# Patient Record
Sex: MALE | Race: WHITE | Employment: OTHER | URBAN - METROPOLITAN AREA
[De-identification: names, ages, dates, MRNs, and addresses within clinical notes are randomized per-mention and may not be internally consistent; named-entity substitution may affect disease eponyms.]

---

## 2017-01-18 ENCOUNTER — HOSPITAL ENCOUNTER (OUTPATIENT)
Dept: MRI IMAGING | Age: 67
Discharge: HOME | End: 2017-01-18
Attending: ORTHOPAEDIC SURGERY
Payer: COMMERCIAL

## 2017-01-18 DIAGNOSIS — M87.051 AVASCULAR NECROSIS OF BONE OF RIGHT HIP (HCC): ICD-10-CM

## 2017-01-18 PROCEDURE — 73721 MRI JNT OF LWR EXTRE W/O DYE: CPT

## 2017-01-24 ENCOUNTER — HOSPITAL ENCOUNTER (OUTPATIENT)
Dept: CT IMAGING | Age: 67
Discharge: HOME | End: 2017-01-24
Attending: ORTHOPAEDIC SURGERY
Payer: COMMERCIAL

## 2017-01-24 ENCOUNTER — HOSPITAL ENCOUNTER (OUTPATIENT)
Dept: INTERVENTIONAL RADIOLOGY/VASCULAR | Age: 67
Discharge: HOME | End: 2017-01-24
Attending: ORTHOPAEDIC SURGERY
Payer: COMMERCIAL

## 2017-01-24 VITALS
RESPIRATION RATE: 20 BRPM | TEMPERATURE: 98.1 F | WEIGHT: 180 LBS | DIASTOLIC BLOOD PRESSURE: 83 MMHG | BODY MASS INDEX: 28.93 KG/M2 | HEART RATE: 89 BPM | HEIGHT: 66 IN | OXYGEN SATURATION: 98 % | SYSTOLIC BLOOD PRESSURE: 148 MMHG

## 2017-01-24 DIAGNOSIS — M54.31 RIGHT SCIATIC NERVE PAIN: ICD-10-CM

## 2017-01-24 PROCEDURE — 74011636320 HC RX REV CODE- 636/320: Performed by: RADIOLOGY

## 2017-01-24 PROCEDURE — 72132 CT LUMBAR SPINE W/DYE: CPT

## 2017-01-24 PROCEDURE — 77030003666 HC NDL SPINAL BD -A

## 2017-01-24 PROCEDURE — 62304 MYELOGRAPHY LUMBAR INJECTION: CPT

## 2017-01-24 PROCEDURE — 77030014143 HC TY PUNC LUMBR BD -A

## 2017-01-24 PROCEDURE — 74011000250 HC RX REV CODE- 250: Performed by: RADIOLOGY

## 2017-01-24 RX ORDER — LIDOCAINE HYDROCHLORIDE 20 MG/ML
20-200 INJECTION, SOLUTION INFILTRATION; PERINEURAL ONCE
Status: COMPLETED | OUTPATIENT
Start: 2017-01-24 | End: 2017-01-24

## 2017-01-24 RX ADMIN — SODIUM BICARBONATE 2 ML: 0.2 INJECTION, SOLUTION INTRAVENOUS at 12:09

## 2017-01-24 RX ADMIN — LIDOCAINE HYDROCHLORIDE 60 MG: 20 INJECTION, SOLUTION INFILTRATION; PERINEURAL at 12:09

## 2017-01-24 RX ADMIN — IOPAMIDOL 20 ML: 408 INJECTION, SOLUTION INTRATHECAL at 12:09

## 2017-01-24 NOTE — IP AVS SNAPSHOT
Current Discharge Medication List  
  
ASK your doctor about these medications Dose & Instructions Dispensing Information Comments Morning Noon Evening Bedtime  
 aspirin delayed-release 81 mg tablet Your next dose is: Today, Tomorrow Other:  ____________ Dose:  81 mg Take 81 mg by mouth every morning. Refills:  0 CLARITIN 10 mg tablet Generic drug:  loratadine Your next dose is: Today, Tomorrow Other:  ____________ Dose:  10 mg Take 10 mg by mouth as needed for Allergies. Refills:  0  
     
   
   
   
  
 ibuprofen 400 mg tablet Commonly known as:  MOTRIN Your next dose is: Today, Tomorrow Other:  ____________ Refills:  0  
     
   
   
   
  
 lisinopril 10 mg tablet Commonly known as:  Ina Needy Your next dose is: Today, Tomorrow Other:  ____________ Dose:  10 mg Take 1 Tab by mouth daily. Quantity:  30 Tab Refills:  11  
     
   
   
   
  
 metoprolol succinate 25 mg XL tablet Commonly known as:  TOPROL-XL Your next dose is: Today, Tomorrow Other:  ____________ Dose:  25 mg Take 1 Tab by mouth daily. Quantity:  30 Tab Refills:  11  
     
   
   
   
  
 omeprazole 40 mg capsule Commonly known as:  PRILOSEC Your next dose is: Today, Tomorrow Other:  ____________ Dose:  40 mg Take 1 Cap by mouth every morning. Indications: GASTROESOPHAGEAL REFLUX Quantity:  30 Cap Refills:  11  
     
   
   
   
  
 rosuvastatin 20 mg tablet Commonly known as:  CRESTOR Your next dose is: Today, Tomorrow Other:  ____________ Dose:  20 mg Take 1 Tab by mouth every morning. Indications: HYPERCHOLESTEROLEMIA Quantity:  30 Tab Refills:  11  
     
   
   
   
  
 tamsulosin 0.4 mg capsule Commonly known as:  FLOMAX Your next dose is: Today, Tomorrow Other:  ____________ Dose:  0.4 mg Take 1 Cap by mouth nightly. Indications: BENIGN PROSTATIC HYPERPLASIA Quantity:  30 Cap Refills:  11  
     
   
   
   
  
 traMADol 50 mg tablet Commonly known as:  ULTRAM  
   
Your next dose is: Today, Tomorrow Other:  ____________ Dose:  50 mg Take 1 Tab by mouth every six (6) hours as needed for Pain. Max Daily Amount: 200 mg. Indications: PAIN Quantity:  45 Tab Refills:  0

## 2017-01-24 NOTE — PROGRESS NOTES
TRANSFER - IN REPORT:    Verbal report received from Gian RN(name) on Lance Harding  being received from IR(unit) for routine post - op      Report consisted of patients Situation, Background, Assessment and   Recommendations(SBAR). Information from the following report(s) Procedure Summary and MAR was reviewed with the receiving nurse. Opportunity for questions and clarification was provided. Assessment completed upon patients arrival to unit and care assumed.

## 2017-01-24 NOTE — ROUTINE PROCESS
TRANSFER - OUT REPORT:    Verbal report given to [facility] [address]  being transferred to IR recovery for routine post - op       Report consisted of patients Situation, Background, Assessment and   Recommendations(SBAR). Information from the following report(s) SBAR, Procedure Summary and MAR was reviewed with the receiving nurse. Opportunity for questions and clarification was provided.       Patient transported with:   Registered Nurse

## 2017-01-24 NOTE — PROGRESS NOTES
Interventional Radiology Prep Area Handoff      Allergies   Allergen Reactions    Codeine Rash     Tolerates Percocet without side- effects    Lipitor [Atorvastatin] Myalgia    Lortab [Hydrocodone-Acetaminophen] Rash and Itching       IRSummary reviewed. Pt identified with two identifiers. Verified procedure with Patient and IR Provider as Myelogram.    Consent obtained: yes H&P complete/updated: yes MD airway complete: n/a    Sedation:no   NPO: n/a   Anticoagulation: no     Pt shaved: yes   Antibiotics: n/a  Anesthesia notified: n/a    Additional Notes: n/a      Lines:  Peripherally Inserted Central Catheter:     Central Venous Catheter:     Venous Access Device:     Peripheral Intravenous Line:     Hemodialysis Catheter:     Drain(s):       Labs verified: yes  No results found for this or any previous visit (from the past 24 hour(s)). No data found.

## 2017-01-24 NOTE — PROCEDURES
Department of Interventional Radiology  [phone]        Interventional Radiology Brief Procedure Note    Patient: Chiara Manrique MRN: [id number]  SSN: [id number]    YOB: 1950  Age: 73 y.o. Sex: male      Date of Procedure: 1/24/2017    Pre-Procedure Diagnosis: back, RLE pain    Post-Procedure Diagnosis: SAME    Procedure(s): Myelogram    Brief Description of Procedure: fluoro guided LP, contrast injected, needle removed    Performed By: Michael Eduardo PA-C     Assistants: None    Anesthesia:None    Estimated Blood Loss: None    Specimens: None    Implants: None    Findings: See dictated report.      Complications: None    Recommendations: bedrest, CT     Follow Up: referring MD    Signed By: Michael Eduardo PA-C     January 24, 2017

## 2017-01-24 NOTE — H&P
Department of Interventional Radiology  [phone]    History and Physical    Patient:  Belen Lei MRN:  [id number]  SSN:  [id number]    YOB: 1950  Age:  73 y.o. Sex:  male      Primary Care Provider:  Bonnie Soto MD  Referring Physician:  Tsering Josue, *    Subjective:     Chief Complaint: back pain    History of the Present Illness: The patient is a 73 y.o. male who presents for lumbar myelogram. Low back pain radiates RLE        Past Medical History   Diagnosis Date    Abnormal cardiovascular function study 1/26/2016    Adenoma of left adrenal gland     BPH (benign prostatic hyperplasia)     Chest discomfort 1/26/2016    Chronic low back pain     COPD [facility]chronic obstructive pulmonary disease) ([facility] [address]     Coronary artery disease     ED (erectile dysfunction)     GERD (gastroesophageal reflux disease)     History of basal cell cancer     Hypertension     Mixed hyperlipidemia     Palpitations     Pleural effusion exudative 9/30/15     t-cent on left    Prostatic hypertrophy, benign 1/26/2016     Past Surgical History   Procedure Laterality Date    Hx colonoscopy  08/2011     tubular adenoma    Hx refractive surgery       bilateral     Hx heent Bilateral 8/2003     Lasik    Hx mohs procedure Left 2008    Pr total hip arthroplasty Left 3/2014    Hx lumbar fusion  2013     Laminectomy & fusion L4/L5    Pr cardiac surg procedure unlist  9/2015     CABG    Hx other surgical       thoracentesis        Review of Systems:    Pertinent items are noted in the History of Present Illness. Current Outpatient Prescriptions   Medication Sig    ibuprofen (MOTRIN) 400 mg tablet     metoprolol succinate (TOPROL-XL) 25 mg XL tablet Take 1 Tab by mouth daily.  omeprazole (PRILOSEC) 40 mg capsule Take 1 Cap by mouth every morning. Indications: GASTROESOPHAGEAL REFLUX    lisinopril (PRINIVIL, ZESTRIL) 10 mg tablet Take 1 Tab by mouth daily.     tamsulosin (FLOMAX) 0.4 mg capsule Take 1 Cap by mouth nightly. Indications: BENIGN PROSTATIC HYPERPLASIA    aspirin delayed-release 81 mg tablet Take 81 mg by mouth every morning.  traMADol (ULTRAM) 50 mg tablet Take 1 Tab by mouth every six (6) hours as needed for Pain. Max Daily Amount: 200 mg. Indications: PAIN    rosuvastatin (CRESTOR) 20 mg tablet Take 1 Tab by mouth every morning. Indications: HYPERCHOLESTEROLEMIA    loratadine (CLARITIN) 10 mg tablet Take 10 mg by mouth as needed for Allergies. No current facility-administered medications for this encounter. Allergies   Allergen Reactions    Codeine Rash     Tolerates Percocet without side- effects    Lipitor [Atorvastatin] Myalgia    Lortab [Hydrocodone-Acetaminophen] Rash and Itching     Patient states that he does not have allergy to this med and is currently taking as needed without issue. Family History   Problem Relation Age of Onset    Stroke Father     Hypertension Father     Heart Disease Father     Heart Disease Brother     Cancer Brother      lung    Heart Disease Mother     Heart Disease Brother     Heart Disease Sister     Lung Disease Brother     Cancer Sister      Pancrease     Social History   Substance Use Topics    Smoking status: Former Smoker     Packs/day: 2.00     Years: 30.00     Types: Cigarettes     Quit date: 2/1/1997    Smokeless tobacco: Never Used    Alcohol use 3.0 oz/week     6 Cans of beer per week        Objective:       Physical Examination:    Vitals:    01/24/17 1139   BP: 148/69   Pulse: 79   Temp: 98.1 °F (36.7 °C)   SpO2: 96%   Weight: 81.6 kg (180 lb)   Height: 5' 6\" (1.676 m)     Blood pressure 148/69, pulse 79, temperature 98.1 °F (36.7 °C), height 5' 6\" (1.676 m), weight 81.6 kg (180 lb), SpO2 96 %.   HEART: regular rate and rhythm  LUNG: clear to auscultation bilaterally  ABDOMEN: normal findings: soft, non-tender  EXTREMITIES: no deformities    Laboratory:     Lab Results   Component Value Date/Time Sodium 141 12/06/2016 07:47 AM    Sodium 140 06/28/2016 07:50 AM    Potassium 5.0 12/06/2016 07:47 AM    Potassium 4.6 06/28/2016 07:50 AM    Chloride 100 12/06/2016 07:47 AM    Chloride 103 06/28/2016 07:50 AM    CO2 23 12/06/2016 07:47 AM    CO2 20 06/28/2016 07:50 AM    Anion gap 8 09/18/2015 04:11 AM    Anion gap 8 09/17/2015 02:15 AM    Glucose 92 12/06/2016 07:47 AM    Glucose 101 06/28/2016 07:50 AM    BUN 12 12/06/2016 07:47 AM    BUN 17 06/28/2016 07:50 AM    Creatinine 0.96 12/06/2016 07:47 AM    Creatinine 0.95 06/28/2016 07:50 AM    GFR est AA 95 12/06/2016 07:47 AM    GFR est AA 97 06/28/2016 07:50 AM    GFR est non-AA 83 12/06/2016 07:47 AM    GFR est non-AA 84 06/28/2016 07:50 AM    Calcium 9.9 12/06/2016 07:47 AM    Calcium 9.7 06/28/2016 07:50 AM    Magnesium 2.7 09/18/2015 04:11 AM    Magnesium 2.6 09/17/2015 02:15 AM    Albumin 4.3 12/06/2016 07:47 AM    Albumin 4.3 06/28/2016 07:50 AM    Protein, total 7.2 12/06/2016 07:47 AM    Protein, total 7.0 06/28/2016 07:50 AM    Globulin 4.5 09/30/2015 07:22 AM    A-G Ratio 1.5 12/06/2016 07:47 AM    A-G Ratio 1.6 06/28/2016 07:50 AM    AST 26 12/06/2016 07:47 AM    AST 21 06/28/2016 07:50 AM    ALT 23 12/06/2016 07:47 AM    ALT 18 06/28/2016 07:50 AM     Lab Results   Component Value Date/Time    WBC 9.6 09/21/2015 09:40 AM    WBC 16.9 09/18/2015 04:11 AM    HGB 12.5 09/30/2015 08:40 AM    HGB 9.5 09/21/2015 09:40 AM    HCT 38.2 09/30/2015 08:40 AM    HCT 29.5 09/21/2015 09:40 AM    PLATELET 590 53/63/4655 09:40 AM    PLATELET 628 29/56/3895 04:11 AM     Lab Results   Component Value Date/Time    aPTT 29.6 09/16/2015 12:15 PM    aPTT 22.5 09/14/2015 08:00 AM    Prothrombin time 12.3 09/16/2015 12:15 PM    Prothrombin time 10.6 09/14/2015 08:00 AM    INR 1.1 09/16/2015 12:15 PM    INR 1.0 09/14/2015 08:00 AM       Assessment:     Low back pain    Plan:     Planned Procedure:  Lumbar myelogram    Risks, benefits, and alternatives reviewed with patient and he agrees to proceed with the procedure.       Signed By: Julia Mandujano PA-C     January 24, 2017

## 2017-01-24 NOTE — IP AVS SNAPSHOT
[facility] 
 
 
 [address] 
[phone] Patient: Oscar Castle MRN: [id number] CMV:[birth year] You are allergic to the following Allergen Reactions Codeine Rash Tolerates Percocet without side- effects Lipitor (Atorvastatin) Myalgia Lortab (Hydrocodone-Acetaminophen) Rash Itching Patient states that he does not have allergy to this med and is currently taking as needed without issue. Recent Documentation Height Weight BMI Smoking Status 1.676 m 81.6 kg 29.05 kg/m2 Former Smoker Emergency Contacts Name Discharge Info [facility]ation [facility] [3] Spouse [3] [phone] About your hospitalization You were admitted on:  January 24, 2017 You last received care in the:  [facility] Radiology Specials You were discharged on:  January 24, 2017 Unit phone number:  [phone] Why you were hospitalized Your primary diagnosis was:  Not on File Providers Seen During Your Hospitalizations Provider Role Specialty Primary office phone Castillo Allen MD Attending Provider Orthopedic Surgery [phone] Your Primary Care Physician (PCP) Primary Care Physician Office Phone Office Fax 70 [facility] [phone] Follow-up Information None Your Appointments Tuesday January 24, 2017  2:30 PM EST  
CT POST MYELO with [facility] CT [address] SLICE UNIT 1  
[facility] RADIOLOGY CT SCAN ([address] [place] [address]) [address]  
[phone] OUTSIDE FILMS - Any outside films related to the study being scheduled should be brought with you on the day of the exam if available.   MEDICATIONS - Patient must bring all medications currently taking to include prescriptions, over-the-counter meds, herbals, vitamins, and any dietary supplements. GENERAL INSTRUCTIONS - Must have someone to drive you home after the procedure, unless instructed otherwise by the Angio department. - For patients receiving Sedation: Patient must have nothing to eat or drink (NPO) after midnight. - If Lab work is required:  Encourage patient to have lab work completed PRIOR to arrival day of procedure.  Please report 30 minutes early to check in. Current Discharge Medication List  
  
ASK your doctor about these medications Dose & Instructions Dispensing Information Comments Morning Noon Evening Bedtime  
 aspirin delayed-release 81 mg tablet Your next dose is: Today, Tomorrow Other:  _________ Dose:  81 mg Take 81 mg by mouth every morning. Refills:  0 CLARITIN 10 mg tablet Generic drug:  loratadine Your next dose is: Today, Tomorrow Other:  _________ Dose:  10 mg Take 10 mg by mouth as needed for Allergies. Refills:  0  
     
   
   
   
  
 ibuprofen 400 mg tablet Commonly known as:  MOTRIN Your next dose is: Today, Tomorrow Other:  _________ Refills:  0  
     
   
   
   
  
 lisinopril 10 mg tablet Commonly known as:  Naseem Oscar Your next dose is: Today, Tomorrow Other:  _________ Dose:  10 mg Take 1 Tab by mouth daily. Quantity:  30 Tab Refills:  11  
     
   
   
   
  
 metoprolol succinate 25 mg XL tablet Commonly known as:  TOPROL-XL Your next dose is: Today, Tomorrow Other:  _________ Dose:  25 mg Take 1 Tab by mouth daily. Quantity:  30 Tab Refills:  11  
     
   
   
   
  
 omeprazole 40 mg capsule Commonly known as:  PRILOSEC Your next dose is: Today, Tomorrow Other:  _________ Dose:  40 mg Take 1 Cap by mouth every morning. Indications: GASTROESOPHAGEAL REFLUX Quantity:  30 Cap Refills:  11  
     
   
   
   
  
 rosuvastatin 20 mg tablet Commonly known as:  CRESTOR Your next dose is: Today, Tomorrow Other:  _________ Dose:  20 mg Take 1 Tab by mouth every morning. Indications: HYPERCHOLESTEROLEMIA Quantity:  30 Tab Refills:  11  
     
   
   
   
  
 tamsulosin 0.4 mg capsule Commonly known as:  FLOMAX Your next dose is: Today, Tomorrow Other:  _________ Dose:  0.4 mg Take 1 Cap by mouth nightly. Indications: BENIGN PROSTATIC HYPERPLASIA Quantity:  30 Cap Refills:  11  
     
   
   
   
  
 traMADol 50 mg tablet Commonly known as:  ULTRAM  
   
Your next dose is: Today, Tomorrow Other:  _________ Dose:  50 mg Take 1 Tab by mouth every six (6) hours as needed for Pain. Max Daily Amount: 200 mg. Indications: PAIN Quantity:  45 Tab Refills:  0 Discharge Instructions [address] Department of Interventional Radiology [facility] 
[phone] Office 
[phone] Fax POST LUMBAR PUNCTURE/MYELOGRAM/INTRATHECAL CHEMOTHERAPY DISCHARGE INSTRUCTIONS General Information: 
Lumbar Puncture: A LP is done to help diagnose several disorders, like pseudo tumor, migraines, meningitis, and multiple sclerosis. It involves a puncture (usually in the lower spine) into the sac that protects the spinal column. A sample of the fluid in that space is removed and tested in the lab. Myelogram: A Myelogram involves a lumbar puncture, and instead of removing fluid, contrast will be injected into the sac surrounding the spinal column. It is done to visualize the spinal column, nerve roots, spinal canal, vertebral discs and disc space. It is usually done to diagnose back pain with unknown cause or in preparation for surgery.  After the injection, a CT scan will be done, usually within two hours of the injection. Intrathecal Chemotherapy: 
 Chemotherapy can be given in many forms. Intrathecal chemo involves a lumbar puncture, and instead of removing fluid, the chemo will be injected into the space. After any of these procedures, you will be asked to lie flat on your back for 4-6 hours to prevent complications. You should also rest for 24 hours after you go home, and force fluids. If you have a headache, you should take Tylenol or acetaminophen. Call If: 
 You should call your Physician and/or the Radiology Nurse if you develop a headache that is not relieved by Tylenol, and worsens when you stand and eases when you lie down, you need to call. You may have developed what is referred to as a spinal headache. Our physicians will probably advise you to be on strict bed rest for 24 hours, to drink lots of fluids and caffeine. If this does not help the head pain, call again the next day. You should call if you have bleeding other than a small spot on your bandage. You should call if you have any numbness, tingling, weakness, fever, chills, urinary retention, severe itching, rash, welts, swelling, or confusion. Follow-up Instructions: See the doctor who ordered your procedure as he/she has instructed. If you had a Lumbar Puncture or Myelogram, your results should be available to your ordering doctor in 3-5 business days. You can remove your dressing in 24 hours and shower regularly. Do not bathe or swim for 72 hours. To Reach Us: If you have any questions about your procedure, please call the Interventional Radiology department at [phone]. After business hours (5pm) and weekends, call the answering service at [phone] and ask for the Radiologist on call to be paged. Interventional Radiology General Nurse Discharge After general anesthesia or intravenous sedation, for 24 hours or while taking prescription Narcotics: · Limit your activities · Do not drive and operate hazardous machinery · Do not make important personal or business decisions · Do  not drink alcoholic beverages · If you have not urinated within 8 hours after discharge, please contact your surgeon on call. * Please give a list of your current medications to your Primary Care Provider. * Please update this list whenever your medications are discontinued, doses are 
   changed, or new medications (including over-the-counter products) are added. * Please carry medication information at all times in case of emergency situations. These are general instructions for a healthy lifestyle: No smoking/ No tobacco products/ Avoid exposure to second hand smoke Surgeon General's Warning:  Quitting smoking now greatly reduces serious risk to your health. Obesity, smoking, and sedentary lifestyle greatly increases your risk for illness A healthy diet, regular physical exercise & weight monitoring are important for maintaining a healthy lifestyle You may be retaining fluid if you have a history of heart failure or if you experience any of the following symptoms:  Weight gain of 3 pounds or more overnight or 5 pounds in a week, increased swelling in our hands or feet or shortness of breath while lying flat in bed. Please call your doctor as soon as you notice any of these symptoms; do not wait until your next office visit. Recognize signs and symptoms of STROKE: 
F-face looks uneven A-arms unable to move or move unevenly S-speech slurred or non-existent T-time-call 911 as soon as signs and symptoms begin-DO NOT go Back to bed or wait to see if you get better-TIME IS BRAIN. Patient Signature: 
Date: 1/24/2017 Discharging Nurse: Hien Carvalho RN Discharge Orders None Introducing [facility]! Dear Vania Burch: Thank you for requesting a [employer] account.   Our records indicate that you already have an active [employer] account. You can access your account anytime at [email or web address] Did you know that you can access your hospital and ER discharge instructions at any time in [employer]? You can also review all of your test results from your hospital stay or ER visit. Additional Information If you have questions, please visit the Frequently Asked Questions section of the [employer] website at [email or web address] Remember, [employer] is NOT to be used for urgent needs. For medical emergencies, dial 911. Now available from your iPhone and Android! General Information Please provide this summary of care documentation to your next provider. Patient Signature:  ____________________________________________________________ Date:  ____________________________________________________________  
  
Eliza Boudreaux Provider Signature:  ____________________________________________________________ Date:  ____________________________________________________________

## 2017-01-24 NOTE — DISCHARGE INSTRUCTIONS
Anna Mariei 35 [address]  Department of Interventional Radiology  [facility]  [phone] Office  [phone] Fax  POST LUMBAR PUNCTURE/MYELOGRAM/INTRATHECAL CHEMOTHERAPY DISCHARGE INSTRUCTIONS  General Information:  Lumbar Puncture: A LP is done to help diagnose several disorders, like pseudo tumor, migraines, meningitis, and multiple sclerosis. It involves a puncture (usually in the lower spine) into the sac that protects the spinal column. A sample of the fluid in that space is removed and tested in the lab. Myelogram:   A Myelogram involves a lumbar puncture, and instead of removing fluid, contrast will be injected into the sac surrounding the spinal column. It is done to visualize the spinal column, nerve roots, spinal canal, vertebral discs and disc space. It is usually done to diagnose back pain with unknown cause or in preparation for surgery. After the injection, a CT scan will be done, usually within two hours of the injection. Intrathecal Chemotherapy:   Chemotherapy can be given in many forms. Intrathecal chemo involves a lumbar puncture, and instead of removing fluid, the chemo will be injected into the space. After any of these procedures, you will be asked to lie flat on your back for 4-6 hours to prevent complications. You should also rest for 24 hours after you go home, and force fluids. If you have a headache, you should take Tylenol or acetaminophen. Call If:   You should call your Physician and/or the Radiology Nurse if you develop a headache that is not relieved by Tylenol, and worsens when you stand and eases when you lie down, you need to call. You may have developed what is referred to as a spinal headache. Our physicians will probably advise you to be on strict bed rest for 24 hours, to drink lots of fluids and caffeine. If this does not help the head pain, call again the next day.  You should call if you have bleeding other than a small spot on your bandage. You should call if you have any numbness, tingling, weakness, fever, chills, urinary retention, severe itching, rash, welts, swelling, or confusion. Follow-up Instructions: See the doctor who ordered your procedure as he/she has instructed. If you had a Lumbar Puncture or Myelogram, your results should be available to your ordering doctor in 3-5 business days. You can remove your dressing in 24 hours and shower regularly. Do not bathe or swim for 72 hours. To Reach Us: If you have any questions about your procedure, please call the Interventional Radiology department at [phone]. After business hours (5pm) and weekends, call the answering service at [phone] and ask for the Radiologist on call to be paged. Interventional Radiology General Nurse Discharge    After general anesthesia or intravenous sedation, for 24 hours or while taking prescription Narcotics:  · Limit your activities  · Do not drive and operate hazardous machinery  · Do not make important personal or business decisions  · Do  not drink alcoholic beverages  · If you have not urinated within 8 hours after discharge, please contact your surgeon on call. * Please give a list of your current medications to your Primary Care Provider. * Please update this list whenever your medications are discontinued, doses are     changed, or new medications (including over-the-counter products) are added. * Please carry medication information at all times in case of emergency situations. These are general instructions for a healthy lifestyle:    No smoking/ No tobacco products/ Avoid exposure to second hand smoke  Surgeon General's Warning:  Quitting smoking now greatly reduces serious risk to your health.     Obesity, smoking, and sedentary lifestyle greatly increases your risk for illness  A healthy diet, regular physical exercise & weight monitoring are important for maintaining a healthy lifestyle    You may be retaining fluid if you have a history of heart failure or if you experience any of the following symptoms:  Weight gain of 3 pounds or more overnight or 5 pounds in a week, increased swelling in our hands or feet or shortness of breath while lying flat in bed. Please call your doctor as soon as you notice any of these symptoms; do not wait until your next office visit. Recognize signs and symptoms of STROKE:  F-face looks uneven    A-arms unable to move or move unevenly    S-speech slurred or non-existent    T-time-call 911 as soon as signs and symptoms begin-DO NOT go       Back to bed or wait to see if you get better-TIME IS BRAIN.       Patient Signature:  Date: 1/24/2017  Discharging Nurse: Philippe Crowley RN

## 2017-01-24 NOTE — PROGRESS NOTES
Recovery period without difficulty. Pt alert and oriented and denies pain. Dressing is clean, dry, and intact. Reviewed discharge instructions with patient and wife, both verbalized understanding. Pt escorted to lobby discharge area via wheelchair. Vital signs completed.

## 2017-06-05 ENCOUNTER — HOSPITAL ENCOUNTER (OUTPATIENT)
Dept: SURGERY | Age: 67
Discharge: HOME | End: 2017-06-05
Payer: COMMERCIAL

## 2017-06-05 VITALS
BODY MASS INDEX: 28.93 KG/M2 | RESPIRATION RATE: 18 BRPM | TEMPERATURE: 98.3 F | SYSTOLIC BLOOD PRESSURE: 159 MMHG | HEIGHT: 66 IN | DIASTOLIC BLOOD PRESSURE: 80 MMHG | OXYGEN SATURATION: 97 % | WEIGHT: 180 LBS | HEART RATE: 70 BPM

## 2017-06-05 LAB
ANION GAP BLD CALC-SCNC: 8 MMOL/L (ref 7–16)
APPEARANCE UR: CLEAR
BACTERIA SPEC CULT: NORMAL
BASOPHILS # BLD AUTO: 0 K/UL (ref 0–0.2)
BASOPHILS # BLD: 0 % (ref 0–2)
BILIRUB UR QL: NEGATIVE
BUN SERPL-MCNC: 13 MG/DL (ref 8–23)
CALCIUM SERPL-MCNC: 9.5 MG/DL (ref 8.3–10.4)
CHLORIDE SERPL-SCNC: 108 MMOL/L (ref 98–107)
CO2 SERPL-SCNC: 26 MMOL/L (ref 21–32)
COLOR UR: YELLOW
CREAT SERPL-MCNC: 1.1 MG/DL (ref 0.8–1.5)
DIFFERENTIAL METHOD BLD: ABNORMAL
EOSINOPHIL # BLD: 0.1 K/UL (ref 0–0.8)
EOSINOPHIL NFR BLD: 1 % (ref 0.5–7.8)
ERYTHROCYTE [DISTWIDTH] IN BLOOD BY AUTOMATED COUNT: 13.6 % (ref 11.9–14.6)
GLUCOSE SERPL-MCNC: 97 MG/DL (ref 65–100)
GLUCOSE UR STRIP.AUTO-MCNC: NEGATIVE MG/DL
HCT VFR BLD AUTO: 43.8 % (ref 41.1–50.3)
HGB BLD-MCNC: 14.5 G/DL (ref 13.6–17.2)
HGB UR QL STRIP: NEGATIVE
IMM GRANULOCYTES # BLD: 0 K/UL (ref 0–0.5)
IMM GRANULOCYTES NFR BLD AUTO: 0 % (ref 0–5)
KETONES UR QL STRIP.AUTO: NEGATIVE MG/DL
LEUKOCYTE ESTERASE UR QL STRIP.AUTO: NEGATIVE
LYMPHOCYTES # BLD AUTO: 21 % (ref 13–44)
LYMPHOCYTES # BLD: 1.7 K/UL (ref 0.5–4.6)
MCH RBC QN AUTO: 31.4 PG (ref 26.1–32.9)
MCHC RBC AUTO-ENTMCNC: 33.1 G/DL (ref 31.4–35)
MCV RBC AUTO: 94.8 FL (ref 79.6–97.8)
MONOCYTES # BLD: 0.9 K/UL (ref 0.1–1.3)
MONOCYTES NFR BLD AUTO: 11 % (ref 4–12)
NEUTS SEG # BLD: 5.6 K/UL (ref 1.7–8.2)
NEUTS SEG NFR BLD AUTO: 67 % (ref 43–78)
NITRITE UR QL STRIP.AUTO: NEGATIVE
PH UR STRIP: 5.5 [PH] (ref 5–9)
PLATELET # BLD AUTO: 276 K/UL (ref 150–450)
PLATELET COMMENTS,PCOM: ADEQUATE
PMV BLD AUTO: 10 FL (ref 10.8–14.1)
POTASSIUM SERPL-SCNC: 4.3 MMOL/L (ref 3.5–5.1)
PROT UR STRIP-MCNC: NEGATIVE MG/DL
RBC # BLD AUTO: 4.62 M/UL (ref 4.23–5.67)
RBC MORPH BLD: ABNORMAL
SERVICE CMNT-IMP: NORMAL
SODIUM SERPL-SCNC: 142 MMOL/L (ref 136–145)
SP GR UR REFRACTOMETRY: 1.02 (ref 1–1.02)
UROBILINOGEN UR QL STRIP.AUTO: 0.2 EU/DL (ref 0.2–1)
WBC # BLD AUTO: 8.3 K/UL (ref 4.3–11.1)
WBC MORPH BLD: ABNORMAL

## 2017-06-05 PROCEDURE — 85025 COMPLETE CBC W/AUTO DIFF WBC: CPT | Performed by: ORTHOPAEDIC SURGERY

## 2017-06-05 PROCEDURE — 80048 BASIC METABOLIC PNL TOTAL CA: CPT | Performed by: ORTHOPAEDIC SURGERY

## 2017-06-05 PROCEDURE — 81003 URINALYSIS AUTO W/O SCOPE: CPT | Performed by: ORTHOPAEDIC SURGERY

## 2017-06-05 PROCEDURE — 77030027138 HC INCENT SPIROMETER -A

## 2017-06-05 PROCEDURE — 87641 MR-STAPH DNA AMP PROBE: CPT | Performed by: ORTHOPAEDIC SURGERY

## 2017-06-05 NOTE — PERIOP NOTES
Patient verified name, , and surgery as listed in [facility]. TYPE  CASE:III  Orders per surgeonreceived  Labs per surgeon: T&S, CBC, BMP; UA  Labs per anesthesia protocol:  T&S for day of surgery  EKG  : In chart- NSR- 16  MRSA/MSSA:done  Pt instructed that they will be notified of positive results and will use mupirocin ointment as directed. Patient provided with handouts including guide to surgery , transfusions, pain management and hand hygiene for the family and community. Pt verbalizes understanding of all pre-op instructions . Instructed that family must be present in building at all times. Hibiclens and instructions given per hospital policy. Instructed patient to continue  previous medications as prescribed prior to surgery and  to take the following medications the day of surgery according to anesthesia guidelines : ASA 81mg, prilosec, crestor, metoprolol       Continue all previous medications unless otherwise directed. Original medication prescription bottles not visualized during patient appointment. Instructed patient to hold  the following medications prior to surgery: ibuprofen      Patient verbalized understanding of all instructions and provided all medical/health information to the best of their ability. Road to Recovery Spine surgery patient guide given. Instructed on incentive spirometry with return demonstration. Long handled prehab sponge given with instructions for use. Patient viewed spine prehab video.

## 2017-06-12 ENCOUNTER — ANESTHESIA EVENT (OUTPATIENT)
Dept: SURGERY | Age: 67
DRG: 460 | End: 2017-06-12
Payer: MEDICARE

## 2017-06-13 ENCOUNTER — HOSPITAL ENCOUNTER (INPATIENT)
Age: 67
LOS: 2 days | Discharge: HOME | DRG: 460 | End: 2017-06-15
Attending: ORTHOPAEDIC SURGERY | Admitting: ORTHOPAEDIC SURGERY
Payer: MEDICARE

## 2017-06-13 ENCOUNTER — ANESTHESIA (OUTPATIENT)
Dept: SURGERY | Age: 67
DRG: 460 | End: 2017-06-13
Payer: MEDICARE

## 2017-06-13 ENCOUNTER — APPOINTMENT (OUTPATIENT)
Dept: GENERAL RADIOLOGY | Age: 67
DRG: 460 | End: 2017-06-13
Attending: ORTHOPAEDIC SURGERY
Payer: MEDICARE

## 2017-06-13 LAB
ABO + RH BLD: NORMAL
BLOOD GROUP ANTIBODIES SERPL: NORMAL
SPECIMEN EXP DATE BLD: NORMAL

## 2017-06-13 PROCEDURE — 86900 BLOOD TYPING SEROLOGIC ABO: CPT | Performed by: ANESTHESIOLOGY

## 2017-06-13 PROCEDURE — 0SG30AJ FUSION OF LUMBOSACRAL JOINT WITH INTERBODY FUSION DEVICE, POSTERIOR APPROACH, ANTERIOR COLUMN, OPEN APPROACH: ICD-10-PCS | Performed by: ORTHOPAEDIC SURGERY

## 2017-06-13 PROCEDURE — C1713 ANCHOR/SCREW BN/BN,TIS/BN: HCPCS | Performed by: ORTHOPAEDIC SURGERY

## 2017-06-13 PROCEDURE — 74011250637 HC RX REV CODE- 250/637: Performed by: ORTHOPAEDIC SURGERY

## 2017-06-13 PROCEDURE — 77030034850: Performed by: ORTHOPAEDIC SURGERY

## 2017-06-13 PROCEDURE — 0ST40ZZ RESECTION OF LUMBOSACRAL DISC, OPEN APPROACH: ICD-10-PCS | Performed by: ORTHOPAEDIC SURGERY

## 2017-06-13 PROCEDURE — 77030019940 HC BLNKT HYPOTHRM STRY -B: Performed by: ANESTHESIOLOGY

## 2017-06-13 PROCEDURE — 74011250636 HC RX REV CODE- 250/636: Performed by: ORTHOPAEDIC SURGERY

## 2017-06-13 PROCEDURE — 74011250636 HC RX REV CODE- 250/636: Performed by: ANESTHESIOLOGY

## 2017-06-13 PROCEDURE — 72100 X-RAY EXAM L-S SPINE 2/3 VWS: CPT

## 2017-06-13 PROCEDURE — 77030037710: Performed by: ORTHOPAEDIC SURGERY

## 2017-06-13 PROCEDURE — 0SG00AJ FUSION OF LUMBAR VERTEBRAL JOINT WITH INTERBODY FUSION DEVICE, POSTERIOR APPROACH, ANTERIOR COLUMN, OPEN APPROACH: ICD-10-PCS | Performed by: ORTHOPAEDIC SURGERY

## 2017-06-13 PROCEDURE — 74011250636 HC RX REV CODE- 250/636

## 2017-06-13 PROCEDURE — 77030030163 HC BN WAX J&J -A: Performed by: ORTHOPAEDIC SURGERY

## 2017-06-13 PROCEDURE — 77030032490 HC SLV COMPR SCD KNE COVD -B: Performed by: ORTHOPAEDIC SURGERY

## 2017-06-13 PROCEDURE — 76210000017 HC OR PH I REC 1.5 TO 2 HR: Performed by: ORTHOPAEDIC SURGERY

## 2017-06-13 PROCEDURE — 77030008477 HC STYL SATN SLP COVD -A: Performed by: ANESTHESIOLOGY

## 2017-06-13 PROCEDURE — 74000 XR ABD (KUB): CPT

## 2017-06-13 PROCEDURE — 079T3ZZ DRAINAGE OF BONE MARROW, PERCUTANEOUS APPROACH: ICD-10-PCS | Performed by: ORTHOPAEDIC SURGERY

## 2017-06-13 PROCEDURE — 77030027138 HC INCENT SPIROMETER -A

## 2017-06-13 PROCEDURE — 77030000001 HC SPCR SPN PEEK STRY -I2: Performed by: ORTHOPAEDIC SURGERY

## 2017-06-13 PROCEDURE — 77030020782 HC GWN BAIR PAWS FLX 3M -B: Performed by: ANESTHESIOLOGY

## 2017-06-13 PROCEDURE — 77030018836 HC SOL IRR NACL ICUM -A: Performed by: ORTHOPAEDIC SURGERY

## 2017-06-13 PROCEDURE — 77030014007 HC SPNG HEMSTAT J&J -B: Performed by: ORTHOPAEDIC SURGERY

## 2017-06-13 PROCEDURE — 0SG00Z1 FUSION OF LUM JT, POST APPR P COL, OPEN APPROACH: ICD-10-PCS | Performed by: ORTHOPAEDIC SURGERY

## 2017-06-13 PROCEDURE — 01NB0ZZ RELEASE LUMBAR NERVE, OPEN APPROACH: ICD-10-PCS | Performed by: ORTHOPAEDIC SURGERY

## 2017-06-13 PROCEDURE — 77030012894: Performed by: ORTHOPAEDIC SURGERY

## 2017-06-13 PROCEDURE — 74011000250 HC RX REV CODE- 250

## 2017-06-13 PROCEDURE — 77030020407 HC IV BLD WRMR ST 3M -A: Performed by: ANESTHESIOLOGY

## 2017-06-13 PROCEDURE — 0ST20ZZ RESECTION OF LUMBAR VERTEBRAL DISC, OPEN APPROACH: ICD-10-PCS | Performed by: ORTHOPAEDIC SURGERY

## 2017-06-13 PROCEDURE — 76010000173 HC OR TIME 3 TO 3.5 HR INTENSV-TIER 1: Performed by: ORTHOPAEDIC SURGERY

## 2017-06-13 PROCEDURE — 00NY0ZZ RELEASE LUMBAR SPINAL CORD, OPEN APPROACH: ICD-10-PCS | Performed by: ORTHOPAEDIC SURGERY

## 2017-06-13 PROCEDURE — 0SG30Z1 FUSION OF LUMBOSACRAL JOINT, POST APPR P COL, OPEN APPROACH: ICD-10-PCS | Performed by: ORTHOPAEDIC SURGERY

## 2017-06-13 PROCEDURE — 74011000250 HC RX REV CODE- 250: Performed by: ANESTHESIOLOGY

## 2017-06-13 PROCEDURE — 77030014647 HC SEAL FBRN TISSL BAXT -D: Performed by: ORTHOPAEDIC SURGERY

## 2017-06-13 PROCEDURE — 77030019908 HC STETH ESOPH SIMS -A: Performed by: ANESTHESIOLOGY

## 2017-06-13 PROCEDURE — 77030031139 HC SUT VCRL2 J&J -A: Performed by: ORTHOPAEDIC SURGERY

## 2017-06-13 PROCEDURE — 77030008703 HC TU ET UNCUF COVD -A: Performed by: ANESTHESIOLOGY

## 2017-06-13 PROCEDURE — 65270000029 HC RM PRIVATE

## 2017-06-13 PROCEDURE — 77030011640 HC PAD GRND REM COVD -A: Performed by: ORTHOPAEDIC SURGERY

## 2017-06-13 PROCEDURE — 77030025623 HC BUR RND PRECIS STRY -D: Performed by: ORTHOPAEDIC SURGERY

## 2017-06-13 PROCEDURE — 76060000037 HC ANESTHESIA 3 TO 3.5 HR: Performed by: ORTHOPAEDIC SURGERY

## 2017-06-13 DEVICE — BLOCKER
Type: IMPLANTABLE DEVICE | Site: SPINE LUMBAR | Status: FUNCTIONAL
Brand: XIA 3

## 2017-06-13 DEVICE — GRAFT BNE SUB 20CC 2 4MM GRAN GROWTH FACT ALLGRFT OSTEOAMP: Type: IMPLANTABLE DEVICE | Site: SPINE LUMBAR | Status: FUNCTIONAL

## 2017-06-13 DEVICE — TI ALLOY RAD ROD
Type: IMPLANTABLE DEVICE | Site: SPINE LUMBAR | Status: FUNCTIONAL
Brand: XIA 3

## 2017-06-13 DEVICE — POLYAXIAL SCREW
Type: IMPLANTABLE DEVICE | Site: SPINE LUMBAR | Status: FUNCTIONAL
Brand: XIA 3

## 2017-06-13 DEVICE — VERTEBRAL SPACER
Type: IMPLANTABLE DEVICE | Site: SPINE LUMBAR | Status: FUNCTIONAL
Brand: AVS TL

## 2017-06-13 DEVICE — BIO DBM PLUS PUTTY (WITH CANCELLOUS)
Type: IMPLANTABLE DEVICE | Site: SPINE LUMBAR | Status: FUNCTIONAL
Brand: BIO DBM

## 2017-06-13 RX ORDER — SODIUM CHLORIDE 0.9 % (FLUSH) 0.9 %
5-10 SYRINGE (ML) INJECTION EVERY 8 HOURS
Status: DISCONTINUED | OUTPATIENT
Start: 2017-06-13 | End: 2017-06-13 | Stop reason: HOSPADM

## 2017-06-13 RX ORDER — ROCURONIUM BROMIDE 10 MG/ML
INJECTION, SOLUTION INTRAVENOUS AS NEEDED
Status: DISCONTINUED | OUTPATIENT
Start: 2017-06-13 | End: 2017-06-13 | Stop reason: HOSPADM

## 2017-06-13 RX ORDER — ALBUTEROL SULFATE 0.83 MG/ML
2.5 SOLUTION RESPIRATORY (INHALATION) AS NEEDED
Status: DISCONTINUED | OUTPATIENT
Start: 2017-06-13 | End: 2017-06-13 | Stop reason: HOSPADM

## 2017-06-13 RX ORDER — OXYCODONE HYDROCHLORIDE 5 MG/1
5 TABLET ORAL
Status: DISCONTINUED | OUTPATIENT
Start: 2017-06-13 | End: 2017-06-13 | Stop reason: HOSPADM

## 2017-06-13 RX ORDER — LIDOCAINE HYDROCHLORIDE 10 MG/ML
0.1 INJECTION INFILTRATION; PERINEURAL AS NEEDED
Status: DISCONTINUED | OUTPATIENT
Start: 2017-06-13 | End: 2017-06-13 | Stop reason: HOSPADM

## 2017-06-13 RX ORDER — ONDANSETRON 2 MG/ML
4 INJECTION INTRAMUSCULAR; INTRAVENOUS
Status: DISCONTINUED | OUTPATIENT
Start: 2017-06-13 | End: 2017-06-15 | Stop reason: HOSPADM

## 2017-06-13 RX ORDER — ZOLPIDEM TARTRATE 5 MG/1
5 TABLET ORAL
Status: DISCONTINUED | OUTPATIENT
Start: 2017-06-13 | End: 2017-06-15 | Stop reason: HOSPADM

## 2017-06-13 RX ORDER — ROSUVASTATIN CALCIUM 20 MG/1
20 TABLET, COATED ORAL
Status: DISCONTINUED | OUTPATIENT
Start: 2017-06-14 | End: 2017-06-15 | Stop reason: HOSPADM

## 2017-06-13 RX ORDER — NEOSTIGMINE METHYLSULFATE 1 MG/ML
INJECTION INTRAVENOUS AS NEEDED
Status: DISCONTINUED | OUTPATIENT
Start: 2017-06-13 | End: 2017-06-13 | Stop reason: HOSPADM

## 2017-06-13 RX ORDER — ACETAMINOPHEN 10 MG/ML
1000 INJECTION, SOLUTION INTRAVENOUS ONCE
Status: COMPLETED | OUTPATIENT
Start: 2017-06-13 | End: 2017-06-13

## 2017-06-13 RX ORDER — VANCOMYCIN HYDROCHLORIDE 1 G/20ML
INJECTION, POWDER, LYOPHILIZED, FOR SOLUTION INTRAVENOUS AS NEEDED
Status: DISCONTINUED | OUTPATIENT
Start: 2017-06-13 | End: 2017-06-13 | Stop reason: HOSPADM

## 2017-06-13 RX ORDER — HYDROMORPHONE HYDROCHLORIDE 2 MG/ML
INJECTION, SOLUTION INTRAMUSCULAR; INTRAVENOUS; SUBCUTANEOUS AS NEEDED
Status: DISCONTINUED | OUTPATIENT
Start: 2017-06-13 | End: 2017-06-13 | Stop reason: HOSPADM

## 2017-06-13 RX ORDER — NALOXONE HYDROCHLORIDE 0.4 MG/ML
0.4 INJECTION, SOLUTION INTRAMUSCULAR; INTRAVENOUS; SUBCUTANEOUS
Status: DISCONTINUED | OUTPATIENT
Start: 2017-06-13 | End: 2017-06-15 | Stop reason: HOSPADM

## 2017-06-13 RX ORDER — TAMSULOSIN HYDROCHLORIDE 0.4 MG/1
0.4 CAPSULE ORAL
Status: DISCONTINUED | OUTPATIENT
Start: 2017-06-13 | End: 2017-06-15 | Stop reason: HOSPADM

## 2017-06-13 RX ORDER — OXYCODONE HYDROCHLORIDE 5 MG/1
5 TABLET ORAL
Status: DISCONTINUED | OUTPATIENT
Start: 2017-06-13 | End: 2017-06-15 | Stop reason: HOSPADM

## 2017-06-13 RX ORDER — FENTANYL CITRATE 50 UG/ML
INJECTION, SOLUTION INTRAMUSCULAR; INTRAVENOUS AS NEEDED
Status: DISCONTINUED | OUTPATIENT
Start: 2017-06-13 | End: 2017-06-13 | Stop reason: HOSPADM

## 2017-06-13 RX ORDER — FAMOTIDINE 20 MG/1
20 TABLET, FILM COATED ORAL EVERY 12 HOURS
Status: DISCONTINUED | OUTPATIENT
Start: 2017-06-13 | End: 2017-06-15 | Stop reason: HOSPADM

## 2017-06-13 RX ORDER — HYDROMORPHONE HYDROCHLORIDE 2 MG/ML
0.5 INJECTION, SOLUTION INTRAMUSCULAR; INTRAVENOUS; SUBCUTANEOUS
Status: DISCONTINUED | OUTPATIENT
Start: 2017-06-13 | End: 2017-06-13 | Stop reason: HOSPADM

## 2017-06-13 RX ORDER — METOPROLOL SUCCINATE 25 MG/1
25 TABLET, EXTENDED RELEASE ORAL
Status: DISCONTINUED | OUTPATIENT
Start: 2017-06-14 | End: 2017-06-15 | Stop reason: HOSPADM

## 2017-06-13 RX ORDER — GLYCOPYRROLATE 0.2 MG/ML
INJECTION INTRAMUSCULAR; INTRAVENOUS AS NEEDED
Status: DISCONTINUED | OUTPATIENT
Start: 2017-06-13 | End: 2017-06-13 | Stop reason: HOSPADM

## 2017-06-13 RX ORDER — LIDOCAINE HYDROCHLORIDE 20 MG/ML
INJECTION, SOLUTION EPIDURAL; INFILTRATION; INTRACAUDAL; PERINEURAL AS NEEDED
Status: DISCONTINUED | OUTPATIENT
Start: 2017-06-13 | End: 2017-06-13 | Stop reason: HOSPADM

## 2017-06-13 RX ORDER — PROPOFOL 10 MG/ML
INJECTION, EMULSION INTRAVENOUS AS NEEDED
Status: DISCONTINUED | OUTPATIENT
Start: 2017-06-13 | End: 2017-06-13 | Stop reason: HOSPADM

## 2017-06-13 RX ORDER — CEFAZOLIN SODIUM IN 0.9 % NACL 2 G/50 ML
2 INTRAVENOUS SOLUTION, PIGGYBACK (ML) INTRAVENOUS EVERY 8 HOURS
Status: COMPLETED | OUTPATIENT
Start: 2017-06-13 | End: 2017-06-14

## 2017-06-13 RX ORDER — SODIUM CHLORIDE 0.9 % (FLUSH) 0.9 %
5-10 SYRINGE (ML) INJECTION AS NEEDED
Status: DISCONTINUED | OUTPATIENT
Start: 2017-06-13 | End: 2017-06-13 | Stop reason: HOSPADM

## 2017-06-13 RX ORDER — SODIUM CHLORIDE, SODIUM LACTATE, POTASSIUM CHLORIDE, CALCIUM CHLORIDE 600; 310; 30; 20 MG/100ML; MG/100ML; MG/100ML; MG/100ML
75 INJECTION, SOLUTION INTRAVENOUS CONTINUOUS
Status: DISCONTINUED | OUTPATIENT
Start: 2017-06-13 | End: 2017-06-13 | Stop reason: HOSPADM

## 2017-06-13 RX ORDER — DIPHENHYDRAMINE HCL 25 MG
25 CAPSULE ORAL
Status: DISCONTINUED | OUTPATIENT
Start: 2017-06-13 | End: 2017-06-15 | Stop reason: HOSPADM

## 2017-06-13 RX ORDER — SODIUM CHLORIDE, SODIUM LACTATE, POTASSIUM CHLORIDE, CALCIUM CHLORIDE 600; 310; 30; 20 MG/100ML; MG/100ML; MG/100ML; MG/100ML
50 INJECTION, SOLUTION INTRAVENOUS CONTINUOUS
Status: DISCONTINUED | OUTPATIENT
Start: 2017-06-13 | End: 2017-06-13 | Stop reason: HOSPADM

## 2017-06-13 RX ORDER — CEFAZOLIN SODIUM IN 0.9 % NACL 2 G/50 ML
2 INTRAVENOUS SOLUTION, PIGGYBACK (ML) INTRAVENOUS ONCE
Status: COMPLETED | OUTPATIENT
Start: 2017-06-13 | End: 2017-06-13

## 2017-06-13 RX ORDER — DOCUSATE SODIUM 100 MG/1
100 CAPSULE, LIQUID FILLED ORAL 2 TIMES DAILY
Status: DISCONTINUED | OUTPATIENT
Start: 2017-06-13 | End: 2017-06-15 | Stop reason: HOSPADM

## 2017-06-13 RX ORDER — FENTANYL CITRATE 50 UG/ML
100 INJECTION, SOLUTION INTRAMUSCULAR; INTRAVENOUS ONCE
Status: DISCONTINUED | OUTPATIENT
Start: 2017-06-13 | End: 2017-06-13 | Stop reason: HOSPADM

## 2017-06-13 RX ORDER — MIDAZOLAM HYDROCHLORIDE 1 MG/ML
2 INJECTION, SOLUTION INTRAMUSCULAR; INTRAVENOUS
Status: DISCONTINUED | OUTPATIENT
Start: 2017-06-13 | End: 2017-06-13 | Stop reason: HOSPADM

## 2017-06-13 RX ORDER — SODIUM CHLORIDE 0.9 % (FLUSH) 0.9 %
5-10 SYRINGE (ML) INJECTION AS NEEDED
Status: DISCONTINUED | OUTPATIENT
Start: 2017-06-13 | End: 2017-06-15 | Stop reason: HOSPADM

## 2017-06-13 RX ORDER — SODIUM CHLORIDE, SODIUM LACTATE, POTASSIUM CHLORIDE, CALCIUM CHLORIDE 600; 310; 30; 20 MG/100ML; MG/100ML; MG/100ML; MG/100ML
75 INJECTION, SOLUTION INTRAVENOUS CONTINUOUS
Status: DISPENSED | OUTPATIENT
Start: 2017-06-13 | End: 2017-06-14

## 2017-06-13 RX ORDER — MORPHINE SULFATE 2 MG/ML
2 INJECTION, SOLUTION INTRAMUSCULAR; INTRAVENOUS
Status: DISCONTINUED | OUTPATIENT
Start: 2017-06-13 | End: 2017-06-15 | Stop reason: HOSPADM

## 2017-06-13 RX ORDER — CYCLOBENZAPRINE HCL 10 MG
10 TABLET ORAL
Status: DISCONTINUED | OUTPATIENT
Start: 2017-06-13 | End: 2017-06-15 | Stop reason: HOSPADM

## 2017-06-13 RX ORDER — SODIUM CHLORIDE 0.9 % (FLUSH) 0.9 %
5-10 SYRINGE (ML) INJECTION EVERY 8 HOURS
Status: DISCONTINUED | OUTPATIENT
Start: 2017-06-13 | End: 2017-06-15 | Stop reason: HOSPADM

## 2017-06-13 RX ORDER — ACETAMINOPHEN 325 MG/1
650 TABLET ORAL EVERY 6 HOURS
Status: DISCONTINUED | OUTPATIENT
Start: 2017-06-13 | End: 2017-06-15 | Stop reason: HOSPADM

## 2017-06-13 RX ORDER — CELECOXIB 200 MG/1
200 CAPSULE ORAL
Status: DISCONTINUED | OUTPATIENT
Start: 2017-06-13 | End: 2017-06-13

## 2017-06-13 RX ORDER — OXYCODONE HYDROCHLORIDE 5 MG/1
10 TABLET ORAL
Status: DISCONTINUED | OUTPATIENT
Start: 2017-06-13 | End: 2017-06-15 | Stop reason: HOSPADM

## 2017-06-13 RX ORDER — ONDANSETRON 2 MG/ML
INJECTION INTRAMUSCULAR; INTRAVENOUS AS NEEDED
Status: DISCONTINUED | OUTPATIENT
Start: 2017-06-13 | End: 2017-06-13 | Stop reason: HOSPADM

## 2017-06-13 RX ORDER — MIDAZOLAM HYDROCHLORIDE 1 MG/ML
2 INJECTION, SOLUTION INTRAMUSCULAR; INTRAVENOUS ONCE
Status: DISCONTINUED | OUTPATIENT
Start: 2017-06-13 | End: 2017-06-13 | Stop reason: HOSPADM

## 2017-06-13 RX ORDER — OXYCODONE HYDROCHLORIDE 5 MG/1
10 TABLET ORAL
Status: DISCONTINUED | OUTPATIENT
Start: 2017-06-13 | End: 2017-06-13 | Stop reason: SDUPTHER

## 2017-06-13 RX ORDER — LISINOPRIL 5 MG/1
5 TABLET ORAL DAILY
Status: DISCONTINUED | OUTPATIENT
Start: 2017-06-14 | End: 2017-06-15 | Stop reason: HOSPADM

## 2017-06-13 RX ADMIN — ACETAMINOPHEN 1000 MG: 10 INJECTION, SOLUTION INTRAVENOUS at 17:45

## 2017-06-13 RX ADMIN — SODIUM CHLORIDE, SODIUM LACTATE, POTASSIUM CHLORIDE, AND CALCIUM CHLORIDE 75 ML/HR: 600; 310; 30; 20 INJECTION, SOLUTION INTRAVENOUS at 12:35

## 2017-06-13 RX ADMIN — ROCURONIUM BROMIDE 10 MG: 10 INJECTION, SOLUTION INTRAVENOUS at 15:30

## 2017-06-13 RX ADMIN — HYDROMORPHONE HYDROCHLORIDE 0.5 MG: 2 INJECTION, SOLUTION INTRAMUSCULAR; INTRAVENOUS; SUBCUTANEOUS at 17:30

## 2017-06-13 RX ADMIN — CEFAZOLIN 2 G: 1 INJECTION, POWDER, FOR SOLUTION INTRAMUSCULAR; INTRAVENOUS; PARENTERAL at 21:19

## 2017-06-13 RX ADMIN — SODIUM CHLORIDE, SODIUM LACTATE, POTASSIUM CHLORIDE, AND CALCIUM CHLORIDE: 600; 310; 30; 20 INJECTION, SOLUTION INTRAVENOUS at 14:39

## 2017-06-13 RX ADMIN — OXYCODONE HYDROCHLORIDE 10 MG: 5 TABLET ORAL at 21:20

## 2017-06-13 RX ADMIN — GLYCOPYRROLATE 0.3 MG: 0.2 INJECTION INTRAMUSCULAR; INTRAVENOUS at 16:26

## 2017-06-13 RX ADMIN — FAMOTIDINE 20 MG: 20 TABLET, FILM COATED ORAL at 21:19

## 2017-06-13 RX ADMIN — Medication 10 ML: at 21:19

## 2017-06-13 RX ADMIN — HYDROMORPHONE HYDROCHLORIDE 0.5 MG: 2 INJECTION, SOLUTION INTRAMUSCULAR; INTRAVENOUS; SUBCUTANEOUS at 17:09

## 2017-06-13 RX ADMIN — FENTANYL CITRATE 50 MCG: 50 INJECTION, SOLUTION INTRAMUSCULAR; INTRAVENOUS at 14:40

## 2017-06-13 RX ADMIN — ROCURONIUM BROMIDE 10 MG: 10 INJECTION, SOLUTION INTRAVENOUS at 14:54

## 2017-06-13 RX ADMIN — HYDROMORPHONE HYDROCHLORIDE 0.5 MG: 2 INJECTION, SOLUTION INTRAMUSCULAR; INTRAVENOUS; SUBCUTANEOUS at 16:57

## 2017-06-13 RX ADMIN — FENTANYL CITRATE 100 MCG: 50 INJECTION, SOLUTION INTRAMUSCULAR; INTRAVENOUS at 14:19

## 2017-06-13 RX ADMIN — FENTANYL CITRATE 50 MCG: 50 INJECTION, SOLUTION INTRAMUSCULAR; INTRAVENOUS at 15:59

## 2017-06-13 RX ADMIN — HYDROMORPHONE HYDROCHLORIDE 0.5 MG: 2 INJECTION, SOLUTION INTRAMUSCULAR; INTRAVENOUS; SUBCUTANEOUS at 17:20

## 2017-06-13 RX ADMIN — SODIUM CHLORIDE, SODIUM LACTATE, POTASSIUM CHLORIDE, AND CALCIUM CHLORIDE 75 ML/HR: 600; 310; 30; 20 INJECTION, SOLUTION INTRAVENOUS at 19:06

## 2017-06-13 RX ADMIN — LIDOCAINE HYDROCHLORIDE 80 MG: 20 INJECTION, SOLUTION EPIDURAL; INFILTRATION; INTRACAUDAL; PERINEURAL at 13:53

## 2017-06-13 RX ADMIN — Medication 2 MG: at 23:31

## 2017-06-13 RX ADMIN — SODIUM CHLORIDE, SODIUM LACTATE, POTASSIUM CHLORIDE, AND CALCIUM CHLORIDE: 600; 310; 30; 20 INJECTION, SOLUTION INTRAVENOUS at 15:30

## 2017-06-13 RX ADMIN — HYDROMORPHONE HYDROCHLORIDE 0.5 MG: 2 INJECTION, SOLUTION INTRAMUSCULAR; INTRAVENOUS; SUBCUTANEOUS at 16:50

## 2017-06-13 RX ADMIN — TAMSULOSIN HYDROCHLORIDE 0.4 MG: 0.4 CAPSULE ORAL at 21:19

## 2017-06-13 RX ADMIN — GLYCOPYRROLATE 0.2 MG: 0.2 INJECTION INTRAMUSCULAR; INTRAVENOUS at 14:00

## 2017-06-13 RX ADMIN — ONDANSETRON 4 MG: 2 INJECTION INTRAMUSCULAR; INTRAVENOUS at 16:22

## 2017-06-13 RX ADMIN — HYDROMORPHONE HYDROCHLORIDE 0.5 MG: 2 INJECTION, SOLUTION INTRAMUSCULAR; INTRAVENOUS; SUBCUTANEOUS at 16:40

## 2017-06-13 RX ADMIN — HYDROMORPHONE HYDROCHLORIDE 0.5 MG: 2 INJECTION, SOLUTION INTRAMUSCULAR; INTRAVENOUS; SUBCUTANEOUS at 16:27

## 2017-06-13 RX ADMIN — Medication 2 MG: at 19:06

## 2017-06-13 RX ADMIN — CEFAZOLIN 2 G: 1 INJECTION, POWDER, FOR SOLUTION INTRAMUSCULAR; INTRAVENOUS; PARENTERAL at 13:50

## 2017-06-13 RX ADMIN — ROCURONIUM BROMIDE 50 MG: 10 INJECTION, SOLUTION INTRAVENOUS at 13:53

## 2017-06-13 RX ADMIN — PROPOFOL 170 MG: 10 INJECTION, EMULSION INTRAVENOUS at 13:53

## 2017-06-13 RX ADMIN — PROMETHAZINE HYDROCHLORIDE 6.25 MG: 25 INJECTION INTRAMUSCULAR; INTRAVENOUS at 17:15

## 2017-06-13 RX ADMIN — ACETAMINOPHEN 650 MG: 325 TABLET ORAL at 23:33

## 2017-06-13 RX ADMIN — NEOSTIGMINE METHYLSULFATE 4 MG: 1 INJECTION INTRAVENOUS at 16:26

## 2017-06-13 RX ADMIN — FENTANYL CITRATE 100 MCG: 50 INJECTION, SOLUTION INTRAMUSCULAR; INTRAVENOUS at 13:53

## 2017-06-13 NOTE — H&P
Chief complaint: Back and leg pain. History of present illness: The patient returns today with persistent symptoms of lumbar claudication affecting the right side with functional deficit as previously described. The symptoms are predominately in the right buttock, lateral and posterior thigh, and lateral and posterior shin. The symptoms have been present for 9 months. The symptoms are described as a deep ache, spasm, and numbness. The symptoms are worse with activities on his feet including walking, standing. Any activities requiring walking or standing are quite limited. There has been no lasting benefit from conservative efforts. The best relief he had was 3 weeks of benefit (70%) after a recent epidural steroid injection. At this point he is ready to proceed with surgical intervention. ?????          PMHx/PSHx/Social History/Medications/Allergies/ROS are documented separately and have been reviewed. ROS: Denies fever, chills, chest pain. ????? Medications: Aspirin;Lisinopril (10 MG);Metoprolol Succinate ER (25 MG); Omeprazole (40 MG); PredniSONE (5 MG (48), Take per package instructions); Rosuvastatin Calcium; Tamsulosin HCl (0.4 MG); Xanax (0.25 MG, Take 1-3 by mouth 45 minutes prior to scan; May repeat x 1)  ? ???? Allergies: Codeine  ?????    Physical Exam: This is a well developed well nourished adult male in no acute distress. Mood and affect are appropriate. Oriented to person, place, and time. Head is normocephalic and atraumatic. Extraocular movements intact. Sclera anicteric. Respirations are unlabored and there is no evidence of cyanosis. Chest is clear to auscultation. Heart is regular rate and rhythm. Abdomen is soft. Straight leg testing is positive on the right. There is subjective light touch sensory loss over the right posterior lateral thigh, shin, and ankle.     Reflexes   Right Left   Quadriceps (L4) 2 2   Achilles (S1) 2 2     Ankle jerk is negative for clonus    Strength testing in the lower extremity reveals the following based on the 5 point grading scale:     HF (L2) H Ab (L5) KE (L3/4) ADF (L4) EHL (L5) A Ev (S1) APF (S1)   Right 5 5 5 5 4 4 5   Left 5 5 5 5 5 5 5     The feet are warm with good capillary refill and palpable pedal pulses. Radiographic Studies:    X-rays and MRI/CT were again independently reviewed and correlate with the patients symptoms. The MRI/CT shows L5-S1 and L3-L4 stenosis adjacent to the previous L4-L5 laminectomy and instrumented fusion. Assessment/Plan: This patients clinical history and physical exam is consistent with right sided lumbar claudication. The imaging studies are concordant with the patients symptoms. Conservative efforts have been reasonably exhausted and the patient feels like he cannot go on with the symptoms as they are. We have previously discussed surgical options and now he would like to proceed with surgical scheduling.  ????? We discussed the details of surgery including a midline incision in over the low back followed by dissection to the area of stenosis. The nerves would be freed up by trimming any impinging structures including ligaments and bone. Then the segments that are deemed to be unstable will be fused together with either bone graft or bone aspirate/synthetic, and typically screws and rods will supplement the fusion. A drain would be inserted and the wound would be closed with suture and covered with sterile dressings. The patient would expect to stay in the hospital 2-4 days or until he can get about safely with minimal assistance. A short stay in a rehabilitation facility could also be considered depending on how quickly he recovers. Follow-up would be scheduled for 2-3 weeks and he would have restrictions including no driving, and no lifting greater than 15 lbs until follow up with me.  He was encouraged to walk as much as possible before and after the operation to facilitate an expeditious recovery. We also discussed the potential risks of the surgery including, but not limited to infection, spinal fluid leak and potential headaches requiring him to remain supine or have a lumbar drain inserted post-operatively; injury to the cauda equina or peripheral nerve root resulting in paralysis, bowel or bladder dysfunction, or loss of use of an extremity; persistent back or leg symptoms or pain at the bone aspirate/graft site; recurrence of stenosis or the development of instability, or failure of the hardware or fusion to heal possibly needing additional surgery;  blood loss requiring transfusion; and the risks of anesthesia including, but not limited heart attack, stroke, and blood clot. The patient voiced an understanding of these issues and would like to discuss them over with her family and will get back with me with his desired treatment course. The procedure that may prove to be beneficial here is a revision L5-S1 laminectomy and fusion with bone marrow aspirate/synthetic and instrumentation,  TLIF, possible inclusion of L3-4.           Electronically Signed By Li Kuhn MD

## 2017-06-13 NOTE — PROGRESS NOTES
TRANSFER - IN REPORT:    Verbal report received from Jimenez RN(name) on Valentina Gomes  being received from PACU (unit) for routine progression of care      Report consisted of patients Situation, Background, Assessment and   Recommendations(SBAR). Information from the following report(s) Kardex was reviewed with the receiving nurse. Opportunity for questions and clarification was provided. Assessment completed upon patients arrival to unit and care assumed.

## 2017-06-13 NOTE — PROGRESS NOTES
Skin assessment with Lucila Castillo. Surgical dressing CDI with Hemovac intact and charged. Hemovac has serosanguineous drainage. TEDs to BLE. Skin otherwise intact.

## 2017-06-13 NOTE — IP AVS SNAPSHOT
Current Discharge Medication List  
  
START taking these medications Dose & Instructions Dispensing Information Comments Morning Noon Evening Bedtime HYDROcodone-acetaminophen 7.5-325 mg per tablet Commonly known as:  Durrell Brittle Your last dose was: Your next dose is:    
   
   
 Dose:  1 Tab Take 1 Tab by mouth every four (4) hours as needed for Pain. Max Daily Amount: 6 Tabs. Quantity:  60 Tab Refills:  0 CONTINUE these medications which have CHANGED Dose & Instructions Dispensing Information Comments Morning Noon Evening Bedtime  
 ibuprofen 800 mg tablet Commonly known as:  MOTRIN What changed:  additional instructions Your last dose was: Your next dose is:    
   
   
 Dose:  800 mg Take 1 Tab by mouth every eight (8) hours as needed for Pain. Indications: OSTEOARTHRITIS Quantity:  90 Tab Refills:  4  
     
   
   
   
  
 lisinopril 10 mg tablet Commonly known as:  Mauricio Valdovinos What changed:   
- how much to take - when to take this Your last dose was: Your next dose is:    
   
   
 Dose:  10 mg Take 1 Tab by mouth daily. Quantity:  30 Tab Refills:  11  
     
   
   
   
  
 metoprolol succinate 25 mg XL tablet Commonly known as:  TOPROL-XL What changed:  when to take this Your last dose was: Your next dose is:    
   
   
 Dose:  25 mg Take 1 Tab by mouth daily. Quantity:  90 Tab Refills:  3 CONTINUE these medications which have NOT CHANGED Dose & Instructions Dispensing Information Comments Morning Noon Evening Bedtime  
 aspirin delayed-release 81 mg tablet Your last dose was: Your next dose is:    
   
   
 Dose:  81 mg Take 81 mg by mouth every morning. Refills:  0  
     
   
   
   
  
 omeprazole 40 mg capsule Commonly known as:  PRILOSEC Your last dose was: Your next dose is:    
   
   
 Dose:  40 mg Take 1 Cap by mouth every morning. Indications: GASTROESOPHAGEAL REFLUX Quantity:  30 Cap Refills:  11  
     
   
   
   
  
 rosuvastatin 20 mg tablet Commonly known as:  CRESTOR Your last dose was: Your next dose is:    
   
   
 Dose:  20 mg Take 1 Tab by mouth every morning. Indications: HYPERCHOLESTEROLEMIA Quantity:  30 Tab Refills:  11  
     
   
   
   
  
 tamsulosin 0.4 mg capsule Commonly known as:  FLOMAX Your last dose was: Your next dose is:    
   
   
 Dose:  0.4 mg Take 1 Cap by mouth nightly. Indications: BENIGN PROSTATIC HYPERPLASIA Quantity:  30 Cap Refills:  11 Where to Get Your Medications Information on where to get these meds will be given to you by the nurse or doctor. ! Ask your nurse or doctor about these medications HYDROcodone-acetaminophen 7.5-325 mg per tablet

## 2017-06-13 NOTE — ROUTINE PROCESS
TRANSFER - OUT REPORT:    Verbal report given to [facility] on Lola Fowler  being transferred to [facility] routine post - op       Report consisted of patients Situation, Background, Assessment and   Recommendations(SBAR). Information from the following report(s) SBAR, Kardex, OR Summary, Procedure Summary, Intake/Output and MAR was reviewed with the receiving nurse. Lines:   Peripheral IV 06/13/17 Left Wrist (Active)   Site Assessment Clean, dry, & intact 6/13/2017  5:00 PM   Phlebitis Assessment 0 6/13/2017  5:00 PM   Infiltration Assessment 0 6/13/2017  5:00 PM   Dressing Status Clean, dry, & intact 6/13/2017  5:00 PM   Dressing Type Transparent;Tape 6/13/2017  5:00 PM   Hub Color/Line Status Infusing 6/13/2017  5:00 PM   Action Taken Blood drawn 6/13/2017 12:31 PM   Alcohol Cap Used No 6/13/2017  5:00 PM        Opportunity for questions and clarification was provided. Patient transported with:   O2 @ 3 liters    VTE prophylaxis orders have been written for Lola Fowler. Patient and family given floor number and nurses name. Family updated re: pt status after security code verified.

## 2017-06-13 NOTE — IP AVS SNAPSHOT
Iza Flow 
 
 
 [address] 
[phone] Patient: Raulito Gage MRN: [id number] JYR:[id number] You are allergic to the following Allergen Reactions Codeine Rash Tolerates Percocet without side- effects Recent Documentation Height Weight BMI Smoking Status 1.676 m 80 kg 28.46 kg/m2 Former Smoker Emergency Contacts Name Discharge Info Relation Home Work [facility] DISCHARGE CAREGIVER [3] Spouse [3] [phone] About your hospitalization You were admitted on:  June 13, 2017 You last received care in the:  [facility] 7 MED SURG You were discharged on:  Reina 15, 2017 Unit phone number:  [phone] Why you were hospitalized Your primary diagnosis was:  Lumbar Stenosis With Neurogenic Claudication Your diagnoses also included:  Gerd (Gastroesophageal Reflux Disease), Copd (Chronic Obstructive Pulmonary Disease) ([facility]), Cad (Coronary Artery Disease), Benign Hypertrophy Of Prostate, Essential Hypertension, S/P Cabg (Coronary Artery Bypass Graft) Providers Seen During Your Hospitalizations Provider Role Specialty Primary office phone Ghanshyam Dickinson MD Attending Provider Orthopedic Surgery [phone] Your Primary Care Physician (PCP) Primary Care Physician Office Phone Office Fax [address] [facility] [phone] Follow-up Information Follow up With Details Comments Contact Info Johanne Patel MD   [facility] [address] [facility] [address] 
[phone] Ghanshyam Dickinson MD On 6/29/2017 0830 am at [facility]. [facility] [employer] [facility] [address] 
[phone] Your Appointments Wednesday June 21, 2017  9:10 AM EDT  
LAB with I[facility] LAB [facility] ([address] [facility]  
[address] [facility] [id number] [phone] Thursday June 29, 2017  9:00 AM EDT Office Visit with Dylan Hagen NP [facility] ([address] [facility]  
[address] [facility] [id number] [phone] Current Discharge Medication List  
  
START taking these medications Dose & Instructions Dispensing Information Comments Morning Noon Evening Bedtime HYDROcodone-acetaminophen 7.5-325 mg per tablet Commonly known as:  Kiara Cruise Your last dose was: Your next dose is:    
   
   
 Dose:  1 Tab Take 1 Tab by mouth every four (4) hours as needed for Pain. Max Daily Amount: 6 Tabs. Quantity:  60 Tab Refills:  0 CONTINUE these medications which have CHANGED Dose & Instructions Dispensing Information Comments Morning Noon Evening Bedtime  
 ibuprofen 800 mg tablet Commonly known as:  MOTRIN What changed:  additional instructions Your last dose was: Your next dose is:    
   
   
 Dose:  800 mg Take 1 Tab by mouth every eight (8) hours as needed for Pain. Indications: OSTEOARTHRITIS Quantity:  90 Tab Refills:  4  
     
   
   
   
  
 lisinopril 10 mg tablet Commonly known as:  Marie Mendes What changed:   
- how much to take - when to take this Your last dose was: Your next dose is:    
   
   
 Dose:  10 mg Take 1 Tab by mouth daily. Quantity:  30 Tab Refills:  11  
     
   
   
   
  
 metoprolol succinate 25 mg XL tablet Commonly known as:  TOPROL-XL What changed:  when to take this Your last dose was: Your next dose is:    
   
   
 Dose:  25 mg Take 1 Tab by mouth daily. Quantity:  90 Tab Refills:  3 CONTINUE these medications which have NOT CHANGED Dose & Instructions Dispensing Information Comments Morning Noon Evening Bedtime  
 aspirin delayed-release 81 mg tablet Your last dose was: Your next dose is:    
   
   
 Dose:  81 mg Take 81 mg by mouth every morning. Refills:  0  
     
   
   
   
  
 omeprazole 40 mg capsule Commonly known as:  PRILOSEC Your last dose was: Your next dose is:    
   
   
 Dose:  40 mg Take 1 Cap by mouth every morning. Indications: GASTROESOPHAGEAL REFLUX Quantity:  30 Cap Refills:  11  
     
   
   
   
  
 rosuvastatin 20 mg tablet Commonly known as:  CRESTOR Your last dose was: Your next dose is:    
   
   
 Dose:  20 mg Take 1 Tab by mouth every morning. Indications: HYPERCHOLESTEROLEMIA Quantity:  30 Tab Refills:  11  
     
   
   
   
  
 tamsulosin 0.4 mg capsule Commonly known as:  FLOMAX Your last dose was: Your next dose is:    
   
   
 Dose:  0.4 mg Take 1 Cap by mouth nightly. Indications: BENIGN PROSTATIC HYPERPLASIA Quantity:  30 Cap Refills:  11 Where to Get Your Medications Information on where to get these meds will be given to you by the nurse or doctor. ! Ask your nurse or doctor about these medications HYDROcodone-acetaminophen 7.5-325 mg per tablet Discharge Instructions MAY shower NO tub bathing LEAVE dressing on incision for 3 DAYS-->then may remove (If dressing starts to fall off may re-secure with tape) NO lifting anything heavier than 5LBS (or heavier than a gallon of milk!) NO Bending, Lifting or Twisting NO driving until directed by your doctor Avoid sitting more than 20 - 30 minutes at a time DO NOT take any NSAIDS (either prescribed or over the counter until directed (Aleve, Ibuprofen, Mobic, etc) as this will interfere with bone healing CALL the doctor if:  Fever >100.5 ([phone])                 Incision becomes red, swollen or  opens up Incision has yellow, thick drainage or an odor Pain is not managed with prescribed medications Excessive nausea and/or vomiting Avoid having pets sleep in bed with you until incision is completely healed Discharge instructions, follow up appt, and prescriptions reviewed with pt. Opportunity for questions provided. Reinforced medications to continue and to stop. Discharge Orders None [facility] Announcement We are excited to announce that we are making your provider's discharge notes available to you in [employer]. You will see these notes when they are completed and signed by the physician that discharged you from your recent hospital stay. If you have any questions or concerns about any information you see in [employer], please call the Health Information Department where you were seen or reach out to your Primary Care Provider for more information about your plan of care. Introducing [facility] SERVICES! Dear Bernardino Gaspar: Thank you for requesting a [employer] account. Our records indicate that you already have an active [employer] account. You can access your account anytime at [email or web address] Did you know that you can access your hospital and ER discharge instructions at any time in [employer]? You can also review all of your test results from your hospital stay or ER visit. Additional Information If you have questions, please visit the Frequently Asked Questions section of the [employer] website at [email or web address] Remember, [employer] is NOT to be used for urgent needs. For medical emergencies, dial 911. Now available from your iPhone and Android! General Information Please provide this summary of care documentation to your next provider.  
  
  
    
    
 Patient Signature: ____________________________________________________________ Date:  ____________________________________________________________  
  
Christia Sicilian Provider Signature:  ____________________________________________________________ Date:  ____________________________________________________________

## 2017-06-13 NOTE — PERIOP NOTES
Radiologist called and asked to speak with Dr. Clarissa Torres.  Provided Dr. Jaya Wheeler phone number for Radiologist

## 2017-06-13 NOTE — OP NOTES
[address] [address]   [phone]    OPERATIVE REPORT    Patient ID:Jasiel Stewart  [id number]  1950  78 y.o. DATE OF SURGERY: 6/13/2017    SURGEON: Madeline Williamson MD    ASSISTANT: Woodrow Tierney MD    A skilled  was deemed necessary for this case due to the intimate proximity of the thecal sac and spinal nerve roots. He was there for the entire duration of the case. PREOP DIAGNOSIS: Lumbar lumbar stenosis adjacent to a prior lumbar laminectomy and fusion. POSTOP DIAGNOSIS: Lumbar lumbar stenosis adjacent to a prior lumbar laminectomy and fusion    PROCEDURE:  1. Lumbar laminectomy L3-L4 and L5-S1with bilateral foraminotomies. 2. Lumbar posterolateral fusion  L3-L4 and L5-S1.  3. Pedicle screw instrumentation  L3 through S1.  4. Bone marrow aspirate for allograft  5. Translumbar interbody fusion  L3-L4 and L5-S1.  6. Insertion biomechanical device  L3-L4 and L5-S1   7. Use of intraoperative microscope for visualization of the neural elements. ANESTHESIA: General    ESTIMATED BLOOD LOSS:  600 ml    INTRAOPERATIVE COMPLICATIONS: None. POSTOP CONDITION: Stable. IMPLANTS:   Implant Name Type Inv.  Item Serial No.  Lot No. LRB No. Used Action   GRAFT BNE GRAN DBM 2-4MM 20ML -- OSTEOAMP - [id number]  GRAFT BNE GRAN DBM 2-4MM 20ML -- OSTEOAMP [id number]--[id number] Aqua Access  N/A 1 Implanted   GRAFT DBM PTTY+ W/CHIP 10ML -- BIO DBM - [id number]  GRAFT DBM PTTY+ W/CHIP 10ML -- BIO DBM  [employer] HOW [id number] N/A 1 Implanted   GRAFT DBM PTTY+ W/CHIP 10ML -- BIO DBM - [id number]  GRAFT DBM PTTY+ W/CHIP 10ML -- BIO DBM  [employer] HOW [id number] N/A 1 Implanted   SPACER SPNE VERT AVS 4D 7X25 --  - [id number]  SPACER SPNE VERT AVS 4D 7X25 --   [employer] HOW [id number] N/A 1 Implanted   SPACER SPNE VERT AVS 4D 9X30 --  - [id number]  SPACER SPNE VERT AVS 4D 9X30 --   [employer]IM[employer] SPINE [facility] [id number] N/A 1 Implanted   BLOCKER SPNE ANDREW 3 TI --  - [id number]  BLOCKER SPNE ANDREW 3 TI --   [employer] HOWM [id number] N/A 8 Implanted   SCR SPNE ANDREW 3 7.5X50MM TI --  - [id number]  SCR SPNE ANDREW 3 7.5X50MM TI --   [employer] HOWM [id number] N/A 8 Implanted   RONNY SPMICHAEL MORALES 3 [id number] TI --  - [id number]   RONNY SPNE ANDREW 3 [id number] TI --    [employer] HOWM [id number] N/A 1 Implanted       INDICATIONS FOR PROCEDURE: Patient has had low back pain with radiation to the buttocks and lower extremities for an extended period of time. The symptoms and exam findings were felt to be consistent with neurogenic claudication. The preoperative radiographs and other imaging confirmed showed spondylolisthesis and stenosis. Conservative measures have been exhausted as outlined. The symptoms progressed to the point where there is difficulty performing any task that requires prolonged standing or walking which interfered with activities of daily living and ability to enjoy life. In the outpatient setting the risks, benefits and potential complications of the above-listed procedure were discussed with her and an informed consent was obtained. DESCRIPTION OF PROCEDURE: After adequate induction of general anesthesia, the patient was positioned prone on the [facility] spinal table. Care was taken to pad all bony prominences. The shoulders and elbows were placed in the 90/90 position. The abdomen was allowed to hang free to decrease intraabdominal and venous pressure. The lumbar area was prepped and draped in the usual sterile fashion. Preoperative antibiotics were administered. A time out was called to confirm appropriate patient, proposed procedure and proposed incision site. With this confirmation, an incision was created in the midline of the back over the lumbar spinous processes. . Dissection was carried down through the skin and subcutaneous tissues to the level of the lumbodorsal fascia.  A Kocher clamp was attached to the a spinous process and a cross-table lateral fluoroscopic image was obtained to confirm spinal level. With this confirmation, the spinous process was marked with a [place] rongeur and the lumbar dorsal fascia was released off of the spinous processes. The paraspinous musculature was elevated in a subperiosteal fashion in a lateral direction off of the lamina and over the the facet joints to be fused. The L3 through L5 transverse processes were exposed to their lateral tips. The sacral ala was exposed as well. All soft tissue was elevated off of the intertransverse membrane laterally in preparation for a fusion bed. The prior instrumentation at L4-5 was removed and the fusion inspected. It was felt to be solid. With the posterior lateral dissection completed, attention was directed centrally where a [place] rongeur was used to resect the spinous processes of L3 and S1. The 4 mm josee was then used to thin the lamina to an egg shell like thickness. The operative microscope was brought to the field and use for visualization of the neural elements during the decompression. A triple-0 angled curet was used to elevate the ligamentum flavum off of its origin on the caudal surface of the L5 lamina as well as off the cephalad surface of the S1 lamina. The ligamentum flavum was elevated from the thecal sac and a plane was created in the epidural space with a [place] elevator. A 4 mm Kerrison was used to perform a central laminectomy of S1 and this was carried cephalad through L5. The central laminectomy was widened to the medial border of the pedicle at each level. With the central laminectomy completed, a 4 mm Kerrison was used to undercut the lateral recess along the entire length of the laminectomy site. Then using the [facility] elevator to retract the thecal sac and identify each of the nerve roots, partial foraminotomies were performed and each nerve was visualized and decompressed bilaterally . A triple-0 angled curet was used to elevate the ligamentum flavum off of its origin on the caudal surface of the L3 lamina as well as off the cephalad surface of the L4 lamina. The ligamentum flavum was elevated from the thecal sac and a plane was created in the epidural space with a [place] elevator. A 4 mm Kerrison was used to perform a central laminectomy of L4 and this was carried cephalad through L3. The central laminectomy was widened to the medial border of the pedicle at each level. With the central laminectomy completed, a 4 mm Kerrison was used to undercut the lateral recess along the entire length of the laminectomy site. Then using the [facility] elevator to retract the thecal sac and identify each of the nerve roots, partial foraminotomies were performed and each nerve was visualized and decompressed bilaterally. With this, attention was directed toward the  iliac crest where a separate fascial incision was created over the posterior-superior iliac spine. The 8 gauge needle was impacted into the posterior superior iliac spine to a depth of about 2 cm. Bone marrow aspirate was obtained and spread over the allograft bone. The needle was removed and pressure applied over the posterior superior iliac spine. Attention was re-directed towards the lumbar wound. The arun nerve root retractor was used to retrace the thecal sac overlying the L3 - L4  disc space. Care was taken to protect the exiting and descending nerve roots at all times. An annulotomy was then performed with a 15-blade. A complete discectomy was performed using a series of curets and rongeurs. The interbody sizing system was brought to the field and the sizing paddles were used sequentially to an appropriate fit. The endplates were prepared for fusion using the rasps. A trial interbody device was sized and inserted. Fluoroscopic images were obtained of the trial in both planes. The final PEEK implant was the opened and packed with local autograft.   Additional local bone graft was placed anteriorly in the interbody space. The PEEK cage was then impacted and rotated appropriately. Again fluoroscopy was ws used to confirm cage positioning. The arun nerve root retractor was used to retrace the thecal sac overlying the L5 - S1  disc space. Care was taken to protect the exiting and descending nerve roots at all times. An annulotomy was then performed with a 15-blade. A complete discectomy was performed using a series of curets and rongeurs. The interbody sizing system was brought to the field and the sizing paddles were used sequentially to an appropriate fit. The endplates were prepared for fusion using the rasps. A trial interbody device was sized and inserted. Fluoroscopic images were obtained of the trial in both planes. The final PEEK implant was the opened and packed with local autograft. Additional local bone graft was placed anteriorly in the interbody space. The PEEK cage was then impacted and rotated appropriately. Again fluoroscopy was ws used to confirm cage positioning. The 4 mm josee was used to decorticate the previously exposed transverse processes and lateral aspect of the facet joints and pars intra-articularis. The sacral ala was decorticated as well. The [place] spinal instrumentation system was brought to the field and using a free hand intersection technique, pedicle screws were placed bilaterally from L3 to S1. The medial border of the pedicle was visualized through the spinal canal to confirm no medial or inferior breech. This was felt to be satisfactory. At this point the bone marrow soaked allograft was then packed into the lateral gutters beneath the screw heads, along the decorticated transverse processes and lateral facet joints for the arthrodesis. Appropriately sized rods were then selected and bent into additional lordosis and laid into the pedicle screw heads.  The set screws were then applied and tightened to the appropriate torque. C-arm fluoroscopy was brought in and used to obtain images to confirm appropriate hardware level and placement. This was felt to be satisfactory. With this, the wound was liberally irrigated and a hemovac drain was inserted through a separate incision in a subfascial plane. The lumbodorsal fascia was approximated with a # 1 Vicryl suture in an interrupted fashion. The subcutaneous tissue and skin were approximated in a layered fashion. Benzoin and Steri-Strips were applied. Sterile dressings were applied. The patient tolerated the procedure well and was returned to the postanesthesia care unit in stable condition. At the end of the case, all sponge, needle, and instrument counts were correct.       Amanda Guillen MD

## 2017-06-13 NOTE — ANESTHESIA PREPROCEDURE EVALUATION
Anesthetic History     PONV          Review of Systems / Medical History  Patient summary reviewed, nursing notes reviewed and pertinent labs reviewed    Pulmonary    COPD: mild            Comments: Alpha-1-antitrypsin deficiency   Neuro/Psych   Within defined limits           Cardiovascular    Hypertension: well controlled          CAD and CABG    Exercise tolerance: >4 METS     GI/Hepatic/Renal     GERD: well controlled           Endo/Other             Other Findings   Comments: Chronic lumbar pain           Physical Exam    Airway  Mallampati: II      Mouth opening: Normal     Cardiovascular  Regular rate and rhythm,  S1 and S2 normal,  no murmur, click, rub, or gallop             Dental    Dentition: Full upper dentures     Pulmonary  Breath sounds clear to auscultation               Abdominal         Other Findings            Anesthetic Plan    ASA: 3  Anesthesia type: general          Induction: Intravenous  Anesthetic plan and risks discussed with: Patient and Spouse

## 2017-06-13 NOTE — ANESTHESIA POSTPROCEDURE EVALUATION
Post-Anesthesia Evaluation and Assessment    Patient: Gato Rockwell MRN: [id number]  SSN: [id number]    YOB: 1950  Age: 78 y.o. Sex: male       Cardiovascular Function/Vital Signs  Visit Vitals    /62    Pulse 69    Temp 36.5 °C (97.7 °F)    Resp 16    Ht 5' 6\" (1.676 m)    Wt 80 kg (176 lb 5 oz)    SpO2 96%    BMI 28.46 kg/m2       Patient is status post general anesthesia for Procedure(s):  REVISION L3 L4 L5 S1 LAMINECTOMY AND FUSION TLIF AND INSTRUMENTATION. Nausea/Vomiting: None    Postoperative hydration reviewed and adequate. Pain:  Pain Scale 1: Adult Nonverbal Pain Scale (06/13/17 1752)  Pain Intensity 1: 0 (06/13/17 1752)   Managed    Neurological Status:   Neuro (WDL): Within Defined Limits (06/13/17 1700)  Neuro  LUE Motor Response: Purposeful (06/13/17 1700)  LLE Motor Response: Purposeful (06/13/17 1700)  RUE Motor Response: Purposeful (06/13/17 1700)  RLE Motor Response: Purposeful (06/13/17 1700)   At baseline    Mental Status and Level of Consciousness: Arousable    Pulmonary Status:   O2 Device: Nasal cannula (06/13/17 1700)   Adequate oxygenation and airway patent    Complications related to anesthesia: None    Post-anesthesia assessment completed.  No concerns    Signed By: Kristian Fountain MD     June 13, 2017

## 2017-06-13 NOTE — PROGRESS NOTES
Pre-op prayer request received. Prayer and support given to patient and spouse. Rev.  [employer]

## 2017-06-14 PROCEDURE — 74011250637 HC RX REV CODE- 250/637: Performed by: ORTHOPAEDIC SURGERY

## 2017-06-14 PROCEDURE — 74011250636 HC RX REV CODE- 250/636: Performed by: ORTHOPAEDIC SURGERY

## 2017-06-14 PROCEDURE — 65270000029 HC RM PRIVATE

## 2017-06-14 PROCEDURE — 97161 PT EVAL LOW COMPLEX 20 MIN: CPT

## 2017-06-14 PROCEDURE — 94760 N-INVAS EAR/PLS OXIMETRY 1: CPT

## 2017-06-14 PROCEDURE — 97530 THERAPEUTIC ACTIVITIES: CPT

## 2017-06-14 PROCEDURE — 97165 OT EVAL LOW COMPLEX 30 MIN: CPT

## 2017-06-14 RX ADMIN — CEFAZOLIN 2 G: 1 INJECTION, POWDER, FOR SOLUTION INTRAMUSCULAR; INTRAVENOUS; PARENTERAL at 13:12

## 2017-06-14 RX ADMIN — DOCUSATE SODIUM 100 MG: 100 CAPSULE, LIQUID FILLED ORAL at 17:14

## 2017-06-14 RX ADMIN — CEFAZOLIN 2 G: 1 INJECTION, POWDER, FOR SOLUTION INTRAMUSCULAR; INTRAVENOUS; PARENTERAL at 05:21

## 2017-06-14 RX ADMIN — ACETAMINOPHEN 650 MG: 325 TABLET ORAL at 10:22

## 2017-06-14 RX ADMIN — ACETAMINOPHEN 650 MG: 325 TABLET ORAL at 05:21

## 2017-06-14 RX ADMIN — OXYCODONE HYDROCHLORIDE 10 MG: 5 TABLET ORAL at 09:30

## 2017-06-14 RX ADMIN — OXYCODONE HYDROCHLORIDE 10 MG: 5 TABLET ORAL at 02:48

## 2017-06-14 RX ADMIN — ACETAMINOPHEN 650 MG: 325 TABLET ORAL at 16:44

## 2017-06-14 RX ADMIN — OXYCODONE HYDROCHLORIDE 10 MG: 5 TABLET ORAL at 21:11

## 2017-06-14 RX ADMIN — ROSUVASTATIN CALCIUM 20 MG: 20 TABLET ORAL at 05:25

## 2017-06-14 RX ADMIN — LISINOPRIL 5 MG: 5 TABLET ORAL at 09:31

## 2017-06-14 RX ADMIN — OXYCODONE HYDROCHLORIDE 10 MG: 5 TABLET ORAL at 13:12

## 2017-06-14 RX ADMIN — METOPROLOL SUCCINATE 25 MG: 25 TABLET, EXTENDED RELEASE ORAL at 05:21

## 2017-06-14 RX ADMIN — Medication 10 ML: at 05:21

## 2017-06-14 RX ADMIN — OXYCODONE HYDROCHLORIDE 10 MG: 5 TABLET ORAL at 17:03

## 2017-06-14 RX ADMIN — CYCLOBENZAPRINE HYDROCHLORIDE 10 MG: 10 TABLET, FILM COATED ORAL at 10:22

## 2017-06-14 RX ADMIN — Medication 5 ML: at 13:13

## 2017-06-14 RX ADMIN — FAMOTIDINE 20 MG: 20 TABLET, FILM COATED ORAL at 09:30

## 2017-06-14 RX ADMIN — FAMOTIDINE 20 MG: 20 TABLET, FILM COATED ORAL at 21:11

## 2017-06-14 RX ADMIN — TAMSULOSIN HYDROCHLORIDE 0.4 MG: 0.4 CAPSULE ORAL at 21:11

## 2017-06-14 RX ADMIN — Medication 5 ML: at 21:20

## 2017-06-14 RX ADMIN — DOCUSATE SODIUM 100 MG: 100 CAPSULE, LIQUID FILLED ORAL at 09:31

## 2017-06-14 NOTE — PROGRESS NOTES
Problem: Mobility Impaired (Adult and Pediatric)  Goal: *Acute Goals and Plan of Care (Insert Text)  LTG:  (1.)Mr. Lu Berry will move from supine to sit and sit to supine, scoot up and down and roll side to side INDEPENDENTLY demonstrating log roll technique with bed flat within 7 day(s). (2.)Mr. Lu Berry will transfer from bed to chair and chair to bed INDEPENDENTLY using the least restrictive device within 7 day(s). (3.)Mr. Lu Berry will ambulate with MODIFIED INDEPENDENCE for 500+ feet with the least restrictive device within 7 day(s). (4.)Mr. Lu Berry will ascend and descend 2 steps with SUPERVISION within 7 days. (5.)Mr. Lu Berry will independently verbalize 3/3 post op spinal precautions within 7 days. ________________________________________________________________________________________________      PHYSICAL THERAPY: INITIAL ASSESSMENT, AM 6/14/2017  INPATIENT: Hospital Day: 2  [place] / Product Type: Medicare /      NAME/AGE/GENDER: Valentina Gomes is a 78 y.o. male           PRIMARY DIAGNOSIS: Lumbar spinal stenosis [M48.06] <principal problem not specified> <principal problem not specified>  Procedure(s) (LRB):  REVISION L3 L4 L5 S1 LAMINECTOMY AND FUSION TLIF AND INSTRUMENTATION (N/A)  1 Day Post-Op  ICD-10: Treatment Diagnosis:       · Difficulty in walking, Not elsewhere classified (R26.2)  · Low Back Pain (M54.5)   Precaution/Allergies:  Codeine       ASSESSMENT:      Mr. Lu Berry is a 78year old male who is seen for initial therapy assessment following revision, L3-S1 TLIF. He presents sitting up in chair after working with OT. He is agreeable to therapy assessment this morning although does complain of significant post op pain despite recent pain medications. Reviewed spinal precautions and log roll technique and importance of post op mobility/ambulation. Pt performs transfers with CGA and ambulates 250 ft in hallway with RW for support and CGA-SBA for safety.  Mildly flexed posture with slow gait pace however overall with fair+ to good balance with walker and good safety awareness. Returned to room and up to straight back chair again, encouraged to sit for 30 minutes more and call for staff assist with return to bed. Delaney Leavitt is demonstrating expected post op incisional pain and some continued pain in left LE which was present prior to surgery. He is moving fairly well overall but functioning below his baseline and will benefit from continued therapy during acute care stay to maximize strength, safety, and independence with all ambulation/transfers to to continue education on post op care. Great rehab potential due to higher prior level of function. This section established at most recent assessment   PROBLEM LIST (Impairments causing functional limitations):  1. Decreased Strength  2. Decreased Transfer Abilities  3. Decreased Ambulation Ability/Technique  4. Decreased Balance  5. Increased Pain  6. Decreased Activity Tolerance  7. Decreased Knowledge of Precautions    INTERVENTIONS PLANNED: (Benefits and precautions of physical therapy have been discussed with the patient.)  1. Balance Exercise  2. Bed Mobility  3. Gait Training  4. Therapeutic Activites  5. Therapeutic Exercise/Strengthening  6. Transfer Training  7. Group Therapy      TREATMENT PLAN: Frequency/Duration: twice daily for duration of hospital stay  Rehabilitation Potential For Stated Goals: GOOD      RECOMMENDED REHABILITATION/EQUIPMENT: (at time of discharge pending progress): Continue Skilled Therapy and discharge needs TBD pending progress;  PT vs none? Mike Dumont HISTORY:   History of Present Injury/Illness (Reason for Referral):  Per H&P, \"The patient returns today with persistent symptoms of lumbar claudication affecting the right side with functional deficit as previously described.  The symptoms are predominately in the right buttock, lateral and posterior thigh, and lateral and posterior shin. The symptoms have been present for 9 months. The symptoms are described as a deep ache, spasm, and numbness. The symptoms are worse with activities on his feet including walking, standing. Any activities requiring walking or standing are quite limited. There has been no lasting benefit from conservative efforts. The best relief he had was 3 weeks of benefit (70%) after a recent epidural steroid injection. At this point he is ready to proceed with surgical intervention\"     Past Medical History/Comorbidities:   Mr. Kassie Carnes  has a past medical history of Abnormal cardiovascular function study (1/26/2016); Adenoma of left adrenal gland; Arthritis; BPH (benign prostatic hyperplasia); Chest discomfort (1/26/2016); Chronic low back pain; COPD (chronic obstructive pulmonary disease) ([facility] [address] Coronary artery disease; ED (erectile dysfunction); GERD (gastroesophageal reflux disease); History of basal cell cancer; Hypertension; Mixed hyperlipidemia; Nausea & vomiting; Palpitations; Pleural effusion exudative (9/30/15); and Prostatic hypertrophy, benign (1/26/2016). He also has no past medical history of Unspecified sleep apnea. Mr. Kassie Carnes  has a past surgical history that includes colonoscopy (08/2011); refractive surgery; heent (Bilateral, 8/2003); mohs procedure (Left, 2008); cardiac surg procedure unlist (9/2015); other surgical; total hip arthroplasty (Left, 3/2014); lumbar fusion (2013); and abdomen surgery proc unlisted (Bilateral). Social History/Living Environment:   Home Environment: Private residence  # Steps to Enter: 2  Wheelchair Ramp: No  One/Two Story Residence: One story  Living Alone: No  Support Systems: Spouse/Significant Other/Partner, Child(omar)  Patient Expects to be Discharged to[de-identified] Private residence  Current DME Used/Available at Home: Aura Cousin, straight, Walker, rolling  Prior Level of Function/Work/Activity:  Minh Luu lives with wife in single story home with 2 steps to enter.  He is ambulatory at baseline with cane or occasionally walker due to back and R LE pain. Denies falls. Was working but retired last week. Drives. Number of Personal Factors/Comorbidities that affect the Plan of Care: 1-2: MODERATE COMPLEXITY   EXAMINATION:   Most Recent Physical Functioning:   Gross Assessment:  AROM: Within functional limits  Strength: Generally decreased, functional  Coordination: Within functional limits               Posture:  Posture (WDL): Exceptions to WDL  Posture Assessment: Forward head, Trunk flexion  Balance:  Sitting: Intact  Standing: Impaired  Standing - Static: Fair (+)  Standing - Dynamic : Fair (+) Bed Mobility:     Wheelchair Mobility:     Transfers:  Sit to Stand: Contact guard assistance  Stand to Sit: Contact guard assistance  Gait:     Base of Support: Center of gravity altered  Speed/Anastasiya: Slow  Step Length: Left shortened;Right shortened  Gait Abnormalities: Trunk sway increased; Path deviations  Distance (ft): 250 Feet (ft)  Assistive Device: Walker, rolling  Ambulation - Level of Assistance: Contact guard assistance;Stand-by asssistance  Interventions: Verbal cues; Visual/Demos; Safety awareness training       Body Structures Involved:  1. Bones  2. Joints  3. Muscles Body Functions Affected:  1. Sensory/Pain  2. Movement Related  3. Skin Related Activities and Participation Affected:  1. General Tasks and Demands  2. Mobility  3. Self Care  4. Domestic Life  5. Community, Social and [place]   Number of elements that affect the Plan of Care: 4+: HIGH COMPLEXITY   CLINICAL PRESENTATION:   Presentation: Stable and uncomplicated: LOW COMPLEXITY   CLINICAL DECISION MAKIN [facility] Mobility Inpatient Short Form  How much difficulty does the patient currently have. .. Unable A Lot A Little None   1. Turning over in bed (including adjusting bedclothes, sheets and blankets)? [ ] 1   [ ] 2   [ ] 3   [X] 4   2.   Sitting down on and standing up from a chair with arms ( e.g., wheelchair, bedside commode, etc.)   [ ] 1   [ ] 2   [ ] 3   [X] 4   3. Moving from lying on back to sitting on the side of the bed? [ ] 1   [ ] 2   [ ] 3   [X] 4   How much help from another person does the patient currently need. .. Total A Lot A Little None   4. Moving to and from a bed to a chair (including a wheelchair)? [ ] 1   [ ] 2   [X] 3   [ ] 4   5. Need to walk in hospital room? [ ] 1   [ ] 2   [X] 3   [ ] 4   6. Climbing 3-5 steps with a railing? [ ] 1   [ ] 2   [X] 3   [ ] 4   © 2007, Trustees of [address] [place] [address] [address] [id number], under license to [employer]. All rights reserved    Score:  Initial: 21 Most Recent: X (Date: -- )     Interpretation of Tool:  Represents activities that are increasingly more difficult (i.e. Bed mobility, Transfers, Gait). Score 24 23 22-20 19-15 14-10 9-7 6       Modifier CH CI CJ CK CL CM CN         · Mobility - Walking and Moving Around:               - CURRENT STATUS:    CJ - 20%-39% impaired, limited or restricted               - GOAL STATUS:           CH - 0% impaired, limited or restricted               - D/C STATUS:                       ---------------To be determined---------------  [place] / Product Type: Medicare /       Medical Necessity:     · Patient demonstrates good rehab potential due to higher previous functional level. Reason for Services/Other Comments:  · Patient continues to demonstrate capacity to improve strength, mobility, balance, transfers, activity tolerance which will increase independence and increase safety.    Use of outcome tool(s) and clinical judgement create a POC that gives a: Clear prediction of patient's progress: LOW COMPLEXITY                 TREATMENT:   (In addition to Assessment/Re-Assessment sessions the following treatments were rendered)   Pre-treatment Symptoms/Complaints:  \"I'm still having some pain\"  Pain: Initial:   Pain Intensity 1: 7  Pain Location 1: Incisional, Leg  Pain Orientation 1: Right  Pain Intervention(s) 1: Repositioned, Ambulation/Increased Activity (pt recently received pain meds)  Post Session:  5/10 after recent pain medication      Assessment/Reassessment only, no treatment provided today     Braces/Orthotics/Lines/Etc:   · castro catheter  · drain hemovac  · O2 Device: Room air  Treatment/Session Assessment:    · Response to Treatment:  Pt performs mobility with CGA-SBA assist  · Interdisciplinary Collaboration:  · Physical Therapist  · Registered Nurse  · After treatment position/precautions:  · Up in chair  · Bed/Chair-wheels locked  · Bed in low position  · Call light within reach  · Family at bedside  · Compliance with Program/Exercises: Will assess as treatment progresses. · Recommendations/Intent for next treatment session: \"Next visit will focus on advancements to more challenging activities and reduction in assistance provided\".   Total Treatment Duration:  PT Patient Time In/Time Out  Time In: 1027  Time Out: 1001 Whit Ayers DPT

## 2017-06-14 NOTE — PROGRESS NOTES
Problem: Self Care Deficits Care Plan (Adult)  Goal: *Acute Goals and Plan of Care (Insert Text)  1. Patient will verbalize and demonstrate understanding of spinal precautions with 100% accuracy during ADLs. 2. Patient will complete lower body bathing and dressing with minimal assistance and adaptive equipment as needed. 3. Patient will complete functional transfers with supervision and adaptive equipment as needed. 4. Patient will complete toileting and toilet transfer with supervision. 5. Patient will complete functional mobility of household distances with supervision and adaptive equipment as needed. 6. Patient will demonstrate ability to log roll in bed with supervision and no verbal cues from therapist.     Timeframe: 7 visits       OCCUPATIONAL THERAPY: Initial Assessment 6/14/2017  INPATIENT: Hospital Day: 2  [place] / Product Type: Medicare /      NAME/AGE/GENDER: Jessenia Cordon is a 78 y.o. male           PRIMARY DIAGNOSIS:  Lumbar spinal stenosis [M48.06] Lumbar stenosis with neurogenic claudication Lumbar stenosis with neurogenic claudication  Procedure(s) (LRB):  REVISION L3 L4 L5 S1 LAMINECTOMY AND FUSION TLIF AND INSTRUMENTATION (N/A)  1 Day Post-Op  ICD-10: Treatment Diagnosis:        · Low Back Pain (M54.5)   Precautions/Allergies:        spinal precautions    Codeine       ASSESSMENT:      Mr. Dereck Smith is a 78year old male who is now s/p above procedure. Patient lives with wife and is typically independent with ADLs and driving. Patient sitting edge of bed upon arrival. Reports pain 10/10. RN aware and already administered pain meds.  and O2 94% on room air. Patient was educated on spinal precautions and log roll technique. He was able to stand with minimal assistance and take steps to chair with CGA and rolling walker.  Patient is currently functioning below his independent baseline and would benefit from continued OT to increase independence and safety. Will follow. This section established at most recent assessment   PROBLEM LIST (Impairments causing functional limitations):  1. Decreased ADL/Functional Activities  2. Decreased Transfer Abilities  3. Decreased Ambulation Ability/Technique  4. Decreased Balance  5. Increased Pain  6. Decreased Activity Tolerance  7. Decreased Flexibility/Joint Mobility  8. Decreased Knowledge of Precautions    INTERVENTIONS PLANNED: (Benefits and precautions of occupational therapy have been discussed with the patient.)  1. Activities of daily living training  2. Adaptive equipment training  3. Group therapy  4. Therapeutic activity  5. Therapeutic exercise      TREATMENT PLAN: Frequency/Duration: Follow patient 3x/ week to address above goals. Rehabilitation Potential For Stated Goals: GOOD      RECOMMENDED REHABILITATION/EQUIPMENT: (at time of discharge pending progress): Continue Skilled Therapy. OCCUPATIONAL PROFILE AND HISTORY:   History of Present Injury/Illness (Reason for Referral):  S/p above procedure   Past Medical History/Comorbidities:   Mr. Jacqueline Niño  has a past medical history of Abnormal cardiovascular function study (1/26/2016); Adenoma of left adrenal gland; Arthritis; BPH (benign prostatic hyperplasia); Chest discomfort (1/26/2016); Chronic low back pain; COPD (chronic obstructive pulmonary disease) ([facility] [address] Coronary artery disease; ED (erectile dysfunction); GERD (gastroesophageal reflux disease); History of basal cell cancer; Hypertension; Mixed hyperlipidemia; Nausea & vomiting; Palpitations; Pleural effusion exudative (9/30/15); and Prostatic hypertrophy, benign (1/26/2016). He also has no past medical history of Unspecified sleep apnea.   Mr. Jacqueline Niño  has a past surgical history that includes colonoscopy (08/2011); refractive surgery; heent (Bilateral, 8/2003); mohs procedure (Left, 2008); cardiac surg procedure unlist (9/2015); other surgical; total hip arthroplasty (Left, 3/2014); lumbar fusion (2013); and abdomen surgery proc unlisted (Bilateral). Social History/Living Environment:   Home Environment: Private residence  # Steps to Enter: 2  Wheelchair Ramp: No  One/Two Story Residence: One story  Living Alone: No  Support Systems: Spouse/Significant Other/Partner, Child(omar)  Patient Expects to be Discharged to[de-identified] Private residence  Current DME Used/Available at Home: Hermelindo Coup, straight, Walker, rolling  Tub or Shower Type: Tub/Shower combination  Prior Level of Function/Work/Activity:  Patient lives with wife. Independent with ADLs and driving. Retired. Number of Personal Factors/Comorbidities that affect the Plan of Care: Brief history (0):  LOW COMPLEXITY   ASSESSMENT OF OCCUPATIONAL PERFORMANCE[de-identified]   Activities of Daily Living:          Basic ADLs (From Assessment) Complex ADLs (From Assessment)   Basic ADL  Feeding: Setup  Oral Facial Hygiene/Grooming: Setup  Bathing: Minimum assistance  Upper Body Dressing: Setup  Lower Body Dressing: Moderate assistance  Toileting: Minimum assistance Instrumental ADL  Meal Preparation: Maximum assistance  Homemaking: Maximum assistance   Grooming/Bathing/Dressing Activities of Daily Living     Cognitive Retraining  Safety/Judgement: Awareness of environment; Fall prevention; Insight into deficits                       Bed/Mat Mobility  Rolling: Supervision  Supine to Sit: Supervision  Sit to Stand: Stand-by asssistance  Bed to Chair: Stand-by asssistance  Scooting: Supervision          Most Recent Physical Functioning:   Gross Assessment:  AROM: Within functional limits (BUE)  Strength: Within functional limits (BUE)               Posture:  Posture (WDL): Exceptions to WDL  Posture Assessment:  Forward head, Trunk flexion  Balance:  Sitting: Intact  Standing: Impaired  Standing - Static: Good  Standing - Dynamic : Fair (+) Bed Mobility:  Rolling: Supervision  Supine to Sit: Supervision  Scooting: Supervision  Wheelchair Mobility:     Transfers:  Sit to Stand: Stand-by asssistance  Stand to Sit: Stand-by asssistance  Bed to Chair: Stand-by asssistance  Interventions: Verbal cues; Visual cues; Safety awareness training; Tactile cues  Duration: 15 Minutes                    Patient Vitals for the past 6 hrs:       BP BP Patient Position SpO2 Pulse   17 1139 106/46 At rest 95 % 93   17 1442 97/53 Sitting 93 % (!) 104        Mental Status  Neurologic State: Alert  Orientation Level: Oriented X4  Cognition: Follows commands  Perception: Appears intact  Perseveration: No perseveration noted  Safety/Judgement: Awareness of environment, Fall prevention, Insight into deficits                               Physical Skills Involved:  1. Range of Motion  2. Balance  3. Activity Tolerance  4. Pain (acute) Cognitive Skills Affected (resulting in the inability to perform in a timely and safe manner):  1. Awareness of precautions Psychosocial Skills Affected:  1. Habits/Routines  2. Environmental Adaptation   Number of elements that affect the Plan of Care: 5+:  HIGH COMPLEXITY   CLINICAL DECISION MAKIN [facility] Mobility Inpatient Short Form  How much help from another person does the patient currently need. .. Total A Lot A Little None   1. Putting on and taking off regular lower body clothing?   [ ] 1   [X] 2   [ ] 3   [ ] 4   2. Bathing (including washing, rinsing, drying)? [ ] 1   [X] 2   [ ] 3   [ ] 4   3. Toileting, which includes using toilet, bedpan or urinal?   [ ] 1   [X] 2   [ ] 3   [ ] 4   4. Putting on and taking off regular upper body clothing?   [ ] 1   [ ] 2   [X] 3   [ ] 4   5. Taking care of personal grooming such as brushing teeth? [ ] 1   [ ] 2   [X] 3   [ ] 4   6. Eating meals? [ ] 1   [ ] 2   [X] 3   [ ] 4   © , Trustees of [address] [place] [address] [address] [address], under license to [employer].  All rights reserved    Score:  Initial:15 Most Recent: X (Date: -- )     Interpretation of Tool:  Represents activities that are increasingly more difficult (i.e. Bed mobility, Transfers, Gait). Score 24 23 22-20 19-15 14-10 9-7 6       Modifier CH CI CJ CK CL CM CN         · Self Care:               - CURRENT STATUS:    CK - 40%-59% impaired, limited or restricted               - GOAL STATUS:           CJ - 20%-39% impaired, limited or restricted               - D/C STATUS:                       ---------------To be determined---------------  [place] / Product Type: Medicare /       Medical Necessity:     · Patient demonstrates good rehab potential due to higher previous functional level. Reason for Services/Other Comments:  · Patient continues to require present interventions due to patient's inability to care for self while maintaining spinal precautions. Use of outcome tool(s) and clinical judgement create a POC that gives a: MODERATE COMPLEXITY             TREATMENT:   (In addition to Assessment/Re-Assessment sessions the following treatments were rendered)      Pre-treatment Symptoms/Complaints:  None   Pain: Initial:   Pain Intensity 1: 10  Pain Location 1: Back  Pain Intervention(s) 1: Nurse notified (RN already aware and gave medications)  Post Session:  Same. RN notified of need for more pain meds. Assessment/Reassessment only, no treatment provided today     Braces/Orthotics/Lines/Etc:   · IV  · O2 Device: Room air  Treatment/Session Assessment:    · Response to Treatment:  Tolerated well despite pain  · Interdisciplinary Collaboration:  · Occupational Therapist  · Registered Nurse  · Certified Nursing Assistant/Patient Care Technician  · After treatment position/precautions:  · Up in chair  · Bed/Chair-wheels locked  · Bed in low position  · Call light within reach  · RN notified  · Family at bedside  · Compliance with Program/Exercises: compliant all of the time. · Recommendations/Intent for next treatment session:   \"Next visit will focus on advancements to more challenging activities and reduction in assistance provided\".   Total Treatment Duration:  OT Patient Time In/Time Out  Time In: 1002  Time Out: 14[address] [place] OTR/L

## 2017-06-14 NOTE — PROGRESS NOTES
Problem: Mobility Impaired (Adult and Pediatric)  Goal: *Acute Goals and Plan of Care (Insert Text)  LTG:  (1.)Mr. Jacqueline Niño will move from supine to sit and sit to supine, scoot up and down and roll side to side INDEPENDENTLY demonstrating log roll technique with bed flat within 7 day(s). (2.)Mr. Jacqueline Niño will transfer from bed to chair and chair to bed INDEPENDENTLY using the least restrictive device within 7 day(s). (3.)Mr. Jacqueline Niño will ambulate with MODIFIED INDEPENDENCE for 500+ feet with the least restrictive device within 7 day(s). (4.)Mr. Jacqueline Niño will ascend and descend 2 steps with SUPERVISION within 7 days. (5.)Mr. Jacqueline Niño will independently verbalize 3/3 post op spinal precautions within 7 days. ________________________________________________________________________________________________      PHYSICAL THERAPY: Daily Note, Treatment Day: Day of Assessment and PM 6/14/2017  INPATIENT: Hospital Day: 2  [place] / Product Type: Medicare /      NAME/AGE/GENDER: Joanne Cleary is a 78 y.o. male           PRIMARY DIAGNOSIS: Lumbar spinal stenosis [M48.06] Lumbar stenosis with neurogenic claudication Lumbar stenosis with neurogenic claudication  Procedure(s) (LRB):  REVISION L3 L4 L5 S1 LAMINECTOMY AND FUSION TLIF AND INSTRUMENTATION (N/A)  1 Day Post-Op  ICD-10: Treatment Diagnosis:       · Difficulty in walking, Not elsewhere classified (R26.2)  · Low Back Pain (M54.5)   Precaution/Allergies:  Codeine       ASSESSMENT:      Mr. Jacqueline Niño presents in supine without complaints after recent pain medication. Demonstrates log roll with supervision and no instruction/cueing. Sit-stand transfers with SBA. Pt ambulates total of 450 ft in hallway, initially using walker for first 300 ft then without walker and with close CGA for last 150 ft.  Pt demonstrates mildly decreased balance and decreased activity tolerance compared to baseline but overall appears to be moving well and has shown progress this afternoon towards goals, requiring less assistance and able to increase gait distance. Will benefit from a few additional therapy sessions during hospital stay to maximize safety and independence with mobility. This section established at most recent assessment   PROBLEM LIST (Impairments causing functional limitations):  1. Decreased Strength  2. Decreased Transfer Abilities  3. Decreased Ambulation Ability/Technique  4. Decreased Balance  5. Increased Pain  6. Decreased Activity Tolerance  7. Decreased Knowledge of Precautions    INTERVENTIONS PLANNED: (Benefits and precautions of physical therapy have been discussed with the patient.)  1. Balance Exercise  2. Bed Mobility  3. Gait Training  4. Therapeutic Activites  5. Therapeutic Exercise/Strengthening  6. Transfer Training  7. Group Therapy      TREATMENT PLAN: Frequency/Duration: twice daily for duration of hospital stay  Rehabilitation Potential For Stated Goals: GOOD      RECOMMENDED REHABILITATION/EQUIPMENT: (at time of discharge pending progress): Continue Skilled Therapy and discharge needs TBD pending progress;  PT vs none? Sidney Barthel HISTORY:   History of Present Injury/Illness (Reason for Referral):  Per H&P, \"The patient returns today with persistent symptoms of lumbar claudication affecting the right side with functional deficit as previously described. The symptoms are predominately in the right buttock, lateral and posterior thigh, and lateral and posterior shin. The symptoms have been present for 9 months. The symptoms are described as a deep ache, spasm, and numbness. The symptoms are worse with activities on his feet including walking, standing. Any activities requiring walking or standing are quite limited. There has been no lasting benefit from conservative efforts. The best relief he had was 3 weeks of benefit (70%) after a recent epidural steroid injection.  At this point he is ready to proceed with surgical intervention\" Past Medical History/Comorbidities:   Mr. Yola Balderas  has a past medical history of Abnormal cardiovascular function study (1/26/2016); Adenoma of left adrenal gland; Arthritis; BPH (benign prostatic hyperplasia); Chest discomfort (1/26/2016); Chronic low back pain; COPD (chronic obstructive pulmonary disease) ([facility] [address] Coronary artery disease; ED (erectile dysfunction); GERD (gastroesophageal reflux disease); History of basal cell cancer; Hypertension; Mixed hyperlipidemia; Nausea & vomiting; Palpitations; Pleural effusion exudative (9/30/15); and Prostatic hypertrophy, benign (1/26/2016). He also has no past medical history of Unspecified sleep apnea. Mr. Yola Baldersa  has a past surgical history that includes colonoscopy (08/2011); refractive surgery; heent (Bilateral, 8/2003); mohs procedure (Left, 2008); cardiac surg procedure unlist (9/2015); other surgical; total hip arthroplasty (Left, 3/2014); lumbar fusion (2013); and abdomen surgery proc unlisted (Bilateral). Social History/Living Environment:   Home Environment: Private residence  # Steps to Enter: 2  Wheelchair Ramp: No  One/Two Story Residence: One story  Living Alone: No  Support Systems: Spouse/Significant Other/Partner, Child(omar)  Patient Expects to be Discharged to[de-identified] Private residence  Current DME Used/Available at Home: Eleuterio Grit, straight, Walker, rolling  Tub or Shower Type: Tub/Shower combination  Prior Level of Function/Work/Activity:  Jordin Jones lives with wife in single story home with 2 steps to enter. He is ambulatory at baseline with cane or occasionally walker due to back and R LE pain. Denies falls. Was working but retired last week. Drives.        Number of Personal Factors/Comorbidities that affect the Plan of Care: 1-2: MODERATE COMPLEXITY   EXAMINATION:   Most Recent Physical Functioning:   Gross Assessment:  AROM: Within functional limits  Strength: Generally decreased, functional  Coordination: Within functional limits               Posture:  Posture (WDL): Exceptions to WDL  Posture Assessment: Forward head, Trunk flexion  Balance:  Sitting: Intact  Standing: Impaired  Standing - Static: Good  Standing - Dynamic : Fair (+) Bed Mobility:  Rolling: Supervision  Supine to Sit: Supervision  Scooting: Supervision  Wheelchair Mobility:     Transfers:  Sit to Stand: Stand-by asssistance  Stand to Sit: Stand-by asssistance  Bed to Chair: Stand-by asssistance  Interventions: Verbal cues; Visual cues; Safety awareness training; Tactile cues  Duration: 15 Minutes  Gait:     Base of Support: Center of gravity altered  Speed/Anastasiya: Pace decreased (<100 feet/min)  Step Length: Left shortened;Right shortened  Gait Abnormalities: Trunk sway increased; Path deviations  Distance (ft): 450 Feet (ft)  Assistive Device: Walker, rolling (initially then without walker for last 150 ft)  Ambulation - Level of Assistance: Contact guard assistance;Stand-by asssistance  Interventions: Verbal cues; Visual/Demos; Safety awareness training; Tactile cues       Body Structures Involved:  1. Bones  2. Joints  3. Muscles Body Functions Affected:  1. Sensory/Pain  2. Movement Related  3. Skin Related Activities and Participation Affected:  1. General Tasks and Demands  2. Mobility  3. Self Care  4. Domestic Life  5. Community, Social and [place]   Number of elements that affect the Plan of Care: 4+: HIGH COMPLEXITY   CLINICAL PRESENTATION:   Presentation: Stable and uncomplicated: LOW COMPLEXITY   CLINICAL DECISION MAKIN [facility] Inpatient Short Form  How much difficulty does the patient currently have. .. Unable A Lot A Little None   1. Turning over in bed (including adjusting bedclothes, sheets and blankets)? [ ] 1   [ ] 2   [ ] 3   [X] 4   2. Sitting down on and standing up from a chair with arms ( e.g., wheelchair, bedside commode, etc.)   [ ] 1   [ ] 2   [ ] 3   [X] 4   3. Moving from lying on back to sitting on the side of the bed?    [ ] 1 [ ] 2   [ ] 3   [X] 4   How much help from another person does the patient currently need. .. Total A Lot A Little None   4. Moving to and from a bed to a chair (including a wheelchair)? [ ] 1   [ ] 2   [X] 3   [ ] 4   5. Need to walk in hospital room? [ ] 1   [ ] 2   [X] 3   [ ] 4   6. Climbing 3-5 steps with a railing? [ ] 1   [ ] 2   [X] 3   [ ] 4   © 2007, Trustees of [address] [place] [address] [address] [address], under license to [employer]. All rights reserved    Score:  Initial: 21 Most Recent: X (Date: -- )     Interpretation of Tool:  Represents activities that are increasingly more difficult (i.e. Bed mobility, Transfers, Gait). Score 24 23 22-20 19-15 14-10 9-7 6       Modifier CH CI CJ CK CL CM CN         · Mobility - Walking and Moving Around:               - CURRENT STATUS:    CJ - 20%-39% impaired, limited or restricted               - GOAL STATUS:           CH - 0% impaired, limited or restricted               - D/C STATUS:                       ---------------To be determined---------------  [place] / Product Type: Medicare /       Medical Necessity:     · Patient demonstrates good rehab potential due to higher previous functional level. Reason for Services/Other Comments:  · Patient continues to demonstrate capacity to improve strength, mobility, balance, transfers, activity tolerance which will increase independence and increase safety.    Use of outcome tool(s) and clinical judgement create a POC that gives a: Clear prediction of patient's progress: LOW COMPLEXITY                 TREATMENT:   (In addition to Assessment/Re-Assessment sessions the following treatments were rendered)   Pre-treatment Symptoms/Complaints:  \"I feel better\"  Pain: Initial:   Pain Intensity 1: 0  Pain Location 1: Incisional, Leg  Pain Orientation 1: Right  Pain Intervention(s) 1: Repositioned, Ambulation/Increased Activity (pt recently received pain meds)  Post Session: 0/10      Therapeutic Activity: (  15 Minutes ):  Therapeutic activities including Bed mobility, sit-stand transfers,, Chair transfers and Ambulation on level ground to improve mobility, strength, balance, coordination and activity tolerance. Required minimal Verbal cues; Visual/Demos; Safety awareness training; Tactile cues to promote dynamic balance in standing and promote motor control of bilateral, upper extremity(s), lower extremity(s). Braces/Orthotics/Lines/Etc:   · drain hemovac  Treatment/Session Assessment:    · Response to Treatment:  Pt increased gait distance, less assist   · Interdisciplinary Collaboration:  · Physical Therapist  · Registered Nurse  · After treatment position/precautions:  · Up in chair, Bed/Chair-wheels locked, Bed in low position and Call light within reach  · Compliance with Program/Exercises: Will assess as treatment progresses. · Recommendations/Intent for next treatment session: \"Next visit will focus on advancements to more challenging activities and reduction in assistance provided\".   Total Treatment Duration:  PT Patient Time In/Time Out  Time In: 1405  Time Out: [address] Alyssia Crystal DPT

## 2017-06-15 VITALS
WEIGHT: 176.31 LBS | BODY MASS INDEX: 28.33 KG/M2 | DIASTOLIC BLOOD PRESSURE: 66 MMHG | TEMPERATURE: 97.7 F | RESPIRATION RATE: 16 BRPM | OXYGEN SATURATION: 95 % | HEIGHT: 66 IN | HEART RATE: 92 BPM | SYSTOLIC BLOOD PRESSURE: 124 MMHG

## 2017-06-15 PROCEDURE — 97530 THERAPEUTIC ACTIVITIES: CPT

## 2017-06-15 PROCEDURE — 74011250637 HC RX REV CODE- 250/637: Performed by: ORTHOPAEDIC SURGERY

## 2017-06-15 RX ORDER — HYDROCODONE BITARTRATE AND ACETAMINOPHEN 7.5; 325 MG/1; MG/1
1 TABLET ORAL
Qty: 60 TAB | Refills: 0 | Status: SHIPPED | OUTPATIENT
Start: 2017-06-15 | End: 2018-01-05 | Stop reason: CLARIF

## 2017-06-15 RX ADMIN — ACETAMINOPHEN 650 MG: 325 TABLET ORAL at 05:34

## 2017-06-15 RX ADMIN — OXYCODONE HYDROCHLORIDE 10 MG: 5 TABLET ORAL at 09:17

## 2017-06-15 RX ADMIN — LISINOPRIL 5 MG: 5 TABLET ORAL at 09:17

## 2017-06-15 RX ADMIN — ROSUVASTATIN CALCIUM 20 MG: 20 TABLET ORAL at 05:34

## 2017-06-15 RX ADMIN — CYCLOBENZAPRINE HYDROCHLORIDE 10 MG: 10 TABLET, FILM COATED ORAL at 01:29

## 2017-06-15 RX ADMIN — Medication 10 ML: at 13:49

## 2017-06-15 RX ADMIN — ACETAMINOPHEN 650 MG: 325 TABLET ORAL at 12:46

## 2017-06-15 RX ADMIN — METOPROLOL SUCCINATE 25 MG: 25 TABLET, EXTENDED RELEASE ORAL at 05:34

## 2017-06-15 RX ADMIN — OXYCODONE HYDROCHLORIDE 10 MG: 5 TABLET ORAL at 13:46

## 2017-06-15 RX ADMIN — OXYCODONE HYDROCHLORIDE 10 MG: 5 TABLET ORAL at 01:29

## 2017-06-15 RX ADMIN — FAMOTIDINE 20 MG: 20 TABLET, FILM COATED ORAL at 09:17

## 2017-06-15 RX ADMIN — Medication 5 ML: at 06:00

## 2017-06-15 RX ADMIN — ACETAMINOPHEN 650 MG: 325 TABLET ORAL at 01:29

## 2017-06-15 RX ADMIN — DOCUSATE SODIUM 100 MG: 100 CAPSULE, LIQUID FILLED ORAL at 09:18

## 2017-06-15 RX ADMIN — OXYCODONE HYDROCHLORIDE 10 MG: 5 TABLET ORAL at 05:35

## 2017-06-15 NOTE — PROGRESS NOTES
ORTH POST OP PROGRESS NOTE    Reina 15, 2017  Admit Date: 2017  Admit Diagnosis: Lumbar spinal stenosis [M48.06]  Procedure: Procedure(s):  REVISION L3 L4 L5 S1 LAMINECTOMY AND FUSION TLIF AND INSTRUMENTATION  Post Op day: 2 Days Post-Op      Subjective:     Delaney Leavitt is a patient who has no complaints. Ambulated 450'      Objective:     Vital Signs:    Blood pressure 124/66, pulse 92, temperature 97.7 °F (36.5 °C), resp. rate 16, height 5' 6\" (1.676 m), weight 80 kg (176 lb 5 oz), SpO2 95 %. Temp (24hrs), Av °F (37.2 °C), Min:97.7 °F (36.5 °C), Max:101 °F (38.3 °C)          1901 - 06/15 0700  In: -   Out: 1146 [Urine:975; Drains:560]    LAB:    No results for input(s): HGB, WBC, PLT, HGBEXT, PLTEXT in the last 72 hours. Physical Exam    General:   Alert and oriented. No acute distress  Lungs:  Respirations unlabored. Extremities: No evidence of cyanosis. Calves soft, nontender. Moves both upper and lower extremities.    Dressing:  clean, dry, and intact  Neuro:  no deficit      Assessment:      Patient Active Problem List   Diagnosis Code    GERD (gastroesophageal reflux disease) K21.9    Benign hypertrophy of prostate N40.0    Lumbar stenosis with neurogenic claudication M48.06    S/P prosthetic total arthroplasty of the hip Z96.649    Inguinal hernia, left K40.90    Arthritis M19.90    Chronic pain G89.29    Claustrophobia F40.240    Diseases of tricuspid valve I07.9    Inguinal hernia without mention of obstruction or gangrene, unilateral or unspecified, (not specified as recurrent) K40.90    Essential hypertension I10    Impotence of organic origin N52.9    Primary localized osteoarthrosis, lower leg M17.10    Lumbago M54.5    Intermittent palpitations R00.2    COPD (chronic obstructive pulmonary disease) (HCC) J44.9    Mitral regurgitation I34.0    Mixed hyperlipidemia E78.2    CAD (coronary artery disease) I25.10    S/P CABG (coronary artery bypass graft) Z95.1    History of tobacco abuse Z87.891    Alpha-1-antitrypsin deficiency ([facility] [address] E88.01    ED (erectile dysfunction) N52.9    Abnormal cardiovascular function study R94.30    Prostatic hypertrophy, benign N40.0    Chest discomfort R07.89    Snoring R06.83       Plan:     Continue PT  Change dressing   Anticipate Discharge To: HOME today       Signed By: Emily Cody PA-C

## 2017-06-15 NOTE — PROGRESS NOTES
Patient discharged hoe with wife. Reviewed with patient and wife discharge instructions, precautions, follow up appointment, and prescriptions. All questions addressed and answered.

## 2017-06-15 NOTE — DISCHARGE SUMMARY
Discharge Summary    Patient ID:Jasiel Matthews  [id number]  1950  78 y.o. Admit date: 6/13/2017    Discharge date:6/15/2017    Admitting Physician: Amilcar Gil MD    Discharge Physician: Amilcar Gil MD    Admission Diagnoses: spinal stenosis, spondylolisthesis    Discharge Diagnoses: Acute and Chronic problems addressed during this hospital admission  Principal Problem:    Lumbar stenosis with neurogenic claudication (10/16/2013)    Active Problems:    GERD (gastroesophageal reflux disease) ()      Overview: managed with medication       Benign hypertrophy of prostate ()      Overview: managed with medication       Essential hypertension ()      COPD (chronic obstructive pulmonary disease) ([facility] [address] ()      Overview: Spirometry 11/11/15--mild obstruction      CAD (coronary artery disease) (9/16/2015)      Overview: 9/16/15 (Dr Benjamin Wayne) Coronary artery bypass grafting x3       S/P CABG (coronary artery bypass graft) (9/16/2015)      Overview:  LIMA TO LAD, SVG TO OM, SVG TO DISTAL RCA. DR. Brannon Davies, 9/2015        Surgeon: Amilcar Gil MD    Procedure: Procedure(s):  REVISION L3 L4 L5 S1 LAMINECTOMY AND FUSION TLIF AND INSTRUMENTATION    INDICATIONS FOR ADMISSION/PROCEDURE:  The patient has had low back pain with radiation to the buttocks and lower extremities for an extended period of time. The symptoms and exam findings were felt to be consistent with neurogenic claudication. The preoperative radiographs and MRI/CT showed adjacent level spinal stenosis. Conservative measures have been exhausted. The symptoms progressed to the point where there is difficulty performing any task that requires prolonged standing or walking. In the outpatient setting the risks, benefits and potential complications of the above-listed procedure were discussed with her and an informed consent was obtained. HBG at Discharge: No results for input(s): HGB, HGBEXT in the last 72 hours.     Hospital Course: Patient admitted to ortho floor. Antibiotics were given postop. SCD and arline hose were in place for DVT prophylaxis. Reynolds catheter was in place until POD#1 then discontinued. Patient did not receive blood transfusion. The patient tolerated pain medications and po diet. The hemovac drain output gradually diminished and was then removed. Dressing remained clean, dry, and intact. Physical Therapy started on the day following surgery and progressed to ambulation without assistance. At the time of discharge, the patient had understanding of precautions needed following surgery. Postoperative complications requiring extended length of stay: None    Discharged to: Home    New Medications: Norco    Discharge instructions:  -Resume pre hospital diet            -Resume home medications per medical continuation form     -Follow up in office as scheduled   -Call doctor immediately if T>100.5, increased pain, swelling, drainage.   -If shortness of breath or chest pain, immediately go to ER  -Post surgical instruction sheet given to patient    Signed:  LUCAS Zhou  6/15/2017

## 2017-06-15 NOTE — PROGRESS NOTES
Problem: Mobility Impaired (Adult and Pediatric)  Goal: *Acute Goals and Plan of Care (Insert Text)  LTG:  (1.)Mr. Tracie Gonzalez will move from supine to sit and sit to supine, scoot up and down and roll side to side INDEPENDENTLY demonstrating log roll technique with bed flat within 7 day(s). (2.)Mr. Tracie Gonzalez will transfer from bed to chair and chair to bed INDEPENDENTLY using the least restrictive device within 7 day(s). (3.)Mr. Tracie Gonzalez will ambulate with MODIFIED INDEPENDENCE for 500+ feet with the least restrictive device within 7 day(s). (4.)Mr. Tracie Gonzalez will ascend and descend 2 steps with SUPERVISION within 7 days. (5.)Mr. Tracie Gonzalez will independently verbalize 3/3 post op spinal precautions within 7 days. ______________________________________________________________________________________________  PHYSICAL THERAPY: Daily Note, Treatment Day: 1st and AM 6/15/2017  INPATIENT: Hospital Day: 3  Payor: Misty Varner. / Plan: [place] [employer] Jorgejamila [employer]con[employer]. / Product Type: Commerical /      NAME/AGE/GENDER: Kaci Padron is a 78 y.o. male           PRIMARY DIAGNOSIS: Lumbar spinal stenosis [M48.06] Lumbar stenosis with neurogenic claudication Lumbar stenosis with neurogenic claudication  Procedure(s) (LRB):  REVISION L3 L4 L5 S1 LAMINECTOMY AND FUSION TLIF AND INSTRUMENTATION (N/A)  2 Days Post-Op  ICD-10: Treatment Diagnosis:       · Difficulty in walking, Not elsewhere classified (R26.2)  · Low Back Pain (M54.5)   Precaution/Allergies:  Codeine       ASSESSMENT:      Mr. Tracie Gonzalez was supine upon contact and agreeable to PT. Patient able to perform bed mobility with mod I and transfer to standing with supervision. Patient ambulates in hallway x 450' without assistive device demonstrating occasional trunk sway but no LOB noted. Patient returns to B where he was left sitting up with wife at bedside. Patient able to recall 3/3 spinal precautions. Encouraged patient to ambulate in the hallways with family.  Overall good progress towards physical therapy goals as noted by improved activity tolerance and reduced assistance needed for bed mobility/transfers. Will continue efforts. No goals have been met thus far. This section established at most recent assessment   PROBLEM LIST (Impairments causing functional limitations):  1. Decreased Strength  2. Decreased Transfer Abilities  3. Decreased Ambulation Ability/Technique  4. Decreased Balance  5. Increased Pain  6. Decreased Activity Tolerance  7. Decreased Knowledge of Precautions    INTERVENTIONS PLANNED: (Benefits and precautions of physical therapy have been discussed with the patient.)  1. Balance Exercise  2. Bed Mobility  3. Gait Training  4. Therapeutic Activites  5. Therapeutic Exercise/Strengthening  6. Transfer Training  7. Group Therapy      TREATMENT PLAN: Frequency/Duration: twice daily for duration of hospital stay  Rehabilitation Potential For Stated Goals: GOOD      RECOMMENDED REHABILITATION/EQUIPMENT: (at time of discharge pending progress): Continue Skilled Therapy and discharge needs TBD pending progress;  PT vs none? [place] Setting HISTORY:   History of Present Injury/Illness (Reason for Referral):  Per H&P, \"The patient returns today with persistent symptoms of lumbar claudication affecting the right side with functional deficit as previously described. The symptoms are predominately in the right buttock, lateral and posterior thigh, and lateral and posterior shin. The symptoms have been present for 9 months. The symptoms are described as a deep ache, spasm, and numbness. The symptoms are worse with activities on his feet including walking, standing. Any activities requiring walking or standing are quite limited. There has been no lasting benefit from conservative efforts. The best relief he had was 3 weeks of benefit (70%) after a recent epidural steroid injection.  At this point he is ready to proceed with surgical intervention\"     Past Medical History/Comorbidities:   Mr. Jerry Combs  has a past medical history of Abnormal cardiovascular function study (1/26/2016); Adenoma of left adrenal gland; Arthritis; BPH (benign prostatic hyperplasia); Chest discomfort (1/26/2016); Chronic low back pain; COPD (chronic obstructive pulmonary disease) ([facility] [address] Coronary artery disease; ED (erectile dysfunction); GERD (gastroesophageal reflux disease); History of basal cell cancer; Hypertension; Mixed hyperlipidemia; Nausea & vomiting; Palpitations; Pleural effusion exudative (9/30/15); and Prostatic hypertrophy, benign (1/26/2016). He also has no past medical history of Unspecified sleep apnea. Mr. Jerry Combs  has a past surgical history that includes colonoscopy (08/2011); refractive surgery; heent (Bilateral, 8/2003); mohs procedure (Left, 2008); cardiac surg procedure unlist (9/2015); other surgical; total hip arthroplasty (Left, 3/2014); lumbar fusion (2013); and abdomen surgery proc unlisted (Bilateral). Social History/Living Environment:   Home Environment: Private residence  # Steps to Enter: 2  Wheelchair Ramp: No  One/Two Story Residence: One story  Living Alone: No  Support Systems: Spouse/Significant Other/Partner, Child(omar)  Patient Expects to be Discharged to[de-identified] Private residence  Current DME Used/Available at Home: [address] [facility], [place], straight, Walker, rolling  Tub or Shower Type: Tub/Shower combination  Prior Level of Function/Work/Activity:  Sania Tracey lives with wife in single story home with 2 steps to enter. He is ambulatory at baseline with cane or occasionally walker due to back and R LE pain. Denies falls. Was working but retired last week. Drives.        Number of Personal Factors/Comorbidities that affect the Plan of Care: 1-2: MODERATE COMPLEXITY   EXAMINATION:   Most Recent Physical Functioning:   Gross Assessment:                  Posture:     Balance:  Sitting: Intact  Standing: Impaired  Standing - Static: Good  Standing - Dynamic : Fair (+) Bed Mobility:  Rolling: Modified independent  Supine to Sit: Modified independent  Sit to Supine:  (NT)  Wheelchair Mobility:     Transfers:  Sit to Stand: Supervision  Stand to Sit: Supervision  Gait:     Base of Support: Narrowed  Speed/Anastasiya: Slow  Step Length: Right shortened;Left shortened  Distance (ft): 450 Feet (ft)  Assistive Device:  (hallway railing)  Ambulation - Level of Assistance: Supervision       Body Structures Involved:  1. Bones  2. Joints  3. Muscles Body Functions Affected:  1. Sensory/Pain  2. Movement Related  3. Skin Related Activities and Participation Affected:  1. General Tasks and Demands  2. Mobility  3. Self Care  4. Domestic Life  5. Community, Social and [place]   Number of elements that affect the Plan of Care: 4+: HIGH COMPLEXITY   CLINICAL PRESENTATION:   Presentation: Stable and uncomplicated: LOW COMPLEXITY   CLINICAL DECISION MAKIN [facility] Inpatient Short Form  How much difficulty does the patient currently have. .. Unable A Lot A Little None   1. Turning over in bed (including adjusting bedclothes, sheets and blankets)? [ ] 1   [ ] 2   [ ] 3   [X] 4   2. Sitting down on and standing up from a chair with arms ( e.g., wheelchair, bedside commode, etc.)   [ ] 1   [ ] 2   [ ] 3   [X] 4   3. Moving from lying on back to sitting on the side of the bed? [ ] 1   [ ] 2   [ ] 3   [X] 4   How much help from another person does the patient currently need. .. Total A Lot A Little None   4. Moving to and from a bed to a chair (including a wheelchair)? [ ] 1   [ ] 2   [X] 3   [ ] 4   5. Need to walk in hospital room? [ ] 1   [ ] 2   [X] 3   [ ] 4   6. Climbing 3-5 steps with a railing? [ ] 1   [ ] 2   [X] 3   [ ] 4   © , Trustees of [address] [place] [address] [address] [id number], under license to [employer].  All rights reserved    Score:  Initial: 21 Most Recent: X (Date: -- )     Interpretation of Tool:  Represents activities that are increasingly more difficult (i.e. Bed mobility, Transfers, Gait). Score 24 23 22-20 19-15 14-10 9-7 6       Modifier CH CI CJ CK CL CM CN         · Mobility - Walking and Moving Around:               - CURRENT STATUS:    CJ - 20%-39% impaired, limited or restricted               - GOAL STATUS:            - 0% impaired, limited or restricted               - D/C STATUS:                       ---------------To be determined---------------  Payor: [employer] / Plan: [place] [employer] / Product Type: Commerical /       Medical Necessity:     · Patient demonstrates good rehab potential due to higher previous functional level. Reason for Services/Other Comments:  · Patient continues to demonstrate capacity to improve strength, mobility, balance, transfers, activity tolerance which will increase independence and increase safety. Use of outcome tool(s) and clinical judgement create a POC that gives a: Clear prediction of patient's progress: LOW COMPLEXITY                 TREATMENT:   (In addition to Assessment/Re-Assessment sessions the following treatments were rendered)   Pre-treatment Symptoms/Complaints: None  Pain: Initial:   Pain Intensity 1: 0  Post Session:  0/10      Therapeutic Activity: (   15 Minutes ):  Therapeutic activities including Bed mobility training, transfer training, ambulation on level ground, static/dynamic standing balance activities, review of spinal precautions, and patient education to improve mobility, strength, balance, coordination and activity tolerance. Required minimal verbal, tactile and manual cues   to promote static and dynamic balance in standing, promote coordination of bilateral, lower extremity(s) and to promote improved ability to maintain spinal precautions through functional movements.             Braces/Orthotics/Lines/Etc:   · drain hemovac  Treatment/Session Assessment:    · Response to Treatment:  See above  · Interdisciplinary Collaboration:  · Physical Therapy Assistant and Registered Nurse  · After treatment position/precautions:  · Bed/Chair-wheels locked, Bed in low position, Call light within reach, RN notified, Family at bedside and seated EOB  · Compliance with Program/Exercises: Will assess as treatment progresses. · Recommendations/Intent for next treatment session: \"Next visit will focus on advancements to more challenging activities and reduction in assistance provided\".   Total Treatment Duration:  PT Patient Time In/Time Out  Time In: 0854  Time Out: 500 [address]

## 2018-01-05 ENCOUNTER — HOSPITAL ENCOUNTER (OUTPATIENT)
Dept: SURGERY | Age: 68
Discharge: HOME | End: 2018-01-05
Attending: ORTHOPAEDIC SURGERY
Payer: MEDICARE

## 2018-01-05 VITALS
BODY MASS INDEX: 30.01 KG/M2 | RESPIRATION RATE: 18 BRPM | OXYGEN SATURATION: 97 % | HEART RATE: 86 BPM | DIASTOLIC BLOOD PRESSURE: 76 MMHG | TEMPERATURE: 97 F | WEIGHT: 186.7 LBS | HEIGHT: 66 IN | SYSTOLIC BLOOD PRESSURE: 146 MMHG

## 2018-01-05 LAB
ANION GAP SERPL CALC-SCNC: 8 MMOL/L (ref 7–16)
APPEARANCE UR: CLEAR
APTT PPP: 27.9 SEC (ref 23.2–35.3)
BACTERIA SPEC CULT: NORMAL
BILIRUB UR QL: NEGATIVE
BUN SERPL-MCNC: 19 MG/DL (ref 8–23)
CALCIUM SERPL-MCNC: 10.1 MG/DL (ref 8.3–10.4)
CHLORIDE SERPL-SCNC: 102 MMOL/L (ref 98–107)
CO2 SERPL-SCNC: 28 MMOL/L (ref 21–32)
COLOR UR: YELLOW
CREAT SERPL-MCNC: 1.03 MG/DL (ref 0.8–1.5)
ERYTHROCYTE [DISTWIDTH] IN BLOOD BY AUTOMATED COUNT: 14.6 % (ref 11.9–14.6)
GLUCOSE SERPL-MCNC: 122 MG/DL (ref 65–100)
GLUCOSE UR STRIP.AUTO-MCNC: NEGATIVE MG/DL
HCT VFR BLD AUTO: 46.7 % (ref 41.1–50.3)
HGB BLD-MCNC: 15 G/DL (ref 13.6–17.2)
HGB UR QL STRIP: NEGATIVE
INR PPP: 0.9
KETONES UR QL STRIP.AUTO: NEGATIVE MG/DL
LEUKOCYTE ESTERASE UR QL STRIP.AUTO: NEGATIVE
MCH RBC QN AUTO: 29.7 PG (ref 26.1–32.9)
MCHC RBC AUTO-ENTMCNC: 32.1 G/DL (ref 31.4–35)
MCV RBC AUTO: 92.5 FL (ref 79.6–97.8)
NITRITE UR QL STRIP.AUTO: NEGATIVE
PH UR STRIP: 6 [PH] (ref 5–9)
PLATELET # BLD AUTO: 246 K/UL (ref 150–450)
PMV BLD AUTO: 10.4 FL (ref 10.8–14.1)
POTASSIUM SERPL-SCNC: 4.4 MMOL/L (ref 3.5–5.1)
PROT UR STRIP-MCNC: NEGATIVE MG/DL
PROTHROMBIN TIME: 12.8 SEC (ref 11.5–14.5)
RBC # BLD AUTO: 5.05 M/UL (ref 4.23–5.67)
SERVICE CMNT-IMP: NORMAL
SODIUM SERPL-SCNC: 138 MMOL/L (ref 136–145)
SP GR UR REFRACTOMETRY: 1.01 (ref 1–1.02)
UROBILINOGEN UR QL STRIP.AUTO: 0.2 EU/DL (ref 0.2–1)
WBC # BLD AUTO: 8.1 K/UL (ref 4.3–11.1)

## 2018-01-05 PROCEDURE — 85027 COMPLETE CBC AUTOMATED: CPT | Performed by: ORTHOPAEDIC SURGERY

## 2018-01-05 PROCEDURE — 87641 MR-STAPH DNA AMP PROBE: CPT | Performed by: ORTHOPAEDIC SURGERY

## 2018-01-05 PROCEDURE — 81003 URINALYSIS AUTO W/O SCOPE: CPT | Performed by: ORTHOPAEDIC SURGERY

## 2018-01-05 PROCEDURE — 77030027138 HC INCENT SPIROMETER -A

## 2018-01-05 PROCEDURE — 85610 PROTHROMBIN TIME: CPT | Performed by: ORTHOPAEDIC SURGERY

## 2018-01-05 PROCEDURE — 85730 THROMBOPLASTIN TIME PARTIAL: CPT | Performed by: ORTHOPAEDIC SURGERY

## 2018-01-05 PROCEDURE — 80048 BASIC METABOLIC PNL TOTAL CA: CPT | Performed by: ORTHOPAEDIC SURGERY

## 2018-01-05 RX ORDER — HYDROCODONE BITARTRATE AND ACETAMINOPHEN 7.5; 325 MG/1; MG/1
TABLET ORAL AS NEEDED
COMMUNITY
End: 2018-01-13

## 2018-01-05 NOTE — PERIOP NOTES
Recent Results (from the past 12 hour(s))   CBC W/O DIFF    Collection Time: 01/05/18  9:32 AM   Result Value Ref Range    WBC 8.1 4.3 - 11.1 K/uL    RBC 5.05 4.23 - 5.67 M/uL    HGB 15.0 13.6 - 17.2 g/dL    HCT 46.7 41.1 - 50.3 %    MCV 92.5 79.6 - 97.8 FL    MCH 29.7 26.1 - 32.9 PG    MCHC 32.1 31.4 - 35.0 g/dL    RDW 14.6 11.9 - 14.6 %    PLATELET 157 707 - 735 K/uL    MPV 10.4 (L) 10.8 - 05.4 FL   METABOLIC PANEL, BASIC    Collection Time: 01/05/18  9:32 AM   Result Value Ref Range    Sodium 138 136 - 145 mmol/L    Potassium 4.4 3.5 - 5.1 mmol/L    Chloride 102 98 - 107 mmol/L    CO2 28 21 - 32 mmol/L    Anion gap 8 7 - 16 mmol/L    Glucose 122 (H) 65 - 100 mg/dL    BUN 19 8 - 23 MG/DL    Creatinine 1.03 0.8 - 1.5 MG/DL    GFR est AA >60 >60 ml/min/1.73m2    GFR est non-AA >60 >60 ml/min/1.73m2    Calcium 10.1 8.3 - 10.4 MG/DL   PROTHROMBIN TIME + INR    Collection Time: 01/05/18  9:32 AM   Result Value Ref Range    Prothrombin time 12.8 11.5 - 14.5 sec    INR 0.9     PTT    Collection Time: 01/05/18  9:32 AM   Result Value Ref Range    aPTT 27.9 23.2 - 35.3 SEC   URINALYSIS W/ RFLX MICROSCOPIC    Collection Time: 01/05/18  9:32 AM   Result Value Ref Range    Color YELLOW      Appearance CLEAR      Specific gravity 1.013 1.001 - 1.023      pH (UA) 6.0 5.0 - 9.0      Protein NEGATIVE  NEG mg/dL    Glucose NEGATIVE  mg/dL    Ketone NEGATIVE  NEG mg/dL    Bilirubin NEGATIVE  NEG      Blood NEGATIVE  NEG      Urobilinogen 0.2 0.2 - 1.0 EU/dL    Nitrites NEGATIVE  NEG      Leukocyte Esterase NEGATIVE  NEG

## 2018-01-05 NOTE — PERIOP NOTES
Patient verified name, , and surgery as listed in [facility]. Type 3 surgery, Joint assessment complete. Pt with hx of lumbar spine surgery with hardware: X-rays dated 17 noted in EMR. Labs per surgeon: cbc,bmp,ua ; results within limits; PT,PTT, MSSA pending. EKG: dated 11-10-17- within limits. Echo dated 10-24-16: within limits. Both printed from EMR and placed in chart for reference along with OV note dated 11-10-17 and cardiac clearance telephone note dated 17. Hibiclens and instructions to return bottle on DOS given per hospital policy. Nasal Swab collected per MD order and instructions for Mupirocin nasal ointment if required. Patient provided with handouts including Guide to Surgery, Pain Management, Hand Hygiene, Blood Transfusion Education, and [place] [facility] Brochure. Patient answered medical/surgical history questions at their best of ability. All prior to admission medications documented in [facility]. Original medication prescription bottle  visualized during patient appointment. Patient instructed to hold all vitamins 7 days prior to surgery and NSAIDS 5 days prior to surgery. Medications to be held: none. Patient instructed to continue previous medications as prescribed prior to surgery and to take the following medications the day of surgery according to anesthesia guidelines with a small sip of water: omeprazole, metoprolol, crestor, aspirin 81 mg, tramadol or norco if needed. Patient teach back successful and patient demonstrates knowledge of instruction.

## 2018-01-08 PROBLEM — Z91.14 NONCOMPLIANCE WITH CPAP TREATMENT: Status: ACTIVE | Noted: 2018-01-08

## 2018-01-08 NOTE — ADVANCED PRACTICE NURSE
Total Joint Surgery Preoperative Chart Review      Patient ID:  Amanda Sierra  [id number]  80 y.o.  1950  Surgeon: Dr. Jayme Bose  Date of Surgery: 1/12/2018  Procedure: Total Right Hip Arthroplasty  Primary Care Physician: Roddy Bedoya -[phone]  Specialty Physician(s):      Subjective:   Amanda Sierra is a 78 y.o. WHITE OR  male who presents for preoperative evaluation for Total Right Hip arthroplasty. This is a preoperative chart review note based on data collected by the nurse at the surgical Pre-Assessment visit.     Past Medical History:   Diagnosis Date    Abnormal cardiovascular function study 1/26/2016    Adenoma of left adrenal gland     Arthritis     general    BPH (benign prostatic hyperplasia)     Chest discomfort 1/26/2016    Chronic kidney disease     Chronic low back pain     Chronic pain     back, hip    COPD (chronic obstructive pulmonary disease) (HCC)     mild per pt; no rescue inhaler; denies SOB with 1 flight of steps    Coronary artery disease     had CABG 9/14/15 s/p failed stress test    ED (erectile dysfunction)     GERD (gastroesophageal reflux disease)     controlled with med    History of basal cell cancer     Hypertension     controlled with med    Mixed hyperlipidemia     Murmur, cardiac     soft 1/6 MR murmur per cardiologist note 11-10-17 : per echo 10-24-16 \"mild mitral regurg\"    Nausea & vomiting     responds to IV antiemetic    Palpitations     Pleural effusion exudative 9/30/15    t-cent on left    Prostatic hypertrophy, benign 1/26/2016    Sleep apnea     pt reports mild- no c-pap      Past Surgical History:   Procedure Laterality Date    ABDOMEN SURGERY PROC UNLISTED Bilateral     [facility]    CARDIAC SURG PROCEDURE UNLIST  9/2015    CABG    HX COLONOSCOPY  08/2011    tubular adenoma    HX HEENT Bilateral 8/2003    Lasik    HX LUMBAR FUSION  2013    Laminectomy & fusion L4/L5    HX MOHS PROCEDURES Left 2008    HX ORTHOPAEDIC  06/13/2017 L3, L4, L5 and S1 fusion with instrumentation, Dr Ervin Schaefer      thoracentesis    HX REFRACTIVE SURGERY      bilateral     TOTAL HIP ARTHROPLASTY Left 3/2014     Family History   Problem Relation Age of Onset    Stroke Father     Hypertension Father     Heart Disease Father     Heart Disease Brother     Cancer Brother      lung    Heart Disease Mother     Heart Disease Brother     Heart Disease Sister     Lung Disease Sister     Lung Disease Brother     Cancer Sister      Pancrease      Social History   Substance Use Topics    Smoking status: Former Smoker     Packs/day: 2.00     Years: 30.00     Types: Cigarettes     Quit date: 2/1/1997    Smokeless tobacco: Never Used    Alcohol use 3.0 oz/week     6 Cans of beer per week      Comment: wkend       Prior to Admission medications    Medication Sig Start Date End Date Taking? Authorizing Provider   HYDROcodone-acetaminophen (NORCO) 7.5-325 mg per tablet Take  by mouth as needed for Pain. Yes Historical Provider   METOPROLOL SUCCINATE PO Take 50 mg by mouth daily. Indications: take 1/2 tab daily    Historical Provider   traMADol (ULTRAM) 50 mg tablet Take 50 mg by mouth every six (6) hours as needed for Pain. Historical Provider   rosuvastatin (CRESTOR) 20 mg tablet Take 1 Tab by mouth every morning. Indications: hypercholesterolemia 7/26/17   Kevin Castrejon NP   omeprazole (PRILOSEC) 40 mg capsule Take 1 Cap by mouth every morning. Indications: GASTROESOPHAGEAL REFLUX 7/5/16   Kevin Castrejon NP   lisinopril (PRINIVIL, ZESTRIL) 10 mg tablet Take 1 Tab by mouth daily. Patient taking differently: Take 5 mg by mouth nightly. 7/5/16   Kevin Castrejon NP   tamsulosin (FLOMAX) 0.4 mg capsule Take 1 Cap by mouth nightly. Indications: BENIGN PROSTATIC HYPERPLASIA 5/31/16   Kevin Castrejon NP   aspirin delayed-release 81 mg tablet Take 81 mg by mouth every morning.     Historical Provider     Allergies   Allergen Reactions    Codeine Rash     Tolerates Percocet without side- effects          Objective:       ECG:    EKG Results     None          Data Review:   Labs:   No results found for this or any previous visit (from the past 24 hour(s)). Results for Olinda Wayne (MRN [id number]) as of 1/8/2018 17:54   Ref.  Range 1/5/2018 09:32   Sodium Latest Ref Range: 136 - 145 mmol/L 138   Potassium Latest Ref Range: 3.5 - 5.1 mmol/L 4.4   Chloride Latest Ref Range: 98 - 107 mmol/L 102   CO2 Latest Ref Range: 21 - 32 mmol/L 28   Anion gap Latest Ref Range: 7 - 16 mmol/L 8   Glucose Latest Ref Range: 65 - 100 mg/dL 122 (H)   BUN Latest Ref Range: 8 - 23 MG/DL 19   Creatinine Latest Ref Range: 0.8 - 1.5 MG/DL 1.03   Calcium Latest Ref Range: 8.3 - 10.4 MG/DL 10.1   GFR est non-AA Latest Ref Range: >60 ml/min/1.73m2 >60   GFR est AA Latest Ref Range: >60 ml/min/1.73m2 >60       Problem List:  )  Patient Active Problem List   Diagnosis Code    GERD (gastroesophageal reflux disease) K21.9    Benign hypertrophy of prostate N40.0    Lumbar stenosis with neurogenic claudication M48.062    S/P prosthetic total arthroplasty of the hip Z96.649    Inguinal hernia, left K40.90    Arthritis M19.90    Chronic pain G89.29    Claustrophobia F40.240    Diseases of tricuspid valve I07.9    Inguinal hernia without mention of obstruction or gangrene, unilateral or unspecified, (not specified as recurrent) K40.90    Essential hypertension I10    Impotence of organic origin N52.9    Primary localized osteoarthrosis, lower leg M17.10    Lumbago M54.5    Intermittent palpitations R00.2    COPD (chronic obstructive pulmonary disease) (HCC) J44.9    Mitral regurgitation I34.0    Mixed hyperlipidemia E78.2    CAD (coronary artery disease) I25.10    S/P CABG (coronary artery bypass graft) Z95.1    History of tobacco abuse Z87.891    Alpha-1-antitrypsin deficiency ([facility] [address] E88.01    ED (erectile dysfunction) N52.9    Abnormal cardiovascular function study R94.30    Prostatic hypertrophy, benign N40.0    Chest discomfort R07.89    Snoring R06.83    Noncompliance with CPAP treatment Z91.14       Total Joint Surgery Pre-Assessment Recommendations: The patient is not compliant with wearing CPAP. Recommend oxygen saturation monitoring during hospitalization and oxygen at 3 liter via nc qhs.             Signed By: Luh Feliz NP-C    January 8, 2018

## 2018-01-08 NOTE — PERIOP NOTES
1/5/2018  2:29 PM - George, Lab [facility]   Component Results   Component Value Flag Ref Range Units Status   Special Requests: NASAL     Final   Culture result:      Final   SA target not detected.                                 A MRSA NEGATIVE, SA NEGATIVE test result does not preclude MRSA or SA nasal colonization.

## 2018-01-09 NOTE — H&P
[id number] [facility]  Pre Operative History and Physical Exam    Patient ID:  Maximiliano Vallejo  [id number]  34 y.o.  1950    Today: January 9, 2018       Assessment:   1. Arthritis of the right hip        Plan:    1. Proceed with scheduled Procedure(s) (LRB):  RIGHT HIP ARTHROPLASTY TOTAL / DEPUY (Right)            CC:  Right hip pain    HPI:   The patient has end stage arthritis of the right hip. The patient was evaluated and examined during a consultation prior to this office visit. There have been no changes to the patient's orthopedic condition since the initial consultation. The patient has failed previous conservative treatment for this condition including antiinflammatories , and lifestyle modifications. The necessity for joint replacement is present.  The patient will be admitted the day of surgery for Procedure(s) (LRB):  RIGHT HIP ARTHROPLASTY TOTAL / Tanya Nunn (Right)      Past Medical/Surgical History:  Past Medical History:   Diagnosis Date    Abnormal cardiovascular function study 1/26/2016    Adenoma of left adrenal gland     Arthritis     general    BPH (benign prostatic hyperplasia)     Chest discomfort 1/26/2016    Chronic kidney disease     Chronic low back pain     Chronic pain     back, hip    COPD (chronic obstructive pulmonary disease) (HCC)     mild per pt; no rescue inhaler; denies SOB with 1 flight of steps    Coronary artery disease     had CABG 9/14/15 s/p failed stress test    ED (erectile dysfunction)     GERD (gastroesophageal reflux disease)     controlled with med    History of basal cell cancer     Hypertension     controlled with med    Mixed hyperlipidemia     Murmur, cardiac     soft 1/6 MR murmur per cardiologist note 11-10-17 : per echo 10-24-16 \"mild mitral regurg\"    Nausea & vomiting     responds to IV antiemetic    Palpitations     Pleural effusion exudative 9/30/15    t-cent on left    Prostatic hypertrophy, benign 1/26/2016    Sleep apnea     pt reports mild- no c-pap     Past Surgical History:   Procedure Laterality Date    ABDOMEN SURGERY PROC UNLISTED [facility]ilateral     [facility]    CARDIAC SURG PROCEDURE UNLIST  9/2015    CABG    HX COLONOSCOPY  08/2011    tubular adenoma    HX HEENT Bilateral 8/2003    Lasik    HX LUMBAR FUSION  2013    Laminectomy & fusion L4/L5    HX MOHS PROCEDURES Left 2008    HX ORTHOPAEDIC  06/13/2017    L3, L4, L5 and S1 fusion with instrumentation, Dr Carlos Hands      thoracentesis    HX REFRACTIVE SURGERY      bilateral     TOTAL HIP ARTHROPLASTY Left 3/2014        Allergies: Allergies   Allergen Reactions    Codeine Rash     Tolerates Percocet without side- effects        Objective:                    HEENT: NC/AT                   Lungs:  Unlabored respirations, clear breath sounds                    Heart:   RRR                    Abdomen: soft                   Extremities:  Pain with ROM of the right hip    Meds:   No current facility-administered medications for this encounter. Current Outpatient Prescriptions   Medication Sig    HYDROcodone-acetaminophen (NORCO) 7.5-325 mg per tablet Take  by mouth as needed for Pain.  METOPROLOL SUCCINATE PO Take 50 mg by mouth daily. Indications: take 1/2 tab daily    traMADol (ULTRAM) 50 mg tablet Take 50 mg by mouth every six (6) hours as needed for Pain.  rosuvastatin (CRESTOR) 20 mg tablet Take 1 Tab by mouth every morning. Indications: hypercholesterolemia    omeprazole (PRILOSEC) 40 mg capsule Take 1 Cap by mouth every morning. Indications: GASTROESOPHAGEAL REFLUX    lisinopril (PRINIVIL, ZESTRIL) 10 mg tablet Take 1 Tab by mouth daily. (Patient taking differently: Take 5 mg by mouth nightly.)    tamsulosin (FLOMAX) 0.4 mg capsule Take 1 Cap by mouth nightly. Indications: BENIGN PROSTATIC HYPERPLASIA    aspirin delayed-release 81 mg tablet Take 81 mg by mouth every morning.          Labs:  Hospital Outpatient Visit on 01/05/2018   Component Date Value Ref Range Status    WBC 01/05/2018 8.1  4.3 - 11.1 K/uL Final    RBC 01/05/2018 5.05  4.23 - 5.67 M/uL Final    HGB 01/05/2018 15.0  13.6 - 17.2 g/dL Final    HCT 01/05/2018 46.7  41.1 - 50.3 % Final    MCV 01/05/2018 92.5  79.6 - 97.8 FL Final    MCH 01/05/2018 29.7  26.1 - 32.9 PG Final    MCHC 01/05/2018 32.1  31.4 - 35.0 g/dL Final    RDW 01/05/2018 14.6  11.9 - 14.6 % Final    PLATELET 99/38/8655 540  150 - 450 K/uL Final    MPV 01/05/2018 10.4* 10.8 - 14.1 FL Final    Sodium 01/05/2018 138  136 - 145 mmol/L Final    Potassium 01/05/2018 4.4  3.5 - 5.1 mmol/L Final    Chloride 01/05/2018 102  98 - 107 mmol/L Final    CO2 01/05/2018 28  21 - 32 mmol/L Final    Anion gap 01/05/2018 8  7 - 16 mmol/L Final    Glucose 01/05/2018 122* 65 - 100 mg/dL Final    Comment: 47 - 60 mg/dl Consistent with, but not fully diagnostic of hypoglycemia. 101 - 125 mg/dl Impaired fasting glucose/consistent with pre-diabetes mellitus  > 126 mg/dl Fasting glucose consistent with overt diabetes mellitus      BUN 01/05/2018 19  8 - 23 MG/DL Final    Creatinine 01/05/2018 1.03  0.8 - 1.5 MG/DL Final    GFR est AA 01/05/2018 >60  >60 ml/min/1.73m2 Final    GFR est non-AA 01/05/2018 >60  >60 ml/min/1.73m2 Final    Comment: (NOTE)  Estimated GFR is calculated using the Modification of Diet in Renal   Disease (MDRD) Study equation, reported for both  Americans   (GFRAA) and non- Americans (GFRNA), and normalized to 1.73m2   body surface area. The physician must decide which value applies to   the patient. The MDRD study equation should only be used in   individuals age 25 or older. It has not been validated for the   following: pregnant women, patients with serious comorbid conditions,   or on certain medications, or persons with extremes of body size,   muscle mass, or nutritional status.       Calcium 01/05/2018 10.1  8.3 - 10.4 MG/DL Final    Prothrombin time 01/05/2018 12.8  11.5 - 14.5 sec Final    ** Note new reference range and method **    INR 01/05/2018 0.9    Final    Comment: Suggested therapeutic INR range:  Venous thrombosis and embolus  2.0-3.0  Prosthetic heart valve         2.5-3.5  ** Note new reference range and method **      aPTT 01/05/2018 27.9  23.2 - 35.3 SEC Final    Comment: Heparin Therapeutic Range = 74 - 123 seconds  In addition to factor deficiency, monitoring heparin therapy, etc., evaluation of a prolonged aPTT result should include consideration of preanalytic variables such as heparin flush contamination, specimen integrity issues, etc.  ** Note new reference range and method **      Color 01/05/2018 YELLOW    Final    Appearance 01/05/2018 CLEAR    Final    Specific gravity 01/05/2018 1.013  1.001 - 1.023   Final    pH (UA) 01/05/2018 6.0  5.0 - 9.0   Final    Protein 01/05/2018 NEGATIVE   NEG mg/dL Final    Glucose 01/05/2018 NEGATIVE   mg/dL Final    Ketone 01/05/2018 NEGATIVE   NEG mg/dL Final    Bilirubin 01/05/2018 NEGATIVE   NEG   Final    Blood 01/05/2018 NEGATIVE   NEG   Final    Urobilinogen 01/05/2018 0.2  0.2 - 1.0 EU/dL Final    Nitrites 01/05/2018 NEGATIVE   NEG   Final    Leukocyte Esterase 01/05/2018 NEGATIVE   NEG   Final    Special Requests: 01/05/2018 NASAL    Final    Culture result: 01/05/2018 SA target not detected. A MRSA NEGATIVE, SA NEGATIVE test result does not preclude MRSA or SA nasal colonization.     Final   Office Visit on 11/02/2017   Component Date Value Ref Range Status    WBC 11/02/2017 7.0  3.4 - 10.8 x10E3/uL Final    RBC 11/02/2017 5.02  4.14 - 5.80 x10E6/uL Final    HGB 11/02/2017 13.9  12.6 - 17.7 g/dL Final    HCT 11/02/2017 41.8  37.5 - 51.0 % Final    MCV 11/02/2017 83  79 - 97 fL Final    MCH 11/02/2017 27.7  26.6 - 33.0 pg Final    MCHC 11/02/2017 33.3  31.5 - 35.7 g/dL Final    RDW 11/02/2017 17.4* 12.3 - 15.4 % Final    PLATELET 85/95/0689 196  150 - 379 x10E3/uL Final    NEUTROPHILS 11/02/2017 62  Not Estab. % Final    Lymphocytes 11/02/2017 25  Not Estab. % Final    MONOCYTES 11/02/2017 11  Not Estab. % Final    EOSINOPHILS 11/02/2017 2  Not Estab. % Final    BASOPHILS 11/02/2017 0  Not Estab. % Final    ABS. NEUTROPHILS 11/02/2017 4.3  1.4 - 7.0 x10E3/uL Final    Abs Lymphocytes 11/02/2017 1.7  0.7 - 3.1 x10E3/uL Final    ABS. MONOCYTES 11/02/2017 0.8  0.1 - 0.9 x10E3/uL Final    ABS. EOSINOPHILS 11/02/2017 0.1  0.0 - 0.4 x10E3/uL Final    ABS. BASOPHILS 11/02/2017 0.0  0.0 - 0.2 x10E3/uL Final    IMMATURE GRANULOCYTES 11/02/2017 0  Not Estab. % Final    ABS. IMM. GRANS. 11/02/2017 0.0  0.0 - 0.1 x10E3/uL Final    TSH 11/02/2017 2.310  0.450 - 4.500 uIU/mL Final    T4, Free 11/02/2017 1.15  0.82 - 1.77 ng/dL Final   Lab Only on 10/25/2017   Component Date Value Ref Range Status    Glucose 10/25/2017 97  65 - 99 mg/dL Final    BUN 10/25/2017 13  8 - 27 mg/dL Final    Creatinine 10/25/2017 1.06  0.76 - 1.27 mg/dL Final    GFR est non-AA 10/25/2017 73  >59 mL/min/1.73 Final    GFR est AA 10/25/2017 84  >59 mL/min/1.73 Final    BUN/Creatinine ratio 10/25/2017 12  10 - 24 Final    Sodium 10/25/2017 140  134 - 144 mmol/L Final    Potassium 10/25/2017 4.5  3.5 - 5.2 mmol/L Final    Chloride 10/25/2017 101  96 - 106 mmol/L Final    CO2 10/25/2017 21  18 - 29 mmol/L Final    Calcium 10/25/2017 9.8  8.6 - 10.2 mg/dL Final    Protein, total 10/25/2017 7.5  6.0 - 8.5 g/dL Final    Albumin 10/25/2017 4.3  3.6 - 4.8 g/dL Final    GLOBULIN, TOTAL 10/25/2017 3.2  1.5 - 4.5 g/dL Final    A-G Ratio 10/25/2017 1.3  1.2 - 2.2 Final    Bilirubin, total 10/25/2017 0.3  0.0 - 1.2 mg/dL Final    Alk.  phosphatase 10/25/2017 117  39 - 117 IU/L Final    AST (SGOT) 10/25/2017 28  0 - 40 IU/L Final    ALT (SGPT) 10/25/2017 23  0 - 44 IU/L Final    Cholesterol, total 10/25/2017 142  100 - 199 mg/dL Final    Triglyceride 10/25/2017 133  0 - 149 mg/dL Final    HDL Cholesterol 10/25/2017 47  >39 mg/dL Final    VLDL, calculated 10/25/2017 27  5 - 40 mg/dL Final    LDL, calculated 10/25/2017 68  0 - 99 mg/dL Final                 Patient Active Problem List   Diagnosis Code    GERD (gastroesophageal reflux disease) K21.9    Benign hypertrophy of prostate N40.0    Lumbar stenosis with neurogenic claudication M48.062    S/P prosthetic total arthroplasty of the hip Z96.649    Inguinal hernia, left K40.90    Arthritis M19.90    Chronic pain G89.29    Claustrophobia F40.240    Diseases of tricuspid valve I07.9    Inguinal hernia without mention of obstruction or gangrene, unilateral or unspecified, (not specified as recurrent) K40.90    Essential hypertension I10    Impotence of organic origin N52.9    Primary localized osteoarthrosis, lower leg M17.10    Lumbago M54.5    Intermittent palpitations R00.2    COPD (chronic obstructive pulmonary disease) ([facility]) J44.9    Mitral regurgitation I34.0    Mixed hyperlipidemia E78.2    CAD (coronary artery disease) I25.10    S/P CABG (coronary artery bypass graft) Z95.1    History of tobacco abuse Z87.891    Alpha-1-antitrypsin deficiency ([facility]) E88.01    ED (erectile dysfunction) N52.9    Abnormal cardiovascular function study R94.30    Prostatic hypertrophy, benign N40.0    Chest discomfort R07.89    Snoring R06.83    Noncompliance with CPAP treatment Z91.14         Signed By: LUCAS Woodruff  January 9, 2018

## 2018-01-11 ENCOUNTER — ANESTHESIA EVENT (OUTPATIENT)
Dept: SURGERY | Age: 68
DRG: 470 | End: 2018-01-11
Payer: MEDICARE

## 2018-01-12 ENCOUNTER — ANESTHESIA (OUTPATIENT)
Dept: SURGERY | Age: 68
DRG: 470 | End: 2018-01-12
Payer: MEDICARE

## 2018-01-12 ENCOUNTER — APPOINTMENT (OUTPATIENT)
Dept: GENERAL RADIOLOGY | Age: 68
DRG: 470 | End: 2018-01-12
Payer: MEDICARE

## 2018-01-12 ENCOUNTER — HOSPITAL ENCOUNTER (INPATIENT)
Age: 68
LOS: 1 days | Discharge: HOME HEALTH | DRG: 470 | End: 2018-01-13
Attending: ORTHOPAEDIC SURGERY | Admitting: ORTHOPAEDIC SURGERY
Payer: MEDICARE

## 2018-01-12 DIAGNOSIS — Z96.641 H/O TOTAL HIP ARTHROPLASTY, RIGHT: ICD-10-CM

## 2018-01-12 DIAGNOSIS — Z96.641 STATUS POST RIGHT HIP REPLACEMENT: Primary | ICD-10-CM

## 2018-01-12 LAB
ABO + RH BLD: NORMAL
BLOOD GROUP ANTIBODIES SERPL: NORMAL
GLUCOSE BLD STRIP.AUTO-MCNC: 90 MG/DL (ref 65–100)
HGB BLD-MCNC: 13 G/DL (ref 13.6–17.2)
SPECIMEN EXP DATE BLD: NORMAL

## 2018-01-12 PROCEDURE — 0SR904A REPLACEMENT OF RIGHT HIP JOINT WITH CERAMIC ON POLYETHYLENE SYNTHETIC SUBSTITUTE, UNCEMENTED, OPEN APPROACH: ICD-10-PCS | Performed by: ORTHOPAEDIC SURGERY

## 2018-01-12 PROCEDURE — 77030011640 HC PAD GRND REM COVD -A: Performed by: ORTHOPAEDIC SURGERY

## 2018-01-12 PROCEDURE — 88305 TISSUE EXAM BY PATHOLOGIST: CPT | Performed by: ORTHOPAEDIC SURGERY

## 2018-01-12 PROCEDURE — 74011250636 HC RX REV CODE- 250/636: Performed by: ANESTHESIOLOGY

## 2018-01-12 PROCEDURE — C1776 JOINT DEVICE (IMPLANTABLE): HCPCS | Performed by: ORTHOPAEDIC SURGERY

## 2018-01-12 PROCEDURE — 77030006777 HC BLD SAW OSC CNMD -B: Performed by: ORTHOPAEDIC SURGERY

## 2018-01-12 PROCEDURE — 94760 N-INVAS EAR/PLS OXIMETRY 1: CPT

## 2018-01-12 PROCEDURE — 72170 X-RAY EXAM OF PELVIS: CPT

## 2018-01-12 PROCEDURE — 82962 GLUCOSE BLOOD TEST: CPT

## 2018-01-12 PROCEDURE — 77030008467 HC STPLR SKN COVD -B: Performed by: ORTHOPAEDIC SURGERY

## 2018-01-12 PROCEDURE — 77030018547 HC SUT ETHBND1 J&J -B: Performed by: ORTHOPAEDIC SURGERY

## 2018-01-12 PROCEDURE — 74011000250 HC RX REV CODE- 250

## 2018-01-12 PROCEDURE — 77030031139 HC SUT VCRL2 J&J -A: Performed by: ORTHOPAEDIC SURGERY

## 2018-01-12 PROCEDURE — 94762 N-INVAS EAR/PLS OXIMTRY CONT: CPT

## 2018-01-12 PROCEDURE — 74011000302 HC RX REV CODE- 302: Performed by: ORTHOPAEDIC SURGERY

## 2018-01-12 PROCEDURE — 86900 BLOOD TYPING SEROLOGIC ABO: CPT | Performed by: ORTHOPAEDIC SURGERY

## 2018-01-12 PROCEDURE — 74011250636 HC RX REV CODE- 250/636: Performed by: ORTHOPAEDIC SURGERY

## 2018-01-12 PROCEDURE — 77030008459 HC STPLR SKN COOP -B: Performed by: ORTHOPAEDIC SURGERY

## 2018-01-12 PROCEDURE — 77030034849: Performed by: ORTHOPAEDIC SURGERY

## 2018-01-12 PROCEDURE — 74011000250 HC RX REV CODE- 250: Performed by: ORTHOPAEDIC SURGERY

## 2018-01-12 PROCEDURE — 77030003665 HC NDL SPN BBMI -A: Performed by: ANESTHESIOLOGY

## 2018-01-12 PROCEDURE — 76210000016 HC OR PH I REC 1 TO 1.5 HR: Performed by: ORTHOPAEDIC SURGERY

## 2018-01-12 PROCEDURE — 88311 DECALCIFY TISSUE: CPT | Performed by: ORTHOPAEDIC SURGERY

## 2018-01-12 PROCEDURE — 74011250636 HC RX REV CODE- 250/636: Performed by: PHYSICIAN ASSISTANT

## 2018-01-12 PROCEDURE — 86580 TB INTRADERMAL TEST: CPT | Performed by: ORTHOPAEDIC SURGERY

## 2018-01-12 PROCEDURE — 85018 HEMOGLOBIN: CPT | Performed by: PHYSICIAN ASSISTANT

## 2018-01-12 PROCEDURE — 99221 1ST HOSP IP/OBS SF/LOW 40: CPT | Performed by: PHYSICAL MEDICINE & REHABILITATION

## 2018-01-12 PROCEDURE — 77030002966 HC SUT PDS J&J -A: Performed by: ORTHOPAEDIC SURGERY

## 2018-01-12 PROCEDURE — 77030007880 HC KT SPN EPDRL BBMI -B: Performed by: ANESTHESIOLOGY

## 2018-01-12 PROCEDURE — 77030019557 HC ELECTRD VES SEAL MEDT -F: Performed by: ORTHOPAEDIC SURGERY

## 2018-01-12 PROCEDURE — 77030020782 HC GWN BAIR PAWS FLX 3M -B: Performed by: ANESTHESIOLOGY

## 2018-01-12 PROCEDURE — 74011250636 HC RX REV CODE- 250/636

## 2018-01-12 PROCEDURE — 76060000034 HC ANESTHESIA 1.5 TO 2 HR: Performed by: ORTHOPAEDIC SURGERY

## 2018-01-12 PROCEDURE — 77030019940 HC BLNKT HYPOTHRM STRY -B: Performed by: ANESTHESIOLOGY

## 2018-01-12 PROCEDURE — 77030013727 HC IRR FAN PULSVC ZIMM -B: Performed by: ORTHOPAEDIC SURGERY

## 2018-01-12 PROCEDURE — 77030018836 HC SOL IRR NACL ICUM -A: Performed by: ORTHOPAEDIC SURGERY

## 2018-01-12 PROCEDURE — 74011000250 HC RX REV CODE- 250: Performed by: ANESTHESIOLOGY

## 2018-01-12 PROCEDURE — 76010000162 HC OR TIME 1.5 TO 2 HR INTENSV-TIER 1: Performed by: ORTHOPAEDIC SURGERY

## 2018-01-12 PROCEDURE — 74011000258 HC RX REV CODE- 258: Performed by: ORTHOPAEDIC SURGERY

## 2018-01-12 PROCEDURE — 65270000029 HC RM PRIVATE

## 2018-01-12 PROCEDURE — 74011250637 HC RX REV CODE- 250/637: Performed by: ANESTHESIOLOGY

## 2018-01-12 PROCEDURE — 36415 COLL VENOUS BLD VENIPUNCTURE: CPT | Performed by: PHYSICIAN ASSISTANT

## 2018-01-12 PROCEDURE — 74011250637 HC RX REV CODE- 250/637: Performed by: PHYSICIAN ASSISTANT

## 2018-01-12 PROCEDURE — 77030012935 HC DRSG AQUACEL BMS -B: Performed by: ORTHOPAEDIC SURGERY

## 2018-01-12 DEVICE — PINNACLE HIP SOLUTIONS ALTRX POLYETHYLENE ACETABULAR LINER NEUTRAL 36MM ID 54MM OD
Type: IMPLANTABLE DEVICE | Site: HIP | Status: FUNCTIONAL
Brand: PINNACLE ALTRX

## 2018-01-12 DEVICE — APEX HOLE ELIMINATOR - PS
Type: IMPLANTABLE DEVICE | Site: HIP | Status: FUNCTIONAL
Brand: APEX

## 2018-01-12 DEVICE — SUMMIT FEMORAL STEM 12/14 TAPER TAPER ED W/POROCOAT SIZE 6 HI 150MM
Type: IMPLANTABLE DEVICE | Site: HIP | Status: FUNCTIONAL
Brand: SUMMIT POROCOAT

## 2018-01-12 DEVICE — BIOLOX DELTA CERAMIC FEMORAL HEAD +5.0 36MM DIA 12/14 TAPER
Type: IMPLANTABLE DEVICE | Site: HIP | Status: FUNCTIONAL
Brand: BIOLOX DELTA

## 2018-01-12 DEVICE — PINNACLE 100 ACETABULAR SHELL GRIPTION 100 54MM OD
Type: IMPLANTABLE DEVICE | Site: HIP | Status: FUNCTIONAL
Brand: PINNACLE GRIPTION

## 2018-01-12 RX ORDER — PROPOFOL 10 MG/ML
INJECTION, EMULSION INTRAVENOUS
Status: DISCONTINUED | OUTPATIENT
Start: 2018-01-12 | End: 2018-01-12 | Stop reason: HOSPADM

## 2018-01-12 RX ORDER — FENTANYL CITRATE 50 UG/ML
INJECTION, SOLUTION INTRAMUSCULAR; INTRAVENOUS AS NEEDED
Status: DISCONTINUED | OUTPATIENT
Start: 2018-01-12 | End: 2018-01-12 | Stop reason: HOSPADM

## 2018-01-12 RX ORDER — ASPIRIN 325 MG
325 TABLET ORAL 2 TIMES DAILY
Qty: 60 TAB | Refills: 1 | Status: SHIPPED | OUTPATIENT
Start: 2018-01-12 | End: 2018-02-16

## 2018-01-12 RX ORDER — CEFAZOLIN SODIUM/WATER 2 G/20 ML
2 SYRINGE (ML) INTRAVENOUS EVERY 8 HOURS
Status: COMPLETED | OUTPATIENT
Start: 2018-01-12 | End: 2018-01-13

## 2018-01-12 RX ORDER — AMOXICILLIN 250 MG
2 CAPSULE ORAL DAILY
Status: DISCONTINUED | OUTPATIENT
Start: 2018-01-13 | End: 2018-01-13 | Stop reason: HOSPADM

## 2018-01-12 RX ORDER — CELECOXIB 200 MG/1
200 CAPSULE ORAL EVERY 12 HOURS
Status: DISCONTINUED | OUTPATIENT
Start: 2018-01-12 | End: 2018-01-13 | Stop reason: HOSPADM

## 2018-01-12 RX ORDER — HYDROCODONE BITARTRATE AND ACETAMINOPHEN 7.5; 325 MG/1; MG/1
1 TABLET ORAL AS NEEDED
Status: DISCONTINUED | OUTPATIENT
Start: 2018-01-12 | End: 2018-01-12 | Stop reason: HOSPADM

## 2018-01-12 RX ORDER — HYDROMORPHONE HYDROCHLORIDE 2 MG/1
2 TABLET ORAL
Qty: 40 TAB | Refills: 0 | Status: SHIPPED | OUTPATIENT
Start: 2018-01-12 | End: 2018-02-08

## 2018-01-12 RX ORDER — DEXAMETHASONE SODIUM PHOSPHATE 100 MG/10ML
10 INJECTION INTRAMUSCULAR; INTRAVENOUS ONCE
Status: DISCONTINUED | OUTPATIENT
Start: 2018-01-13 | End: 2018-01-13 | Stop reason: HOSPADM

## 2018-01-12 RX ORDER — MIDAZOLAM HYDROCHLORIDE 1 MG/ML
2 INJECTION, SOLUTION INTRAMUSCULAR; INTRAVENOUS
Status: DISCONTINUED | OUTPATIENT
Start: 2018-01-12 | End: 2018-01-12 | Stop reason: HOSPADM

## 2018-01-12 RX ORDER — NALOXONE HYDROCHLORIDE 0.4 MG/ML
0.1 INJECTION, SOLUTION INTRAMUSCULAR; INTRAVENOUS; SUBCUTANEOUS AS NEEDED
Status: DISCONTINUED | OUTPATIENT
Start: 2018-01-12 | End: 2018-01-12 | Stop reason: HOSPADM

## 2018-01-12 RX ORDER — OXYCODONE HYDROCHLORIDE 5 MG/1
5 TABLET ORAL
Status: DISCONTINUED | OUTPATIENT
Start: 2018-01-12 | End: 2018-01-12 | Stop reason: HOSPADM

## 2018-01-12 RX ORDER — SODIUM CHLORIDE, SODIUM LACTATE, POTASSIUM CHLORIDE, CALCIUM CHLORIDE 600; 310; 30; 20 MG/100ML; MG/100ML; MG/100ML; MG/100ML
INJECTION, SOLUTION INTRAVENOUS
Status: DISCONTINUED | OUTPATIENT
Start: 2018-01-12 | End: 2018-01-12 | Stop reason: HOSPADM

## 2018-01-12 RX ORDER — ONDANSETRON 2 MG/ML
4 INJECTION INTRAMUSCULAR; INTRAVENOUS
Status: DISCONTINUED | OUTPATIENT
Start: 2018-01-12 | End: 2018-01-13 | Stop reason: HOSPADM

## 2018-01-12 RX ORDER — TRANEXAMIC ACID 100 MG/ML
INJECTION, SOLUTION INTRAVENOUS AS NEEDED
Status: DISCONTINUED | OUTPATIENT
Start: 2018-01-12 | End: 2018-01-12 | Stop reason: HOSPADM

## 2018-01-12 RX ORDER — CEFAZOLIN SODIUM/WATER 2 G/20 ML
2 SYRINGE (ML) INTRAVENOUS ONCE
Status: COMPLETED | OUTPATIENT
Start: 2018-01-12 | End: 2018-01-12

## 2018-01-12 RX ORDER — MIDAZOLAM HYDROCHLORIDE 1 MG/ML
INJECTION, SOLUTION INTRAMUSCULAR; INTRAVENOUS AS NEEDED
Status: DISCONTINUED | OUTPATIENT
Start: 2018-01-12 | End: 2018-01-12 | Stop reason: HOSPADM

## 2018-01-12 RX ORDER — LIDOCAINE HYDROCHLORIDE 10 MG/ML
0.1 INJECTION INFILTRATION; PERINEURAL AS NEEDED
Status: DISCONTINUED | OUTPATIENT
Start: 2018-01-12 | End: 2018-01-12 | Stop reason: HOSPADM

## 2018-01-12 RX ORDER — SODIUM CHLORIDE 9 MG/ML
100 INJECTION, SOLUTION INTRAVENOUS CONTINUOUS
Status: DISCONTINUED | OUTPATIENT
Start: 2018-01-12 | End: 2018-01-13 | Stop reason: HOSPADM

## 2018-01-12 RX ORDER — TAMSULOSIN HYDROCHLORIDE 0.4 MG/1
0.4 CAPSULE ORAL
Status: DISCONTINUED | OUTPATIENT
Start: 2018-01-12 | End: 2018-01-13 | Stop reason: HOSPADM

## 2018-01-12 RX ORDER — SODIUM CHLORIDE, SODIUM LACTATE, POTASSIUM CHLORIDE, CALCIUM CHLORIDE 600; 310; 30; 20 MG/100ML; MG/100ML; MG/100ML; MG/100ML
100 INJECTION, SOLUTION INTRAVENOUS CONTINUOUS
Status: DISCONTINUED | OUTPATIENT
Start: 2018-01-12 | End: 2018-01-12 | Stop reason: HOSPADM

## 2018-01-12 RX ORDER — BUPIVACAINE HYDROCHLORIDE 7.5 MG/ML
INJECTION, SOLUTION INTRASPINAL AS NEEDED
Status: DISCONTINUED | OUTPATIENT
Start: 2018-01-12 | End: 2018-01-12 | Stop reason: HOSPADM

## 2018-01-12 RX ORDER — HYDROMORPHONE HYDROCHLORIDE 2 MG/ML
1 INJECTION, SOLUTION INTRAMUSCULAR; INTRAVENOUS; SUBCUTANEOUS
Status: DISCONTINUED | OUTPATIENT
Start: 2018-01-12 | End: 2018-01-13 | Stop reason: HOSPADM

## 2018-01-12 RX ORDER — HYDROMORPHONE HYDROCHLORIDE 2 MG/1
2 TABLET ORAL
Status: DISCONTINUED | OUTPATIENT
Start: 2018-01-12 | End: 2018-01-13 | Stop reason: HOSPADM

## 2018-01-12 RX ORDER — FAMOTIDINE 20 MG/1
20 TABLET, FILM COATED ORAL ONCE
Status: DISCONTINUED | OUTPATIENT
Start: 2018-01-12 | End: 2018-01-12 | Stop reason: HOSPADM

## 2018-01-12 RX ORDER — SODIUM CHLORIDE 0.9 % (FLUSH) 0.9 %
5-10 SYRINGE (ML) INJECTION EVERY 8 HOURS
Status: DISCONTINUED | OUTPATIENT
Start: 2018-01-12 | End: 2018-01-12 | Stop reason: HOSPADM

## 2018-01-12 RX ORDER — ROPIVACAINE HYDROCHLORIDE 5 MG/ML
INJECTION, SOLUTION EPIDURAL; INFILTRATION; PERINEURAL AS NEEDED
Status: DISCONTINUED | OUTPATIENT
Start: 2018-01-12 | End: 2018-01-12 | Stop reason: HOSPADM

## 2018-01-12 RX ORDER — NALOXONE HYDROCHLORIDE 0.4 MG/ML
.2-.4 INJECTION, SOLUTION INTRAMUSCULAR; INTRAVENOUS; SUBCUTANEOUS
Status: DISCONTINUED | OUTPATIENT
Start: 2018-01-12 | End: 2018-01-13 | Stop reason: HOSPADM

## 2018-01-12 RX ORDER — ACETAMINOPHEN 10 MG/ML
1000 INJECTION, SOLUTION INTRAVENOUS ONCE
Status: COMPLETED | OUTPATIENT
Start: 2018-01-12 | End: 2018-01-12

## 2018-01-12 RX ORDER — ONDANSETRON 2 MG/ML
INJECTION INTRAMUSCULAR; INTRAVENOUS AS NEEDED
Status: DISCONTINUED | OUTPATIENT
Start: 2018-01-12 | End: 2018-01-12 | Stop reason: HOSPADM

## 2018-01-12 RX ORDER — PANTOPRAZOLE SODIUM 40 MG/1
40 TABLET, DELAYED RELEASE ORAL
Status: DISCONTINUED | OUTPATIENT
Start: 2018-01-13 | End: 2018-01-13 | Stop reason: HOSPADM

## 2018-01-12 RX ORDER — DEXAMETHASONE SODIUM PHOSPHATE 4 MG/ML
INJECTION, SOLUTION INTRA-ARTICULAR; INTRALESIONAL; INTRAMUSCULAR; INTRAVENOUS; SOFT TISSUE AS NEEDED
Status: DISCONTINUED | OUTPATIENT
Start: 2018-01-12 | End: 2018-01-12 | Stop reason: HOSPADM

## 2018-01-12 RX ORDER — KETOROLAC TROMETHAMINE 30 MG/ML
INJECTION, SOLUTION INTRAMUSCULAR; INTRAVENOUS AS NEEDED
Status: DISCONTINUED | OUTPATIENT
Start: 2018-01-12 | End: 2018-01-12 | Stop reason: HOSPADM

## 2018-01-12 RX ORDER — HYDROMORPHONE HYDROCHLORIDE 2 MG/ML
0.5 INJECTION, SOLUTION INTRAMUSCULAR; INTRAVENOUS; SUBCUTANEOUS
Status: DISCONTINUED | OUTPATIENT
Start: 2018-01-12 | End: 2018-01-12 | Stop reason: HOSPADM

## 2018-01-12 RX ORDER — MORPHINE SULFATE 10 MG/ML
INJECTION, SOLUTION INTRAMUSCULAR; INTRAVENOUS AS NEEDED
Status: DISCONTINUED | OUTPATIENT
Start: 2018-01-12 | End: 2018-01-12 | Stop reason: HOSPADM

## 2018-01-12 RX ORDER — LISINOPRIL 5 MG/1
5 TABLET ORAL
Status: DISCONTINUED | OUTPATIENT
Start: 2018-01-12 | End: 2018-01-13 | Stop reason: HOSPADM

## 2018-01-12 RX ORDER — FENTANYL CITRATE 50 UG/ML
100 INJECTION, SOLUTION INTRAMUSCULAR; INTRAVENOUS ONCE
Status: DISCONTINUED | OUTPATIENT
Start: 2018-01-12 | End: 2018-01-12 | Stop reason: HOSPADM

## 2018-01-12 RX ORDER — ACETAMINOPHEN 500 MG
1000 TABLET ORAL EVERY 6 HOURS
Status: DISCONTINUED | OUTPATIENT
Start: 2018-01-13 | End: 2018-01-13 | Stop reason: HOSPADM

## 2018-01-12 RX ORDER — SODIUM CHLORIDE 0.9 % (FLUSH) 0.9 %
5-10 SYRINGE (ML) INJECTION AS NEEDED
Status: DISCONTINUED | OUTPATIENT
Start: 2018-01-12 | End: 2018-01-12 | Stop reason: HOSPADM

## 2018-01-12 RX ORDER — EPHEDRINE SULFATE 50 MG/ML
INJECTION, SOLUTION INTRAVENOUS AS NEEDED
Status: DISCONTINUED | OUTPATIENT
Start: 2018-01-12 | End: 2018-01-12 | Stop reason: HOSPADM

## 2018-01-12 RX ORDER — SODIUM CHLORIDE 0.9 % (FLUSH) 0.9 %
5-10 SYRINGE (ML) INJECTION AS NEEDED
Status: DISCONTINUED | OUTPATIENT
Start: 2018-01-12 | End: 2018-01-13 | Stop reason: HOSPADM

## 2018-01-12 RX ORDER — ASPIRIN 325 MG
325 TABLET, DELAYED RELEASE (ENTERIC COATED) ORAL EVERY 12 HOURS
Status: DISCONTINUED | OUTPATIENT
Start: 2018-01-12 | End: 2018-01-13 | Stop reason: HOSPADM

## 2018-01-12 RX ORDER — SODIUM CHLORIDE 9 MG/ML
25 INJECTION, SOLUTION INTRAVENOUS CONTINUOUS
Status: DISCONTINUED | OUTPATIENT
Start: 2018-01-12 | End: 2018-01-12 | Stop reason: HOSPADM

## 2018-01-12 RX ORDER — DIPHENHYDRAMINE HCL 25 MG
25 CAPSULE ORAL
Status: DISCONTINUED | OUTPATIENT
Start: 2018-01-12 | End: 2018-01-13 | Stop reason: HOSPADM

## 2018-01-12 RX ORDER — SODIUM CHLORIDE 0.9 % (FLUSH) 0.9 %
5-10 SYRINGE (ML) INJECTION EVERY 8 HOURS
Status: DISCONTINUED | OUTPATIENT
Start: 2018-01-12 | End: 2018-01-13 | Stop reason: HOSPADM

## 2018-01-12 RX ADMIN — MIDAZOLAM HYDROCHLORIDE 2 MG: 1 INJECTION, SOLUTION INTRAMUSCULAR; INTRAVENOUS at 12:27

## 2018-01-12 RX ADMIN — TAMSULOSIN HYDROCHLORIDE 0.4 MG: 0.4 CAPSULE ORAL at 22:57

## 2018-01-12 RX ADMIN — TRANEXAMIC ACID 1000 MG: 100 INJECTION, SOLUTION INTRAVENOUS at 13:41

## 2018-01-12 RX ADMIN — SODIUM CHLORIDE, SODIUM LACTATE, POTASSIUM CHLORIDE, CALCIUM CHLORIDE: 600; 310; 30; 20 INJECTION, SOLUTION INTRAVENOUS at 13:11

## 2018-01-12 RX ADMIN — FENTANYL CITRATE 50 MCG: 50 INJECTION, SOLUTION INTRAMUSCULAR; INTRAVENOUS at 13:20

## 2018-01-12 RX ADMIN — BUPIVACAINE HYDROCHLORIDE 1.8 ML: 7.5 INJECTION, SOLUTION INTRASPINAL at 13:18

## 2018-01-12 RX ADMIN — TUBERCULIN PURIFIED PROTEIN DERIVATIVE 5 UNITS: 5 INJECTION, SOLUTION INTRADERMAL at 11:25

## 2018-01-12 RX ADMIN — SODIUM CHLORIDE, SODIUM LACTATE, POTASSIUM CHLORIDE, CALCIUM CHLORIDE: 600; 310; 30; 20 INJECTION, SOLUTION INTRAVENOUS at 13:30

## 2018-01-12 RX ADMIN — ONDANSETRON 4 MG: 2 INJECTION INTRAMUSCULAR; INTRAVENOUS at 13:38

## 2018-01-12 RX ADMIN — HYDROMORPHONE HYDROCHLORIDE 2 MG: 2 TABLET ORAL at 18:34

## 2018-01-12 RX ADMIN — PROPOFOL 25 MCG/KG/MIN: 10 INJECTION, EMULSION INTRAVENOUS at 13:25

## 2018-01-12 RX ADMIN — DIPHENHYDRAMINE HYDROCHLORIDE 25 MG: 25 CAPSULE ORAL at 22:58

## 2018-01-12 RX ADMIN — ASPIRIN 325 MG: 325 TABLET, DELAYED RELEASE ORAL at 22:57

## 2018-01-12 RX ADMIN — ACETAMINOPHEN 1000 MG: 10 INJECTION, SOLUTION INTRAVENOUS at 17:48

## 2018-01-12 RX ADMIN — ACETAMINOPHEN 1000 MG: 500 TABLET, FILM COATED ORAL at 22:58

## 2018-01-12 RX ADMIN — DEXAMETHASONE SODIUM PHOSPHATE 10 MG: 4 INJECTION, SOLUTION INTRA-ARTICULAR; INTRALESIONAL; INTRAMUSCULAR; INTRAVENOUS; SOFT TISSUE at 13:38

## 2018-01-12 RX ADMIN — CELECOXIB 200 MG: 200 CAPSULE ORAL at 22:57

## 2018-01-12 RX ADMIN — EPHEDRINE SULFATE 5 MG: 50 INJECTION, SOLUTION INTRAVENOUS at 13:37

## 2018-01-12 RX ADMIN — FENTANYL CITRATE 25 MCG: 50 INJECTION, SOLUTION INTRAMUSCULAR; INTRAVENOUS at 14:32

## 2018-01-12 RX ADMIN — Medication 2 G: at 22:00

## 2018-01-12 RX ADMIN — SODIUM CHLORIDE 100 ML/HR: 900 INJECTION, SOLUTION INTRAVENOUS at 16:10

## 2018-01-12 RX ADMIN — HYDROMORPHONE HYDROCHLORIDE 2 MG: 2 TABLET ORAL at 22:58

## 2018-01-12 RX ADMIN — LIDOCAINE HYDROCHLORIDE 0.1 ML: 10 INJECTION, SOLUTION INFILTRATION; PERINEURAL at 11:25

## 2018-01-12 RX ADMIN — Medication 2 G: at 13:11

## 2018-01-12 RX ADMIN — FENTANYL CITRATE 25 MCG: 50 INJECTION, SOLUTION INTRAMUSCULAR; INTRAVENOUS at 14:11

## 2018-01-12 RX ADMIN — LISINOPRIL 5 MG: 5 TABLET ORAL at 22:57

## 2018-01-12 RX ADMIN — SODIUM CHLORIDE, SODIUM LACTATE, POTASSIUM CHLORIDE, AND CALCIUM CHLORIDE 100 ML/HR: 600; 310; 30; 20 INJECTION, SOLUTION INTRAVENOUS at 11:25

## 2018-01-12 RX ADMIN — MIDAZOLAM HYDROCHLORIDE 2 MG: 1 INJECTION, SOLUTION INTRAMUSCULAR; INTRAVENOUS at 13:14

## 2018-01-12 NOTE — PERIOP NOTES
TRANSFER - OUT REPORT:    Verbal report given to Jose Manuel Munroe RN on Essie Velasquez  being transferred to [address] [place] [address] for routine post - op       Report consisted of patients Situation, Background, Assessment and   Recommendations(SBAR). Information from the following report(s) OR Summary, Procedure Summary, Intake/Output and MAR was reviewed with the receiving nurse. Opportunity for questions and clarification was provided. Patient transported room air.

## 2018-01-12 NOTE — CONSULTS
Physical Medicine & Rehabilitation Note-consult    Patient: Yesica Cardozo MRN: [id number]  SSN: [id number]    YOB: 1950  Age: 78 y.o. Sex: male      Admit Date: 1/12/2018  Admitting Physician: María Elena nEglish MD    Medical Decision Making/Plan/Recommend: Gait impairment and functional deficits following right total hip arthroplasty. PT/ OT to resume as tolerated. Focus on active/assisted/passive left TKA ROM, strengthening, mobility, transfers, gait training. Will follow progress. Plan for [facility] PT.      Chief Complaint : Gait dysfunction secondary to below. Admit Diagnosis: Hip pain, right [M25.551]; Aseptic necrosis of bone of right*  right total hip arthroplasty    1/12/2018  COPD (chronic obstructive pulmonary disease) ([facility])  Hypertension  Chronic low back pain  Pain  DVT risk  Post op hemorrhagic anemia  Acute Rehab Dx:  Gait impairment  Mobility and ambulation deficits  Self Care/ADL deficits    Medical Dx:  Past Medical History:   Diagnosis Date    Abnormal cardiovascular function study 1/26/2016    Adenoma of left adrenal gland     Arthritis     general    BPH (benign prostatic hyperplasia)     Chest discomfort 1/26/2016    Chronic kidney disease     Chronic low back pain     Chronic pain     back, hip    COPD (chronic obstructive pulmonary disease) ([facility])     mild per pt; no rescue inhaler; denies SOB with 1 flight of steps    Coronary artery disease     had CABG 9/14/15 s/p failed stress test    ED (erectile dysfunction)     GERD (gastroesophageal reflux disease)     controlled with med    History of basal cell cancer     Hypertension     controlled with med    Mixed hyperlipidemia     Murmur, cardiac     soft 1/6 MR murmur per cardiologist note 11-10-17 : per echo 10-24-16 \"mild mitral regurg\"    Nausea & vomiting     responds to IV antiemetic    Palpitations     Pleural effusion exudative 9/30/15    t-cent on left    Prostatic hypertrophy, benign 1/26/2016    Sleep apnea     pt reports mild- no c-pap    Status post right hip replacement 1/12/2018     Subjective:     Date of Evaluation:  January 12, 2018    HPI: Bowen Young is a 78 y.o. male patient at [address] [place] [address] who was admitted on 1/12/2018  by Cory Shultz MD with below mentioned medical history, is being seen for Physical Medicine and Rehabilitation consult. Bowen Young had severe right hip pain aggravated by weight bearing, walking and activity due to severe DJD. He is now s/p a right total hip arthroplasty per Dr. Cory Shultz MD on 1/12/2018. The patient's post operative course has been so far medically uncomplicated. Bowen Young is seen and examined today. Medical Records reviewed . We are consulted to assist with rehab needs and placement. Patient is to be WBAT RLE. Hip precautions to be followed for RLE; discussed. Patient is expected to have no unusual barreirs. Expect continued progress toward short term rehab goals. He denies any other functional debilities prior to this surgery. He has been independent with ambulation  prior to admission, limited by hip pain.       Current Level of Function:  bed mobility - min A, transfers - min A x2, decreased balance, ambulation - requires assist    Prior level of Function :  independnet    Family History   Problem Relation Age of Onset    Stroke Father     Hypertension Father     Heart Disease Father     Heart Disease Brother     Cancer Brother      lung    Heart Disease Mother     Heart Disease Brother     Heart Disease Sister     Lung Disease Sister     Lung Disease Brother     Cancer Sister      Pancrease      Social History   Substance Use Topics    Smoking status: Former Smoker     Packs/day: 2.00     Years: 30.00     Types: Cigarettes     Quit date: 2/1/1997    Smokeless tobacco: Never Used    Alcohol use 3.0 oz/week     6 Cans of beer per week      Comment: wkend     Past Surgical History:   Procedure Laterality Date    ABDOM[facility]ED [facility]     [facility]    CARDIAC SURG PROCEDURE UNLIST  9/2015    CABG    HX COLONOSCOPY  08/2011    tubular adenoma    HX HEENT Bilateral 8/2003    Lasik    HX LUMBAR FUSION  2013    Laminectomy & fusion L4/L5    HX MOHS PROCEDURES Left 2008    HX ORTHOPAEDIC  06/13/2017    L3, L4, L5 and S1 fusion with instrumentation, Dr Chan       thoracentesis    HX REFRACTIVE SURGERY      bilateral     TOTAL HIP ARTHROPLASTY Left 3/2014      Prior to Admission medications    Medication Sig Start Date End Date Taking? Authorizing Provider   aspirin (ASPIRIN) 325 mg tablet Take 1 Tab by mouth two (2) times a day for 35 days. 1/12/18 2/16/18 Yes LUCAS Bernal   HYDROmorphone (DILAUDID) 2 mg tablet Take 1 Tab by mouth every four (4) hours as needed for Pain. Max Daily Amount: 12 mg. 1/12/18  Yes LUCAS Bernal   METOPROLOL SUCCINATE PO Take 50 mg by mouth daily. Indications: take 1/2 tab daily   Yes Historical Provider   rosuvastatin (CRESTOR) 20 mg tablet Take 1 Tab by mouth every morning. Indications: hypercholesterolemia 7/26/17  Yes Dede Murry NP   omeprazole (PRILOSEC) 40 mg capsule Take 1 Cap by mouth every morning. Indications: GASTROESOPHAGEAL REFLUX 7/5/16  Yes Dede Murry NP   lisinopril (PRINIVIL, ZESTRIL) 10 mg tablet Take 1 Tab by mouth daily. Patient taking differently: Take 5 mg by mouth nightly. 7/5/16  Yes Dede Murry NP   tamsulosin (FLOMAX) 0.4 mg capsule Take 1 Cap by mouth nightly. Indications: BENIGN PROSTATIC HYPERPLASIA 5/31/16  Yes Dede Murry NP   aspirin delayed-release 81 mg tablet Take 81 mg by mouth every morning. Yes Historical Provider   HYDROcodone-acetaminophen (NORCO) 7.5-325 mg per tablet Take  by mouth as needed for Pain. Historical Provider   traMADol (ULTRAM) 50 mg tablet Take 50 mg by mouth every six (6) hours as needed for Pain.     Historical Provider     Allergies   Allergen Reactions    Codeine Rash     Tolerates Percocet without side- effects        Review of Systems: +right hip pain. Denies chest pain, shortness of breath, cough, headache, visual problems, abdominal pain, dysurea, calf pain. Pertinent positives are as noted in the medical records and unremarkable otherwise. Objective:     Vitals:  Blood pressure 115/67, pulse 81, temperature 98.1 °F (36.7 °C), resp. rate 16, height 5' 6\" (1.676 m), weight 186 lb (84.4 kg), SpO2 97 %. Temp (24hrs), Av.3 °F (36.3 °C), Min:96 °F (35.6 °C), Max:98.1 °F (36.7 °C)    BMI (calculated): 30 (18 1141)   Intake and Output:   0701 -  1900  In: 700 [I.V.:700]  Out: 478 [Urine:375]    Physical Exam:   General: Alert and age appropriately oriented. No acute cardio respiratory distress. HEENT: Normocephalic, no conjunctival pallor, no scleral icterus  Oral mucosa moist without cyanosis   Lungs: Clear to auscultation bilaterally. Respiration even and unlabored   Heart: Regular rate and rhythm, S1, S2   No  murmurs, clicks, rub or gallops   Abdomen: Soft, non-tender, non-distended. Genitourinary: defered   Neuromuscular:      Grossly no focal motor deficits. Right knee extension strong. Right ankle dorsiflexion 5/5  Right ankle plantarflexion 5/5  No sensory deficits distally BLE to soft touch. Skin/extremity: Calves non tender BLE. No rashes, no marginal erythema.                                                                                          Labs/Studies:  Recent Results (from the past 72 hour(s))   TYPE & SCREEN    Collection Time: 18 11:31 AM   Result Value Ref Range    Crossmatch Expiration 01/15/2018     ABO/Rh(D) A POSITIVE     Antibody screen NEG    GLUCOSE, POC    Collection Time: 18 11:35 AM   Result Value Ref Range    Glucose (POC) 90 65 - 100 mg/dL   HEMOGLOBIN    Collection Time: 18  7:17 PM   Result Value Ref Range    HGB 13.0 (L) 13.6 - 17.2 g/dL       Functional Assessment:  Reviewed participation and progress in therapies      Requires assist    Ambulation:  NT    Impression/Plan:     Principal Problem:    Status post right hip replacement (1/12/2018)        Current Facility-Administered Medications   Medication Dose Route Frequency Provider Last Rate Last Dose    tuberculin injection 5 Units  5 Units IntraDERMal ONCE Yecenia Napoles MD   5 Units at 01/12/18 1125    lisinopril (PRINIVIL, ZESTRIL) tablet 5 mg  5 mg Oral QHS Monica [place]        [START ON 1/13/2018] pantoprazole (PROTONIX) tablet 40 mg  40 mg Oral ACB Monica [place]        tamsulosin [facility]) capsule 0.4 mg  0.4 mg Oral QHS Monica [place]        0.9% sodium chloride infusion  100 mL/hr IntraVENous CONTINUOUS LUCAS Dudley 100 mL/hr at 01/12/18 1610 100 mL/hr at 01/12/18 1610    sodium chloride (NS) flush 5-10 mL  5-10 mL IntraVENous Q8H [facility]        sodium chloride (NS) flush 5-10 mL  5-10 mL IntraVENous PRN LUCAS Dduley        ceFAZolin (ANCEF) 2 g/20 mL in sterile water IV syringe  2 g IntraVENous Q8H [place]        [START ON 1/13/2018] acetaminophen (TYLENOL) tablet 1,000 mg  1,000 mg Oral Q6H [facility] [place] [facility]        celecoxib (CELEBREX) capsule 200 mg  200 mg Oral Q12H Monica [place]        HYDROmorphone (DILAUDID) tablet 2 mg  2 mg Oral Q4H PRN LUCAS Dudley   2 mg at 01/12/18 1834    HYDROmorphone (PF) (DILAUDID) injection 1 mg  1 mg IntraVENous Q3H PRN LUCAS Dudley        naloxone [facility]) injection 0.2-0.4 mg  0.2-0.4 mg IntraVENous Q10MIN PRN LUCAS Dudley        [START ON 1/13/2018] dexamethasone (DECADRON) injection 10 mg  10 mg IntraVENous ONCE [place]        ondansetron [facility]) injection 4 mg  4 mg IntraVENous Q4H PRN LUCAS Dudley        diphenhydrAMINE (BENADRYL) capsule 25 mg  25 mg Oral Q4H PRN LUCAS Dudley        [START ON 1/13/2018] senna-docusate ([place] [facility]) 8.6-50 mg per tablet 2 Tab  2 Tab Oral DAILY Dawna Silas [place]        aspirin delayed-release tablet 325 mg  325 mg Oral Q12H Dawna Silas [place]        [START ON 1/13/2018] PPD Check Skin Test Reminder Note x 3 Lucia  1 Each Other Q24H Carry Apolinar., MD        [START ON 1/13/2018] influenza vaccine 2017-18 (3 yrs+)(PF) (FLUZONE QUAD/FLUARIX QUAD) injection 0.5 mL  0.5 mL IntraMUSCular ONCE Carry MD Alondra            Recommendations: Plan home d/c with [facility] PT. Continue Acute Rehab Program. Continue gait training, transfer training, balance activities. Coordination of rehab/medical care. Counseling of Physical Medicine & Rehab care issues management. Rehabilitation Management/ Medical Management: 1. Devices:Walkers, Type: Rolling Walker  2. Consult:Rehab team including PT, OT,  and . 3. Disposition Rehab-discussed with patient. 4. Thigh-high or knee-high MIGUEL's when out of bed. 5. DVT Prophylaxis - start aspirin 325mg bid x 30days. 6. Incentive spirometer Q1H while awake  7. Post op hemorrhagic anemia- monitor. Will check in am.   8. Activity: WBAT RLE, total hip precautions. Discussed. 9. Planned Labs: CBC,BMP  10. Pain Control: fair control. Continue scheduled tylenol, celebrex and  PRN meds. Continue current management. 11. Wound Care: Keep hip  wound clean and dry and reinforce dressing PRN. May remove Aquacel 1 week post op ad replace with new one. Remove staples 12-14 post surgery, when incision appears appropriately closed and apply benzoin and 1/2\" steristrips. Follow up with Dr Geraldina Nissen  2 weeks after discharge from rehab. Follow up with ORTHO per instructions. Thank you for the opportunity to participate in the care of this patient.     Signed By: Myron Berry MD     January 12, 2018

## 2018-01-12 NOTE — PROGRESS NOTES
TRANSFER - IN REPORT:    Verbal report received from [address] [place] [address] Drive on [facility][note 3]  being received from PACU for routine post - op      Report consisted of patients Situation, Background, Assessment and   Recommendations(SBAR). Information from the following report(s) SBAR, Kardex, OR Summary, Procedure Summary, Intake/Output, MAR, Accordion and Recent Results was reviewed with the receiving nurse. Opportunity for questions and clarification was provided. Assessment completed upon patients arrival to unit and care assumed.

## 2018-01-12 NOTE — PERIOP NOTES
Pt wishes to only wear his rt hearing aid to the OR. His wife has his left hearing aid. Teach back method used in review of Incentive Spirometer, Hibiclens usage preop, TB screening and pain management goals. SUN wipe done to rt hip.

## 2018-01-12 NOTE — ANESTHESIA POSTPROCEDURE EVALUATION
Post-Anesthesia Evaluation and Assessment    Patient: Bowen Young MRN: [id number]  SSN: [id number]    YOB: 1950  Age: 78 y.o. Sex: male       Cardiovascular Function/Vital Signs  Visit Vitals    /57    Pulse 74    Temp 36.2 °C (97.1 °F)    Resp 16    Ht 5' 6\" (1.676 m)    Wt 84.4 kg (186 lb)    SpO2 100%    BMI 30.02 kg/m2       Patient is status post spinal anesthesia for Procedure(s):  RIGHT HIP ARTHROPLASTY TOTAL . Nausea/Vomiting: None    Postoperative hydration reviewed and adequate. Pain:  Pain Scale 1: Numeric (0 - 10) (01/12/18 1502)  Pain Intensity 1: 0 (01/12/18 1502)   Managed    Neurological Status:   Neuro (WDL): Exceptions to WDL (01/12/18 1502)  Neuro  Neurologic State: Drowsy (01/12/18 1502)  Orientation Level: Oriented X4 (01/12/18 1502)  LLE Motor Response: Pharmacologically paralyzed (01/12/18 1502)  RLE Motor Response: Pharmacologically paralyzed (01/12/18 1502)   At baseline    Mental Status and Level of Consciousness: Arousable    Pulmonary Status:   O2 Device: Nasal cannula (01/12/18 1502)   Adequate oxygenation and airway patent    Complications related to anesthesia: None    Post-anesthesia assessment completed.  No concerns    Signed By: Pedro Owusu MD     January 12, 2018

## 2018-01-12 NOTE — PROGRESS NOTES
Pre-op prayer request received. Prayer and support given to patient and wife. Rev.  [employer]

## 2018-01-12 NOTE — IP AVS SNAPSHOT
[address] [place] [address] 
[phone] Patient: Darling Barajas MRN: [id number] IEX:[id number]/5/[id number] About your hospitalization You were admitted on:  January 12, 2018 You last received care in the:  [facility] 1 You were discharged on:  January 13, 2018 Why you were hospitalized Your primary diagnosis was:  Status Post Right Hip Replacement Your diagnoses also included:  H/O Total Hip Arthroplasty, Right Follow-up Information Follow up With Details Comments Contact Info MD Yolande Norris [address] [facility] [address] 
[phone] Elmira Kathleen MD  Go to scheduled follow-up appointment. [address] [address] 
[phone] [address]  Will call you within 48 hours to schedule home visit. [address] [place] [address] 230 [facility] [address] 
[phone] Your Scheduled Appointments Thursday February 01, 2018  9:00 AM EST  
LAB with I[facility] LAB [facility] ([address] 2 [facility] 
[address] 187 [facility] [address]  
[phone] Thursday February 08, 2018  9:30 AM EST Office Visit with Jessica Flower NP [facility] ([address] 2 [facility] 
[address] 187 [facility] [id number] [phone] Discharge Orders Procedure Order Date Status Priority Quantity Spec Type Associated Dx CALL YOUR DOCTOR For: Temperature greater than 100.4., Severe uncontrolled pain. , Persistant nausea and vomiting., Persistant dizziness or light-headedness. , Hives, Difficulty breathing, headache, or visual disturbances. , Redness, tenderness, or s. .. 01/12/18 1352 Normal Routine 1  Status post right hip replacement [[id number]] Questions: For:  Temperature greater than 100.4. For:  Severe uncontrolled pain. For:  Persistant nausea and vomiting. For:  Persistant dizziness or light-headedness. For:  Kostas Men For:  Difficulty breathing, headache, or visual disturbances. For:  Redness, tenderness, or signs of infection. ACTIVITY AFTER DISCHARGE Patient should: Restrict driving, Restrict lifting, Other (specify) 01/12/18 1352 Normal Routine 1  Status post right hip replacement [[id number]] Questions: Patient should:  Restrict driving Patient should:  Restrict lifting Patient should: Other (specify) DRESSING, CHANGE SPECIFY 01/12/18 [facility] Normal Routine 1  Status post right hip replacement [[id number]] Comments:  Routine dressing changes. Notify if excessive drainage. If staples are present they are to be removed 10 days post surgery and steri strips applied. REFERRAL TO HOME HEALTH 01/12/18 1352 Normal Routine 1  Status post right hip replacement [[id number]] REFERRAL TO PHYSICAL THERAPY 01/12/18 1352 Normal Routine 1  Status post right hip replacement [[id number]] Comments:  Referral to Home PT A check karsten indicates which time of day the medication should be taken. My Medications START taking these medications Instructions Each Dose to Equal  
 Morning Noon Evening Bedtime  
 aspirin 325 mg tablet Commonly known as:  ASPIRIN Replaces:  aspirin delayed-release 81 mg tablet Your last dose was: Today at 900am  
Your next dose is: Tonight at 900pm  
   
 Take 1 Tab by mouth two (2) times a day for 35 days. 325 mg HYDROmorphone 2 mg tablet Commonly known as:  DILAUDID Take 1 Tab by mouth every four (4) hours as needed for Pain. Max Daily Amount: 12 mg.  
 2 mg CHANGE how you take these medications Instructions Each Dose to Equal  
 Morning Noon Evening Bedtime  
 lisinopril 10 mg tablet Commonly known as:  Nubia Verdin  
 What changed:   
- how much to take - when to take this Take 1 Tab by mouth daily. 10 mg CONTINUE taking these medications Instructions Each Dose to Equal  
 Morning Noon Evening Bedtime METOPROLOL SUCCINATE PO Take 50 mg by mouth daily. Indications: take 1/2 tab daily 50 mg  
    
   
   
   
  
 omeprazole 40 mg capsule Commonly known as:  PRILOSEC Take 1 Cap by mouth every morning. Indications: GASTROESOPHAGEAL REFLUX  
 40 mg  
    
   
   
   
  
 rosuvastatin 20 mg tablet Commonly known as:  CRESTOR Take 1 Tab by mouth every morning. Indications: hypercholesterolemia 20 mg  
    
   
   
   
  
 tamsulosin 0.4 mg capsule Commonly known as:  FLOMAX Take 1 Cap by mouth nightly. Indications: BENIGN PROSTATIC HYPERPLASIA  
 0.4 mg  
    
   
   
   
  
  
STOP taking these medications   
 aspirin delayed-release 81 mg tablet Replaced by:  aspirin 325 mg tablet NORCO 7.5-325 mg per tablet Generic drug:  HYDROcodone-acetaminophen  
   
  
 traMADol 50 mg tablet Commonly known as:  ULTRAM  
   
  
  
  
Where to Get Your Medications Information on where to get these meds will be given to you by the nurse or doctor. ! Ask your nurse or doctor about these medications  
  aspirin 325 mg tablet HYDROmorphone 2 mg tablet Discharge Instructions [facility] [employer] Patient Discharge Instructions Daniela Jacques / [id number] : 1950 Admitted 2018 Discharged: 2018 IF YOU HAVE ANY PROBLEMS ONCE YOU ARE AT HOME CALL THE FOLLOWING NUMBERS:  
Main office number: [phone] Medications · The medications you are to continue on are listed on the medication reconciliation sheet. · Narcotic pain medications as well as supplemental iron can cause constipation. If this occurs try stopping the narcotic pain medication and/or the iron. · It is important that you take the medication exactly as they are prescribed. · Medications which increase your risk of blood clots are listed to stop for 5 weeks after surgery as well as medications or supplements which increase your risk of bleeding complications. · Keep your medication in the bottles provided by the pharmacist and keep a list of the medication names, dosages, and times to be taken in your wallet. · Do not take other medications without consulting your doctor. Important Information Do NOT smoke as this will greatly increase your risk of infection! Resume your prehospital diet. If you have excessive nausea or vomitting call your doctor's office Leg swelling and warmth is normal for 6 months after surgery. If you experience swelling in your leg elevate you leg while laying down with your toes above your heart. If you have sudden onset severe swelling with leg pain call our office. Use Juno Hose stockings until we see you in the office for your follow up appointment. The stitches deep inside take approximately 6 months to dissolve. There will be sharp shooting, stinging and burning pain. This is normal and will resolve between 3-6 months after surgery. Difficulty sleeping is normal following total Knee and Hip replacement. You may try melatonin, an over-the-counter sleep aid or benadryl to help with sleep. Most patients will resume sleeping through the night 8 weeks after surgery. Home Physical Therapy is arranged. Home Health will contact you within 48 hrs of discharge that you have chosen. If you have not received a call within this time frame please contact that provider you chose. You should be given this information before you leave the hospital.  
 
You are at a risk for falls. Use the rolling walker when walking. Patients who have had a joint replacement should not drive if they are still taking narcotic pain mediation during the daytime hours.  Most patients wean themselves off of pain medication within 2-5 weeks after surgery. When to Call the office - If you have a temperature greater then 101 
- Uncontrolled vomiting - Loose control of your bladder or bowel function - Are unable to bear any wieght  
- Need a pain medication refill Information obtained by : 
I understand that if any problems occur once I am at home I am to contact my physician. I understand and acknowledge receipt of the instructions indicated above. Physician's or R.N.'s Signature                                                                  Date/Time Patient or Representative Signature                                                          Date/Time Hip Replacement Surgery (Posterior): What to Expect at [facility] Your Recovery Hip replacement surgery replaces the worn parts of your hip joint. When you leave the hospital, you will probably be walking with crutches or a walker. You may be able to climb a few stairs and get in and out of bed and chairs. But you will need someone to help you at home for the next few weeks or until you have more energy and can move around better. If there is no one to help you at home, you may go to a rehabilitation center or long-term care center. You will go home with a bandage and stitches or staples. You can remove the bandage when your doctor tells you to. Your doctor will remove your stitches or staples 10 days to 3 weeks after your surgery. You may still have some mild pain, and the area may be swollen for 3 to 4 months after surgery. Your doctor will give you medicine for the pain.  
You will continue the rehabilitation program (rehab) you started in the hospital. The better you do with your rehab exercises, the sooner you will get your strength and movement back. Most people are able to return to work 4 weeks to 4 months after surgery. This care sheet gives you a general idea about how long it will take for you to recover. But each person recovers at a different pace. Follow the steps below to get better as quickly as possible. How can you care for yourself at home? Activity ? · Your doctor may not want your affected leg to cross the center of your body toward the other leg. If so, your therapist may suggest these ideas: ¨ Do not cross your legs. ¨ Be very careful as you get in or out of bed or a car, so your leg does not cross that imaginary line in the middle of your body. ? · Rest when you feel tired. You may take a nap, but do not stay in bed all day. ? · Work with your physical therapist to learn the best way to exercise. You may be able to take frequent, short walks using crutches or a walker. You will probably have to use crutches or a walker for at least 4 to 6 weeks. ? · Your doctor may advise you to stay away from activities that put stress on the joint. This includes sports such as tennis, football, and jogging. ? · Try not to sit for too long at one time. You will feel less stiff if you take a short walk about every hour. When you sit, use chairs with arms, and do not sit in low chairs. ? · Do not bend over more than 90 degrees (like the angle in a letter \"L\"). ? · Sleep on your back with your legs slightly apart or on your side with a pillow between your knees for about 6 weeks or as your doctor tells you. Do not sleep on your stomach or affected leg. ? · You may need to take sponge baths until your stitches or staples have been removed. You will probably be able to shower 24 hours after they are removed. ? · Ask your doctor when you can drive again. ? · Most people are able to return to work 4 weeks to 4 months after surgery. ? · Ask your doctor when it is okay for you to have sex. Diet ? · By the time you leave the hospital, you will probably be eating your normal diet. If your stomach is upset, try bland, low-fat foods like plain rice, broiled chicken, toast, and yogurt. Your doctor may recommend that you take iron and vitamin supplements. ? · Drink plenty of fluids (unless your doctor tells you not to). ? · Eat healthy foods, and watch your portion sizes. Try to stay at your ideal weight. Too much weight puts more stress on your new hip joint. ? · You may notice that your bowel movements are not regular right after your surgery. This is common. Try to avoid constipation and straining with bowel movements. You may want to take a fiber supplement every day. If you have not had a bowel movement after a couple of days, ask your doctor about taking a mild laxative. Medicines ? · Your doctor will tell you if and when you can restart your medicines. He or she will also give you instructions about taking any new medicines. ? · If you take blood thinners, such as warfarin (Coumadin), clopidogrel (Plavix), or aspirin, be sure to talk to your doctor. He or she will tell you if and when to start taking those medicines again. Make sure that you understand exactly what your doctor wants you to do.  
? · Your doctor may give you a blood-thinning medicine to prevent blood clots. If you take a blood thinner, be sure you get instructions about how to take your medicine safely. Blood thinners can cause serious bleeding problems. This medicine could be in pill form or as a shot (injection). If a shot is necessary, your doctor will tell you how to do this. ? · Be safe with medicines. Take pain medicines exactly as directed. ¨ If the doctor gave you a prescription medicine for pain, take it as prescribed. ¨ If you are not taking a prescription pain medicine, ask your doctor if you can take an over-the-counter medicine. ? · If you think your pain medicine is making you sick to your stomach: 
¨ Take your medicine after meals (unless your doctor has told you not to). ¨ Ask your doctor for a different pain medicine. ? · If your doctor prescribed antibiotics, take them as directed. Do not stop taking them just because you feel better. You need to take the full course of antibiotics. Incision care ? · You will have a bandage over the cut (incision) and staples or stitches. Follow your doctor's instructions on when to take the bandage off. Giving the incision air will help it heal.  
? · Your doctor will remove the staples or stitches 10 days to 3 weeks after the surgery and replace them with strips of tape. Leave the strips on for a week or until they fall off. Exercise ? · Your rehab program will include a number of exercises to do. Always do them as your therapist tells you. Ice and elevation ? · For pain, put ice or a cold pack on the area for 10 to 20 minutes at a time. Put a thin cloth between the ice and your skin. ? · Your ankle may swell for about 3 months. Prop up your ankle when you ice it or anytime you sit or lie down. Try to keep it above the level of your heart. This will help reduce swelling. Other instructions ? Continue to wear your support stockings as your doctor says. These help to prevent blood clots. The length of time that you will have to wear them depends on your activity level and the amount of swelling you have. Most people wear these stockings for 4 to 6 weeks after surgery. ?Preventing falls is also very important. To prevent falls: 
? · Arrange furniture so that you will not trip on it. ? · Get rid of throw rugs, and move electrical cords out of the way. ? · Walk only in areas with plenty of light. ? · Put grab bars in showers and bathtubs. ? · Avoid icy or snowy sidewalks. ? · Wear shoes with sturdy, flat soles. Follow-up care is a key part of your treatment and safety. Be sure to make and go to all appointments, and call your doctor if you are having problems. It's also a good idea to know your test results and keep a list of the medicines you take. When should you call for help? Call 911 anytime you think you may need emergency care. For example, call if: 
? · You passed out (lost consciousness). ? · You have severe trouble breathing. ? · You have sudden chest pain and shortness of breath, or you cough up blood. ?Call your doctor now or seek immediate medical care if: 
? · You have signs that your hip may be dislocated, including: ¨ Severe pain and not being able to stand. ¨ A crooked leg that looks like your hip is out of position. ¨ Not being able to bend or straighten your leg. ? · Your leg or foot is cool or pale or changes color. ? · You cannot feel or move your leg. ? · You have signs of a blood clot, such as: 
¨ Pain in your calf, back of the knee, thigh, or groin. ¨ Redness and swelling in your leg or groin. ? · Your incision comes open and begins to bleed, or the bleeding increases. ? · You feel like your heart is racing or beating irregularly. ? · You have signs of infection, such as: 
¨ Increased pain, swelling, warmth, or redness. ¨ Red streaks leading from the incision. ¨ Pus draining from the incision. ¨ A fever. ? Watch closely for changes in your health, and be sure to contact your doctor if: 
? · You do not have a bowel movement after taking a laxative. ? · You do not get better as expected. Where can you learn more? Go to http://owen-darvin.info/. Enter A505 in the search box to learn more about \"Hip Replacement Surgery (Posterior): What to Expect at Home. \" Current as of: March 21, 2017 Content Version: 11.4 © 9636-5391 Healthwise[employer].  Care instructions adapted under license by [employer] (which disclaims liability or warranty for this information). If you have questions about a medical condition or this instruction, always ask your healthcare professional. [address] any warranty or liability for your use of this information. DISCHARGE SUMMARY from Nurse PATIENT INSTRUCTIONS: 
 
After general anesthesia or intravenous sedation, for 24 hours or while taking prescription Narcotics: · Limit your activities · Do not drive and operate hazardous machinery · Do not make important personal or business decisions · Do  not drink alcoholic beverages · If you have not urinated within 8 hours after discharge, please contact your surgeon on call. Report the following to your surgeon: 
· Excessive pain, swelling, redness or odor of or around the surgical area · Temperature over 100.5 · Nausea and vomiting lasting longer than 4 hours or if unable to take medications · Any signs of decreased circulation or nerve impairment to extremity: change in color, persistent  numbness, tingling, coldness or increase pain · Any questions These are general instructions for a healthy lifestyle: No smoking/ No tobacco products/ Avoid exposure to second hand smoke Surgeon General's Warning:  Quitting smoking now greatly reduces serious risk to your health. Obesity, smoking, and sedentary lifestyle greatly increases your risk for illness A healthy diet, regular physical exercise & weight monitoring are important for maintaining a healthy lifestyle You may be retaining fluid if you have a history of heart failure or if you experience any of the following symptoms:  Weight gain of 3 pounds or more overnight or 5 pounds in a week, increased swelling in our hands or feet or shortness of breath while lying flat in bed. Please call your doctor as soon as you notice any of these symptoms; do not wait until your next office visit. Recognize signs and symptoms of STROKE: 
 
F-face looks uneven A-arms unable to move or move unevenly S-speech slurred or non-existent T-time-call 911 as soon as signs and symptoms begin-DO NOT go Back to bed or wait to see if you get better-TIME IS BRAIN. Warning Signs of HEART ATTACK Call 911 if you have these symptoms: 
? Chest discomfort. Most heart attacks involve discomfort in the center of the chest that lasts more than a few minutes, or that goes away and comes back. It can feel like uncomfortable pressure, squeezing, fullness, or pain. ? Discomfort in other areas of the upper body. Symptoms can include pain or discomfort in one or both arms, the back, neck, jaw, or stomach. ? Shortness of breath with or without chest discomfort. ? Other signs may include breaking out in a cold sweat, nausea, or lightheadedness. Don't wait more than five minutes to call [address]! Fast action can save your life. Calling 911 is almost always the fastest way to get lifesaving treatment. Emergency Medical Services staff can begin treatment when they arrive  up to an hour sooner than if someone gets to the hospital by car. The discharge information has been reviewed with the patient. The patient verbalized understanding. Discharge medications reviewed with the patient and appropriate educational materials and side effects teaching were provided. ___________________________________________________________________________________________________________________________________ Introducing [facility] & HEALTH SERVICES! Dear Sabrina Huff: Thank you for requesting a [employer] account. Our records indicate that you already have an active [employer] account. You can access your account anytime at [email or web address]://[email or web address]. [employer] Did you know that you can access your hospital and ER discharge instructions at any time in [employer]? You can also review all of your test results from your hospital stay or ER visit. Additional Information If you have questions, please visit the Frequently Asked Questions section of the [employer] website at [email or web address] Remember, [employer] is NOT to be used for urgent needs. For medical emergencies, dial 911. Now available from your iPhone and Android! Providers Seen During Your Hospitalization Provider Specialty Primary office phone Erum Holt MD Orthopedic Surgery [phone] Immunizations Administered for This Admission Name Date Influenza Vaccine (Quad) PF 1/13/2018 TB Skin Test (PPD) Intradermal 1/12/2018 Your Primary Care Physician (PCP) Primary Care Physician Office Phone Office Fax [address] [facility] [address] [phone] You are allergic to the following Allergen Reactions Codeine Rash Tolerates Percocet without side- effects Recent Documentation Height Weight BMI Smoking Status 1.676 m 84.4 kg 30.02 kg/m2 Former Smoker Emergency Contacts Name Discharge Info Relation Home Work Mobile [facility] DISCHARGE CAREGIVER [3] Spouse [3] [phone] Patient Belongings The following personal items are in your possession at time of discharge: 
  Dental Appliances: Lowers, Uppers, With patient         Home Medications: None   Jewelry: Ring       Other Valuables: Wallet (and clothing with Jayashree) Please provide this summary of care documentation to your next provider. Signatures-by signing, you are acknowledging that this After Visit Summary has been reviewed with you and you have received a copy. Patient Signature:  ____________________________________________________________ Date:  ____________________________________________________________  
  
Yuliana Ulloa Provider Signature:  ____________________________________________________________ Date:  ____________________________________________________________

## 2018-01-12 NOTE — OP NOTES
Total Hip Procedure Note    Amanda Sierra,  [id number],  1950    Pre-operative Diagnosis:  Hip pain, right [M25.551]  Aseptic necrosis of bone of right hip ([address] [M87.051]  Post-operative Diagnosis: Status post right hip replacement    Procedure: Procedure(s) (LRB):  RIGHT HIP ARTHROPLASTY TOTAL  (Right)  Location: [address] [place] [address]  Surgeon: Janice Ventura MD  Assistant: Susan Reese PA-C     Anesthesia: Spinal  Findings: severe degenerative arthritis, acetabular osteophytes and bone spurs around the femoral head. EBL: 300cc  BMI: Body mass index is 30.02 kg/(m^2). Procedure: The complexity of total joint surgery requires the use of a first assistant for positioning, retraction and expertise in closure. Amanda Sierra was brought to the operating room and positioned on the operating table. Anesthesia was administered. A castro catheter was placed preoperatively and IV antibiotics were administered. A time out identifying the patient, procedure, operative side and surgeon was administered and charted by the OR Nurse. The patient was positioned on the contralateral decubitus position. The limb was prepped and draped in the usual sterile fashion. The Posterior approach was utilized to expose the hip. The incision was carried through the subcutaneous tissue and underlying fascia with hemostasis obtained using the bovie cauterization. A Charmley retractor was inserted. The short external rotators were divided at their insertion. The sciatic nerve was palpated and identified. Next, a T-shaped capsular incision was accomplished and femoral head was dislocated. The neck was osteotomized in the appropriate position just above the lesser trochanteric region. The neck cut was measured. Acetabular retractors were placed and the appropriate capsulotomy performed. Soft tissue was removed from the acetabulum. The acetabulum was sequentially reamed. Using trial components the acetabular component was sized.  The acetabular component was impacted at approximately 40 degrees horizontal tilt and 20 degrees anteversion. No  screws were used to fixate the cup. The real polyethylene liner was impacted into position. Utilizing the femoral retractor, the canal was prepared with appropriate lateralization and reamed with the starter reamer. The canal was then broached progressively to accommodate the DePuy stem. The broach was positioned with the anatomic femoral anteversion. A calcar planar was utilized. Trials were preformed using various neck length until the most suitable one was determined and found to be stable to flexion greater than 90 degrees and on internal and external rotation. Limb lengths were thought to be equilibrated and with appropriate stability as mentioned above. All trial components were removed. The cementless permanent stem was impacted into place. A trial reduction was performed and the above neck length was selected. The permanent ceramic femoral head was impacted into place. The hip was reduced and the stability was as mentioned as above. Copious irrigation was accomplished about the surgical site. The sciatic nerve was palpated and noted to be intact. The capsule was repaired with an absorbable suture and the external rotators were reattached with a #5 Mersilene. The operative hip was injected with 60 cc of Neuropin, 1cc of 10 mg of morphine, and 1 cc of 30 mg of Toradol. The Hip was then soaked with a diluted betadine solution for approximately Min and then thoroughly irrigated. A #1 PDS suture was used to re-approximate the fascia. Then, 1 gram (100 mg/ml) of Transexamic Acid was injected into the joint space. An insorb stapler with absorbable sutures were used to approximate the subcutaneous layers. Staples were applied in an occlusive fashion and a sterile bandage was placed. The patient was then rolled to a supine position. The sponge, needle and instrument counts were correct.  The patient tolerated the procedure without difficulty and left the operating room in satisfactory condition. Implants:   Implant Name Type Inv.  Item Serial No.  Lot No. LRB No. Used   CUP ACET PINN 100 W/ 54 --  - [id number]  CUP ACET PINN 100 W/ 54 --  [id number] [facility] [id number] Right 1   PLUG ACET APCL H ELIM POS STP --  - [id number]  PLUG ACET APCL H ELIM POS STP --  [id number] [facility] [id number] Right 1   LINER ACET PINN NEUT [id number] -- [employer] - [id number]  LINER ACET PINN NEUT [id number] -- [employer] [id number] [facility] [id number] Right 1   STEM FEM POR 12/14 TAPR 6 -- [employer] - [id number]  STEM FEM POR 12/14 TAPR 6 -- [employer] [id number] [facility] [id number] Right 1   HEAD FEM S-ROM 36MM +5MM NK -- [employer] - [id number]   HEAD FEM S-ROM 36MM +5MM NK -- [place] [employer] [id number] [facility] [id number] Right 1           Signed By: Myriam Burden MD  1/12/2018,  2:53 PM

## 2018-01-12 NOTE — ANESTHESIA PREPROCEDURE EVALUATION
Anesthetic History     PONV          Review of Systems / Medical History  Patient summary reviewed and pertinent labs reviewed    Pulmonary    COPD: mild    Sleep apnea  Shortness of breath (mild) and smoker (Former)         Neuro/Psych              Cardiovascular    Hypertension  Valvular problems/murmurs (mild): mitral insufficiency        CAD and CABG (2015)  Pertinent negatives: No past MI and angina  Exercise tolerance: >4 METS     GI/Hepatic/Renal     GERD: well controlled           Endo/Other        Arthritis     Other Findings              Physical Exam    Airway  Mallampati: II    Neck ROM: decreased range of motion, short neck   Mouth opening: Normal     Cardiovascular  Regular rate and rhythm,  S1 and S2 normal,  no murmur, click, rub, or gallop             Dental    Dentition: Edentulous     Pulmonary  Breath sounds clear to auscultation               Abdominal         Other Findings            Anesthetic Plan    ASA: 3  Anesthesia type: spinal            Anesthetic plan and risks discussed with: Patient

## 2018-01-12 NOTE — ANESTHESIA PROCEDURE NOTES
Spinal Block    Start time: 1/12/2018 1:14 PM  End time: 1/12/2018 1:18 PM  Performed by: Latoya Whitmore  Authorized by: Latoya Whitmore     Pre-procedure:   Indications: primary anesthetic  Preanesthetic Checklist: patient identified, risks and benefits discussed, anesthesia consent, site marked, patient being monitored and timeout performed    Timeout Time: 13:14          Spinal Block:   Patient Position:  Seated  Prep Region:  Lumbar  Prep: DuraPrep      Location:  L3-4  Technique:  Single shot  Local:  Lidocaine 1%  Local Dose (mL):  3    Needle:   Needle Type:  Pencil-tip  Needle Gauge:  24 G  Attempts:  1      Events: CSF confirmed, no blood with aspiration and no paresthesia        Assessment:  Insertion:  Uncomplicated  Patient tolerance:  Patient tolerated the procedure well with no immediate complications

## 2018-01-12 NOTE — H&P
The patient has end stage arthritis of the right hip. The patient was see and examined and there are no changes to the patient's orthopedic condition. They have tried conservative treatment for this condition; including antiinflammatories and lifestyle modifications and have failed. The necessity for the joint replacement is still present, and the H&P from the office is still current. The patient will be admitted today for Procedure(s) (LRB):  RIGHT HIP ARTHROPLASTY TOTAL  (Right) .

## 2018-01-13 ENCOUNTER — HOME HEALTH ADMISSION (OUTPATIENT)
Dept: HOME HEALTH SERVICES | Facility: HOME HEALTH | Age: 68
End: 2018-01-13
Payer: MEDICARE

## 2018-01-13 VITALS
HEART RATE: 74 BPM | DIASTOLIC BLOOD PRESSURE: 70 MMHG | SYSTOLIC BLOOD PRESSURE: 128 MMHG | WEIGHT: 186 LBS | TEMPERATURE: 98.1 F | RESPIRATION RATE: 16 BRPM | BODY MASS INDEX: 29.89 KG/M2 | OXYGEN SATURATION: 96 % | HEIGHT: 66 IN

## 2018-01-13 PROBLEM — Z96.641 H/O TOTAL HIP ARTHROPLASTY, RIGHT: Status: ACTIVE | Noted: 2018-01-13

## 2018-01-13 LAB
ANION GAP SERPL CALC-SCNC: 8 MMOL/L (ref 7–16)
BUN SERPL-MCNC: 14 MG/DL (ref 8–23)
CALCIUM SERPL-MCNC: 8.7 MG/DL (ref 8.3–10.4)
CHLORIDE SERPL-SCNC: 105 MMOL/L (ref 98–107)
CO2 SERPL-SCNC: 27 MMOL/L (ref 21–32)
CREAT SERPL-MCNC: 0.97 MG/DL (ref 0.8–1.5)
GLUCOSE SERPL-MCNC: 149 MG/DL (ref 65–100)
HGB BLD-MCNC: 12.6 G/DL (ref 13.6–17.2)
POTASSIUM SERPL-SCNC: 4.1 MMOL/L (ref 3.5–5.1)
SODIUM SERPL-SCNC: 140 MMOL/L (ref 136–145)

## 2018-01-13 PROCEDURE — 97116 GAIT TRAINING THERAPY: CPT

## 2018-01-13 PROCEDURE — 74011250637 HC RX REV CODE- 250/637: Performed by: PHYSICIAN ASSISTANT

## 2018-01-13 PROCEDURE — G0008 ADMIN INFLUENZA VIRUS VAC: HCPCS

## 2018-01-13 PROCEDURE — 97165 OT EVAL LOW COMPLEX 30 MIN: CPT

## 2018-01-13 PROCEDURE — 85018 HEMOGLOBIN: CPT | Performed by: PHYSICIAN ASSISTANT

## 2018-01-13 PROCEDURE — 90471 IMMUNIZATION ADMIN: CPT

## 2018-01-13 PROCEDURE — 97161 PT EVAL LOW COMPLEX 20 MIN: CPT

## 2018-01-13 PROCEDURE — 74011250637 HC RX REV CODE- 250/637: Performed by: ORTHOPAEDIC SURGERY

## 2018-01-13 PROCEDURE — 90686 IIV4 VACC NO PRSV 0.5 ML IM: CPT | Performed by: ORTHOPAEDIC SURGERY

## 2018-01-13 PROCEDURE — 97535 SELF CARE MNGMENT TRAINING: CPT

## 2018-01-13 PROCEDURE — 74011250636 HC RX REV CODE- 250/636: Performed by: PHYSICIAN ASSISTANT

## 2018-01-13 PROCEDURE — 97110 THERAPEUTIC EXERCISES: CPT

## 2018-01-13 PROCEDURE — 80048 BASIC METABOLIC PNL TOTAL CA: CPT | Performed by: PHYSICIAN ASSISTANT

## 2018-01-13 PROCEDURE — 74011250636 HC RX REV CODE- 250/636: Performed by: ORTHOPAEDIC SURGERY

## 2018-01-13 PROCEDURE — 36415 COLL VENOUS BLD VENIPUNCTURE: CPT | Performed by: PHYSICIAN ASSISTANT

## 2018-01-13 PROCEDURE — 97150 GROUP THERAPEUTIC PROCEDURES: CPT

## 2018-01-13 RX ORDER — METOPROLOL SUCCINATE 25 MG/1
25 TABLET, EXTENDED RELEASE ORAL DAILY
Status: DISCONTINUED | OUTPATIENT
Start: 2018-01-13 | End: 2018-01-13 | Stop reason: HOSPADM

## 2018-01-13 RX ADMIN — CELECOXIB 200 MG: 200 CAPSULE ORAL at 09:48

## 2018-01-13 RX ADMIN — ASPIRIN 325 MG: 325 TABLET, DELAYED RELEASE ORAL at 09:48

## 2018-01-13 RX ADMIN — SENNOSIDES AND DOCUSATE SODIUM 2 TABLET: 8.6; 5 TABLET ORAL at 09:48

## 2018-01-13 RX ADMIN — HYDROMORPHONE HYDROCHLORIDE 2 MG: 2 TABLET ORAL at 14:43

## 2018-01-13 RX ADMIN — Medication 2 G: at 04:58

## 2018-01-13 RX ADMIN — ACETAMINOPHEN 1000 MG: 500 TABLET, FILM COATED ORAL at 04:14

## 2018-01-13 RX ADMIN — Medication 5 ML: at 04:16

## 2018-01-13 RX ADMIN — HYDROMORPHONE HYDROCHLORIDE 2 MG: 2 TABLET ORAL at 09:48

## 2018-01-13 RX ADMIN — INFLUENZA VIRUS VACCINE 0.5 ML: 15; 15; 15; 15 SUSPENSION INTRAMUSCULAR at 10:30

## 2018-01-13 RX ADMIN — PANTOPRAZOLE SODIUM 40 MG: 40 TABLET, DELAYED RELEASE ORAL at 04:14

## 2018-01-13 RX ADMIN — HYDROMORPHONE HYDROCHLORIDE 2 MG: 2 TABLET ORAL at 04:14

## 2018-01-13 RX ADMIN — METOPROLOL SUCCINATE 25 MG: 25 TABLET, EXTENDED RELEASE ORAL at 10:27

## 2018-01-13 NOTE — PROGRESS NOTES
Problem: Mobility Impaired (Adult and Pediatric)  Goal: *Acute Goals and Plan of Care (Insert Text)  GOALS (1-4 days):  (1.)Mr. Annmarie Langley will move from supine to sit and sit to supine  in bed with SUPERVISION. (2.)Mr. Annmarie Langley will transfer from bed to chair and chair to bed with SUPERVISION using the least restrictive device. (3.)Mr. Annmarie Langley will ambulate with SUPERVISION for 300 feet with the least restrictive device. (4.)Mr. Annmarie Langley will ambulate up/down 2 steps with bilateral  railing with STAND BY ASSIST with no device. (5.)Mr. Annmarie Langley will state/observe ANSELMO precautions with 1 verbal cues. ________________________________________________________________________________________________    PHYSICAL THERAPY Joint camp Anselmo: Initial Assessment, Treatment Day: Day of Assessment, AM 1/13/2018  INPATIENT: Hospital Day: 2  [place] / Product Type: Medicare /      NAME/AGE/GENDER: Gerri Maradiaga is a 78 y.o. male   PRIMARY DIAGNOSIS:  Hip pain, right [M25.551]  Aseptic necrosis of bone of right hip ([facility] [address] [M87.051]   Procedure(s) and Anesthesia Type:     * RIGHT HIP ARTHROPLASTY TOTAL  - Spinal (Right)  ICD-10: Treatment Diagnosis:    · Pain in Right Hip (M25.551)  · Stiffness of Right Hip, Not elsewhere classified (M25.651)  · Difficulty in walking, Not elsewhere classified (R26.2)      ASSESSMENT:     Mr. Annmarie Langley presents s/p R ANSELMO. Patient demonstrates decreased R LE strength and ROM and decreased independence with mobility. Patient ambulatory at baseline with use of cane at times secondary to pain. Patient plans on going home at d/c with assist from his spouse and HHPT. He would benefit from additional therapy intervention to address his strength and mobility prior to d/c. He does need reassurance that he is progressing well as he is a bit anxious. Had L ANSELMO in 2014.     This section established at most recent assessment   PROBLEM LIST (Impairments causing functional limitations):  1. Decreased Strength  2. Decreased ADL/Functional Activities  3. Decreased Transfer Abilities  4. Decreased Ambulation Ability/Technique  5. Decreased Balance  6. Increased Pain  7. Decreased Flexibility/Joint Mobility  8. Decreased Knowledge of Precautions  9. Decreased [place] with Home Exercise Program   INTERVENTIONS PLANNED: (Benefits and precautions of physical therapy have been discussed with the patient.)  1. Bed Mobility  2. Gait Training  3. Home Exercise Program (HEP)  4. Therapeutic Exercise/Strengthening  5. Transfer Training  6. Range of Motion: active/assisted/passive  7. Therapeutic Activities  8. Group Therapy     TREATMENT PLAN: Frequency/Duration: Follow patient BID   to address above goals. Rehabilitation Potential For Stated Goals: Good     RECOMMENDED REHABILITATION/EQUIPMENT: (at time of discharge pending progress): Continue Skilled Therapy and Home Health: Physical Therapy. HISTORY:   History of Present Injury/Illness (Reason for Referral):  R BIJAN 1/12/18  Past Medical History/Comorbidities:   Mr. Yogi Henry  has a past medical history of Abnormal cardiovascular function study (1/26/2016); Adenoma of left adrenal gland; Arthritis; BPH (benign prostatic hyperplasia); Chest discomfort (1/26/2016); Chronic kidney disease; Chronic low back pain; Chronic pain; COPD (chronic obstructive pulmonary disease) ([facility] [address] Coronary artery disease; ED (erectile dysfunction); GERD (gastroesophageal reflux disease); History of basal cell cancer; Hypertension; Mixed hyperlipidemia; Murmur, cardiac; Nausea & vomiting; Palpitations; Pleural effusion exudative (9/30/15); Prostatic hypertrophy, benign (1/26/2016); Sleep apnea; and Status post right hip replacement (1/12/2018). He also has no past medical history of Adverse effect of anesthesia; Difficult intubation; Endocarditis; Malignant hyperthermia due to anesthesia;  Nicotine vapor product user; Non-nicotine vapor product user; Pseudocholinesterase deficiency; or Rheumatic fever. Mr. Gali Lopez  has a past surgical history that includes hx colonoscopy (08/2011); hx refractive surgery; hx heent (Bilateral, 8/2003); hx mohs procedure (Left, 2008); pr cardiac surg procedure unlist (9/2015); hx other surgical; pr total hip arthroplasty (Left, 3/2014); hx lumbar fusion (2013); pr abdomen surgery proc unlisted (Bilateral); and hx orthopaedic (06/13/2017). Social History/Living Environment:   Home Environment: Private residence  # Steps to Enter: 2  Rails to Enter: Yes  Hand Rails : Bilateral  One/Two Story Residence: One story  Living Alone: No  Support Systems: Spouse/Significant Other/Partner  Patient Expects to be Discharged to[de-identified] Private residence  Current DME Used/Available at Home: Maralyn Cheadle, straight, Shower chair, Walker, rollator, Walker, rolling  Tub or Shower Type: Tub/Shower combination  Prior Level of Function/Work/Activity:  Ambulatory with occasional use of cane secondary to pain. Number of Personal Factors/Comorbidities that affect the Plan of Care: 1-2: MODERATE COMPLEXITY   EXAMINATION:   Most Recent Physical Functioning:   Gross Assessment: Yes  Gross Assessment  AROM: Within functional limits (L LE)  Strength:  Within functional limits (L LE)  Coordination: Within functional limits                RLE Strength  R Hip ABduction: 2  R Knee Extension: 3  R Ankle Dorsiflexion: 3+    Bed Mobility  Supine to Sit: Stand-by asssistance  Sit to Supine:  (in chair)    Transfers  Sit to Stand: Stand-by asssistance  Stand to Sit: Stand-by asssistance  Bed to Chair: Stand-by asssistance    Balance  Sitting: Intact  Standing: With support              Weight Bearing Status  Right Side Weight Bearing: As tolerated  Distance (ft): 320 Feet (ft)  Ambulation - Level of Assistance: Stand-by asssistance  Assistive Device: Walker, rolling  Speed/Anastasiya: Pace decreased (<100 feet/min)  Step Length: Left shortened;Right shortened  Stance: Right decreased  Gait Abnormalities: Antalgic  Interventions: Safety awareness training;Verbal cues; Visual/Demos     Braces/Orthotics: none           Body Structures Involved:  1. Joints  2. Muscles Body Functions Affected:  1. Movement Related Activities and Participation Affected:  1. Mobility  2. Self Care  3. Domestic Life  4. Community, Social and [place]   Number of elements that affect the Plan of Care: 4+: HIGH COMPLEXITY   CLINICAL PRESENTATION:   Presentation: Stable and uncomplicated: LOW COMPLEXITY   CLINICAL DECISION MAKIN [facility] Inpatient Short Form  How much difficulty does the patient currently have. .. Unable A Lot A Little None   1. Turning over in bed (including adjusting bedclothes, sheets and blankets)? [] 1   [] 2   [x] 3   [] 4   2. Sitting down on and standing up from a chair with arms ( e.g., wheelchair, bedside commode, etc.)   [] 1   [] 2   [x] 3   [] 4   3. Moving from lying on back to sitting on the side of the bed? [] 1   [] 2   [x] 3   [] 4   How much help from another person does the patient currently need. .. Total A Lot A Little None   4. Moving to and from a bed to a chair (including a wheelchair)? [] 1   [] 2   [x] 3   [] 4   5. Need to walk in hospital room? [] 1   [] 2   [x] 3   [] 4   6. Climbing 3-5 steps with a railing? [] 1   [] 2   [x] 3   [] 4   © , Trustees of [address] [place] [address], under license to [employer]. All rights reserved        Score:  Initial: 18 Most Recent: X (Date: -- )    Interpretation of Tool:  Represents activities that are increasingly more difficult (i.e. Bed mobility, Transfers, Gait). Score 24 23 22-20 19-15 14-10 9-7 6     Modifier CH CI CJ CK CL CM CN      ?  Mobility - Walking and Moving Around:     - CURRENT STATUS: CK - 40%-59% impaired, limited or restricted    - GOAL STATUS: CJ - 20%-39% impaired, limited or restricted    - D/C STATUS:  ---------------To be determined---------------  [place] / Product Type: Medicare /      Medical Necessity:     · Patient is expected to demonstrate progress in strength, range of motion, balance and coordination to increase independence with mobility and ADLs. · Patient demonstrates good rehab potential due to higher previous functional level. Reason for Services/Other Comments:  · Patient continues to require skilled intervention due to decreased R LE strength and ROM and decreased independence with mobility s/p T HA. Use of outcome tool(s) and clinical judgement create a POC that gives a: Clear prediction of patient's progress: LOW COMPLEXITY            TREATMENT:   (In addition to Assessment/Re-Assessment sessions the following treatments were rendered)     Pre-treatment Symptoms/Complaints:  Patient agreeable to therapy. Wants to use the bathroom. Pain: Initial:   Pain Intensity 1: 5  Pain Location 1: Hip  Pain Orientation 1: Right  Pain Intervention(s) 1: Ambulation/Increased Activity, Exercise  Post Session:  5/10     Gait Training ( ):  Gait training to improve and/or restore physical functioning as related to mobility, balance and coordination. Ambulated 320 Feet (ft) with Stand-by asssistance using a Walker, rolling and minimal Safety awareness training;Verbal cues; Visual/Demos related to their stance phase and stride length to promote proper body alignment. Therapeutic Exercise: (10 Minutes):  Exercises per grid below to improve mobility, strength and coordination. Required minimal verbal and tactile cues to promote proper body alignment. Progressed repetitions and complexity of movement as indicated.       Date:  1/13/18 Date:   Date:     ACTIVITY/EXERCISE AM PM AM PM AM PM   GROUP THERAPY  []  []  []  []  []  []   Ankle Pumps 10        Quad Sets 10        Gluteal Sets 10        Hip ABd/ADduction 10        Straight Leg Raises         Knee Slides 10        Short Arc Quads [address] [place] [address] = bilateral; AA = active assistive; A = active; P = passive      Treatment/Session Assessment:     Response to Treatment:  Patient tolerated well. Didn't need physical assist with bed mobility, transfers, or gait - just SBA. Education:  [x] Home Exercises  [x] Fall Precautions  [x] Hip Precautions [] D/C Instruction Review  [] Knee/Hip Prosthesis Review  [x] Walker Management/Safety [] Adaptive Equipment as Needed       Interdisciplinary Collaboration:   o Physical Therapist  o Occupational Therapist  o Registered Nurse    After treatment position/precautions:   o Up in chair  o Bed/Chair-wheels locked  o Caregiver at bedside  o Call light within reach    Compliance with Program/Exercises: compliant all of the time. Recommendations/Intent for next treatment session:  Treatment next visit will focus on increasing Mr. Ori Gusman independence with bed mobility, transfers, gait training, strength/ROM exercises, modalities for pain, and patient education.       Total Treatment Duration:  PT Patient Time In/Time Out  Time In: 0840  Time Out: 444 [facility] YAIMA Roman

## 2018-01-13 NOTE — PROGRESS NOTES
Problem: Self Care Deficits Care Plan (Adult)  Goal: *Acute Goals and Plan of Care (Insert Text)  GOALS: GOAL MET 1/13/2018  DISCHARGE GOALS (in preparation for going home/rehab):  3 days  1. Mr. Lorena Thacker will perform one lower body dressing activity with minimal assistance with adaptive equipment to demonstrate improved functional mobility and safety. 2.  Mr. Lorena Thacker will perform one lower body bathing activity with minimal  assistance with adaptive equipment to demonstrate improved functional mobility and safety. 3.  Mr. Lorena Thacker will perform toileting/toilet transfer with contact guard assistance with adaptive equipment to demonstrate improved functional mobility and safety. 4.  Mr. Lorena Thacker will perform shower transfer with contact guard assistance with adaptive equipment to demonstrate improved functional mobility and safety. 5.  Mr. Lorena Thacker will state BIJAN precautions with two verbal cues to demonstrate improved functional mobility and safety. JOINT CAMP OCCUPATIONAL THERAPY BIJAN: Initial Assessment, Daily Note, Discharge and Treatment Day: Day of Assessment 1/13/2018  INPATIENT: Hospital Day: 2  [place] / Product Type: Medicare /      NAME/AGE/GENDER: Lydia Guo is a 78 y.o. male   PRIMARY DIAGNOSIS:  Hip pain, right [M25.551]  Aseptic necrosis of bone of right hip ([facility] [address] [M87.051]   Procedure(s) and Anesthesia Type:     * RIGHT HIP ARTHROPLASTY TOTAL  - Spinal (Right)  ICD-10: Treatment Diagnosis:    · Pain in Right Hip (M25.551)  · Stiffness of Right Hip, Not elsewhere classified (M25.651)      ASSESSMENT:     Mr. Lorena Thacker is s/p right BIJAN and presents with decreased weight bearing on right LE and decreased independence with functional mobility and activities of daily living. Patient would benefit from skilled Occupational Therapy to maximize independence and safety with self-care task and functional mobility.   Pt would also benefit from education on lower body adaptive equipment and hip precautions post-surgery in preparation for going home or for recommendations for post-hospital rehab program.  Patient plans for further rehab at home with home health services and good family support. Mr. Joanna Iraheta is s/p right BIJAN and presents with decreased weight bearing on right LE and decreased independence with functional mobility and activities of daily living. Patient completed shower and dressing as charter below in ADL grid and is ambulating with rolling walker and stand by assist.  Patient has met 5/5 goals and plans to return home with good family support. Family able to provide patient with appropriate level of assistance at this time. OT reviewed hip precautions throughout session and issued long handled sponge for home use. Patient instructed to call for assistance when needing to get up from recliner and all needs in reach. Patient verbalized understanding of call light. This section established at most recent assessment   PROBLEM LIST (Impairments causing functional limitations):  1. Decreased Strength  2. Decreased ADL/Functional Activities  3. Decreased Transfer Abilities  4. Increased Pain  5. Increased Fatigue  6. Decreased Flexibility/Joint Mobility  7. Decreased Knowledge of Precautions   INTERVENTIONS PLANNED: (Benefits and precautions of occupational therapy have been discussed with the patient.)  1. Activities of daily living training  2. Adaptive equipment training  3. Balance training  4. Clothing management  5. Donning&doffing training  6. Theraputic activity     TREATMENT PLAN: Frequency/Duration: Follow patient 1 time to address above goals. Rehabilitation Potential For Stated Goals: Good     RECOMMENDED REHABILITATION/EQUIPMENT: (at time of discharge pending progress): Continue Skilled Therapy and Home Health: Physical Therapy. OCCUPATIONAL PROFILE AND HISTORY:   History of Present Injury/Illness (Reason for Referral):   Pt presents this date s/p (right) BIJAN.    Past Medical History/Comorbidities:   Mr. Flower Masterson  has a past medical history of Abnormal cardiovascular function study (1/26/2016); Adenoma of left adrenal gland; Arthritis; BPH (benign prostatic hyperplasia); Chest discomfort (1/26/2016); Chronic kidney disease; Chronic low back pain; Chronic pain; COPD (chronic obstructive pulmonary disease) ([facility] [address] Coronary artery disease; ED (erectile dysfunction); GERD (gastroesophageal reflux disease); History of basal cell cancer; Hypertension; Mixed hyperlipidemia; Murmur, cardiac; Nausea & vomiting; Palpitations; Pleural effusion exudative (9/30/15); Prostatic hypertrophy, benign (1/26/2016); Sleep apnea; and Status post right hip replacement (1/12/2018). He also has no past medical history of Adverse effect of anesthesia; Difficult intubation; Endocarditis; Malignant hyperthermia due to anesthesia; Nicotine vapor product user; Non-nicotine vapor product user; Pseudocholinesterase deficiency; or Rheumatic fever. Mr. Flower Masterson  has a past surgical history that includes hx colonoscopy (08/2011); hx refractive surgery; hx heent (Bilateral, 8/2003); hx mohs procedure (Left, 2008); pr cardiac surg procedure unlist (9/2015); hx other surgical; pr total hip arthroplasty (Left, 3/2014); hx lumbar fusion (2013); pr abdomen surgery proc unlisted (Bilateral); and hx orthopaedic (06/13/2017). Social History/Living Environment:   Home Environment: Private residence  One/Two Story Residence: One story  Living Alone: No  Support Systems: Spouse/Significant Other/Partner  Patient Expects to be Discharged to[de-identified] Private residence  Current DME Used/Available at Home: Monia Lundborg, quad, [address] [facility] [address] chair, Walker  Prior Level of Function/Work/Activity:  Independent      Number of Personal Factors/Comorbidities that affect the Plan of Care: Brief history (0):  LOW COMPLEXITY   ASSESSMENT OF OCCUPATIONAL PERFORMANCE[de-identified]   Most Recent Physical Functioning:   Balance  Sitting: Intact  Standing: Intact; With support       Patient Vitals for the past 6 hrs:   BP BP Patient Position SpO2 Pulse   01/13/18 0540 122/67 At rest 95 % 80   01/13/18 0730 124/71 - 95 % 79                    Coordination  Fine Motor Skills-Upper: Left Intact; Right Intact  Gross Motor Skills-Upper: Left Intact; Right Intact         Mental Status  Neurologic State: Alert  Orientation Level: Oriented X4  Cognition: Appropriate decision making  Perception: Appears intact  Safety/Judgement: Awareness of environment                Basic ADLs (From Assessment) Complex ADLs (From Assessment)   Basic ADL  Feeding: Independent  Oral Facial Hygiene/Grooming: Supervision  Bathing: Stand-by assistance (seated on shower chair)  Upper Body Dressing: Supervision  Lower Body Dressing: Minimum assistance (verbal cues for tech, rehab aid helping)  Toileting: Stand by assistance (elevated seat, walker and grab bars)     Grooming/Bathing/Dressing Activities of Daily Living     Cognitive Retraining  Safety/Judgement: Awareness of environment                 Functional Transfers  Toilet Transfer : Stand-by asssistance  Shower Transfer: Stand-by asssistance     Bed/Mat Mobility  Sit to Stand: Stand-by asssistance         Physical Skills Involved:  1. Range of Motion  2. Balance  3. Strength Cognitive Skills Affected (resulting in the inability to perform in a timely and safe manner): 1. none Psychosocial Skills Affected:  1. Environmental Adaptation   Number of elements that affect the Plan of Care: 3-5:  MODERATE COMPLEXITY   CLINICAL DECISION MAKING:   [facility] 6 Clicks   Daily Activity Inpatient Short Form  How much help from another person does the patient currently need. .. Total A Lot A Little None   1. Putting on and taking off regular lower body clothing? [] 1   [] 2   [x] 3   [] 4   2. Bathing (including washing, rinsing, drying)? [] 1   [] 2   [] 3   [x] 4   3.   Toileting, which includes using toilet, bedpan or urinal?   [] 1   [] 2   [] 3   [x] 4   4. Putting on and taking off regular upper body clothing? [] 1   [] 2   [] 3   [x] 4   5. Taking care of personal grooming such as brushing teeth? [] 1   [] 2   [] 3   [x] 4   6. Eating meals? [] 1   [] 2   [] 3   [x] 4   © 2007, [employer] [employer], under license to [employer]. All rights reserved     Score:  Initial:23 Most Recent: X (Date: -- )    Interpretation of Tool:  Represents activities that are increasingly more difficult (i.e. Bed mobility, Transfers, Gait). Score 24 23 22-20 19-15 14-10 9-7 6     Modifier CH CI CJ CK CL CM CN      ? Self Care:     - CURRENT STATUS: CI - 1%-19% impaired, limited or restricted    - GOAL STATUS: CI - 1%-19% impaired, limited or restricted    - D/C STATUS:  CI - 1%-19% impaired, limited or restricted  [place] / Product Type: Medicare /      Medical Necessity:     · Patient is expected to demonstrate progress in range of motion, coordination and functional technique to increase independence with self care. Reason for Services/Other Comments:  · Patient would benefit from skilled Occupational Therapy to maximize independence and safety with self-care task and functional mobility. .   Use of outcome tool(s) and clinical judgement create a POC that gives a: LOW COMPLEXITY            TREATMENT:   (In addition to Assessment/Re-Assessment sessions the following treatments were rendered)     Pre-treatment Symptoms/Complaints:  No complaint of pain during self care  Pain: Initial:   Pain Intensity 1: 0  Post Session:  0   In addition to assessment   Self Care: (30min): Procedure(s) (per grid) utilized to improve and/or restore self-care/home management as related to dressing, bathing, toileting and grooming. Required minimal verbal and   cueing to facilitate activities of daily living skills and compensatory activities.     Treatment/Session Assessment:     Response to Treatment:  Pt up to aury .    Education:  [] Home Exercises  [x] Fall Precautions  [x] Hip Precautions [] Going Home Video  [] Knee/Hip Prosthesis Review  [x] Walker Management/Safety [x] Adaptive Equipment as Needed       Interdisciplinary Collaboration:   o Physical Therapist  o Occupational Therapist  o Registered Nurse    After treatment position/precautions:   o Up in chair  o Bed/Chair-wheels locked  o Call light within reach  o RN notified  o Family at bedside     Compliance with Program/Exercises: compliant all of the time.     Recommendations/Intent for next treatment session:  Pt will do well at home with wife     Total Treatment Duration:  OT Patient Time In/Time Out  Time In: 0800  Time Out: [address] [facility], OT

## 2018-01-13 NOTE — PROGRESS NOTES
[address]  Face to Face Encounter    Patients Name: Darling Barajas    YOB: 1950    Ordering Physician: Cece Vega    Primary Diagnosis: RTHA    Date of Face to Face:   1/12/2018         Face to Face Encounter findings are related to primary reason for home care:   yes. 1. I certify that the patient needs intermittent care as follows: physical therapy: strengthening    2. I certify that this patient is homebound, that is: 1) patient requires the use of a walker device, special transportation, or assistance of another to leave the home; or 2) patient's condition makes leaving the home medically contraindicated; and 3) patient has a normal inability to leave the home and leaving the home requires considerable and taxing effort. Patient may leave the home for infrequent and short duration for medical reasons, and occasional absences for non-medical reasons. Homebound status is due to the following functional limitations: Patient with strength deficits limiting the performance of all ADL's without caregiver assistance or the use of an assistive device. 3. I certify that this patient is under my care and that I, or a nurse practitioner or  [id number], or clinical nurse specialist, or certified nurse midwife, working with me, had a Face-to-Face Encounter that meets the physician Face-to-Face Encounter requirements. The following are the clinical findings from the [address] [place] [address] encounter that support the need for skilled services and is a summary of the encounter: See hospital chart. See attached progess note      Ramiro Orellana LMSW  5/31/2017      THE FOLLOWING TO BE COMPLETED BY THE COMMUNITY PHYSICIAN:    I concur with the findings described above from the [facility] encounter that this patient is homebound and in need of a skilled service.     Certifying Physician: _____________________________________      Printed Certifying Physician Name: _____________________________________    Date: _________________

## 2018-01-13 NOTE — PROGRESS NOTES
Problem: Mobility Impaired (Adult and Pediatric)  Goal: *Acute Goals and Plan of Care (Insert Text)  GOALS (1-4 days):  (1.)Mr. Daniela Sal will move from supine to sit and sit to supine  in bed with SUPERVISION. (2.)Mr. Daniela Sal will transfer from bed to chair and chair to bed with SUPERVISION using the least restrictive device. (3.)Mr. Daniela Sal will ambulate with SUPERVISION for 300 feet with the least restrictive device. (4.)Mr. Daniela Sal will ambulate up/down 2 steps with bilateral  railing with STAND BY ASSIST with no device. Met 1/13/18  (5.)Mr. Daniela Sal will state/observe ANSELMO precautions with 1 verbal cues. ________________________________________________________________________________________________    PHYSICAL THERAPY [facility] [place]: Daily Note, PM 1/13/2018  INPATIENT: Hospital Day: 2  [place] / [place] / Product Type: Medicare /      NAME/AGE/GENDER: Tee Blackburn is a 78 y.o. male   PRIMARY DIAGNOSIS:  Hip pain, right [M25.551]  Aseptic necrosis of bone of right hip ([facility] [address] [M87.051]   Procedure(s) and Anesthesia Type:     * RIGHT HIP ARTHROPLASTY TOTAL  - Spinal (Right)  ICD-10: Treatment Diagnosis:    · Pain in Right Hip (M25.551)  · Stiffness of Right Hip, Not elsewhere classified (M25.651)  · Difficulty in walking, Not elsewhere classified (R26.2)      ASSESSMENT:     Mr. Daniela Sal presents s/p R ANSELMO. Patient demonstrates decreased R LE strength and ROM and decreased independence with mobility. Patient ambulatory at baseline with use of cane at times secondary to pain. Patient plans on going home at d/c with assist from his spouse and HHPT. He would benefit from additional therapy intervention to address his strength and mobility prior to d/c. He does need reassurance that he is progressing well as he is a bit anxious. Had L ANSELMO in 2014. Patient participated well with group session this afternoon.   Patient with good balance and mobility with walker and good demonstration of all exercises. Reviewed/demonstrated ways of getting in/out of bed and reviewed hip precautions. Patient feeling more confident this afternoon and would like to d/c home. This section established at most recent assessment   PROBLEM LIST (Impairments causing functional limitations):  1. Decreased Strength  2. Decreased ADL/Functional Activities  3. Decreased Transfer Abilities  4. Decreased Ambulation Ability/Technique  5. Decreased Balance  6. Increased Pain  7. Decreased Flexibility/Joint Mobility  8. Decreased Knowledge of Precautions  9. Decreased [place] with Home Exercise Program   INTERVENTIONS PLANNED: (Benefits and precautions of physical therapy have been discussed with the patient.)  1. Bed Mobility  2. Gait Training  3. Home Exercise Program (HEP)  4. Therapeutic Exercise/Strengthening  5. Transfer Training  6. Range of Motion: active/assisted/passive  7. Therapeutic Activities  8. Group Therapy     TREATMENT PLAN: Frequency/Duration: Follow patient BID   to address above goals. Rehabilitation Potential For Stated Goals: Good     RECOMMENDED REHABILITATION/EQUIPMENT: (at time of discharge pending progress): Continue Skilled Therapy and Home Health: Physical Therapy. HISTORY:   History of Present Injury/Illness (Reason for Referral):  R BIJAN 1/12/18  Past Medical History/Comorbidities:   Mr. Shira Roland  has a past medical history of Abnormal cardiovascular function study (1/26/2016); Adenoma of left adrenal gland; Arthritis; BPH (benign prostatic hyperplasia); Chest discomfort (1/26/2016); Chronic kidney disease; Chronic low back pain; Chronic pain; COPD (chronic obstructive pulmonary disease) ([facility] [address] Coronary artery disease; ED (erectile dysfunction); GERD (gastroesophageal reflux disease); History of basal cell cancer; Hypertension; Mixed hyperlipidemia; Murmur, cardiac; Nausea & vomiting; Palpitations; Pleural effusion exudative (9/30/15); Prostatic hypertrophy, benign (1/26/2016);  Sleep apnea; and Status post right hip replacement (1/12/2018). He also has no past medical history of Adverse effect of anesthesia; Difficult intubation; Endocarditis; Malignant hyperthermia due to anesthesia; Nicotine vapor product user; Non-nicotine vapor product user; Pseudocholinesterase deficiency; or Rheumatic fever. Mr. Parish Resendez  has a past surgical history that includes hx colonoscopy (08/2011); hx refractive surgery; hx heent (Bilateral, 8/2003); hx mohs procedure (Left, 2008); pr cardiac surg procedure unlist (9/2015); hx other surgical; pr total hip arthroplasty (Left, 3/2014); hx lumbar fusion (2013); pr abdomen surgery proc unlisted (Bilateral); and hx orthopaedic (06/13/2017). Social History/Living Environment:   Home Environment: Private residence  # Steps to Enter: 2  Rails to Enter: Yes  Hand Rails : Bilateral  One/Two Story Residence: One story  Living Alone: No  Support Systems: Spouse/Significant Other/Partner  Patient Expects to be Discharged to[de-identified] Private residence  Current DME Used/Available at Home: Donna Bunk, straight, Shower chair, Walker, rollator, Walker, rolling  Tub or Shower Type: Tub/Shower combination  Prior Level of Function/Work/Activity:  Ambulatory with occasional use of cane secondary to pain. Number of Personal Factors/Comorbidities that affect the Plan of Care: 1-2: MODERATE COMPLEXITY   EXAMINATION:   Most Recent Physical Functioning:   Gross Assessment: Yes  Gross Assessment  AROM: Within functional limits (L LE)  Strength:  Within functional limits (L LE)  Coordination: Within functional limits                RLE Strength  R Hip ABduction: 2  R Knee Extension: 3  R Ankle Dorsiflexion: 3+    Bed Mobility  Supine to Sit: Stand-by asssistance  Sit to Supine:  (in chair)    Transfers  Sit to Stand: Supervision  Stand to Sit: Supervision  Bed to Chair: Stand-by asssistance    Balance  Sitting: Intact  Standing: With support              Weight Bearing Status  Right Side Weight Bearing: As tolerated  Distance (ft): 108 Feet (ft) (x 2)  Ambulation - Level of Assistance: Supervision  Assistive Device: Walker, rolling  Speed/Anastasiya: Pace decreased (<100 feet/min)  Step Length: Left shortened;Right shortened  Stance: Right decreased  Gait Abnormalities: Antalgic  Number of Stairs Trained: 3  Stairs - Level of Assistance: Stand-by asssistance  Rail Use: Right   Interventions: Safety awareness training;Verbal cues     Braces/Orthotics: none           Body Structures Involved:  1. Joints  2. Muscles Body Functions Affected:  1. Movement Related Activities and Participation Affected:  1. Mobility  2. Self Care  3. Domestic Life  4. Community, Social and [place]   Number of elements that affect the Plan of Care: 4+: HIGH COMPLEXITY   CLINICAL PRESENTATION:   Presentation: Stable and uncomplicated: LOW COMPLEXITY   CLINICAL DECISION MAKIN [facility] Inpatient Short Form  How much difficulty does the patient currently have. .. Unable A Lot A Little None   1. Turning over in bed (including adjusting bedclothes, sheets and blankets)? [] 1   [] 2   [x] 3   [] 4   2. Sitting down on and standing up from a chair with arms ( e.g., wheelchair, bedside commode, etc.)   [] 1   [] 2   [x] 3   [] 4   3. Moving from lying on back to sitting on the side of the bed? [] 1   [] 2   [x] 3   [] 4   How much help from another person does the patient currently need. .. Total A Lot A Little None   4. Moving to and from a bed to a chair (including a wheelchair)? [] 1   [] 2   [x] 3   [] 4   5. Need to walk in hospital room? [] 1   [] 2   [x] 3   [] 4   6. Climbing 3-5 steps with a railing? [] 1   [] 2   [x] 3   [] 4   © , Trustees of [address] [place] [address] [address] [address], under license to [employer].  All rights reserved        Score:  Initial: 18 Most Recent: X (Date: -- )    Interpretation of Tool:  Represents activities that are increasingly more difficult (i.e. Bed mobility, Transfers, Gait). Score 24 23 22-20 19-15 14-10 9-7 6     Modifier CH CI CJ CK CL CM CN      ? Mobility - Walking and Moving Around:     - CURRENT STATUS: CK - 40%-59% impaired, limited or restricted    - GOAL STATUS: CJ - 20%-39% impaired, limited or restricted    - D/C STATUS:  ---------------To be determined---------------  [place] / Product Type: Medicare /      Medical Necessity:     · Patient is expected to demonstrate progress in strength, range of motion, balance and coordination to increase independence with mobility and ADLs. · Patient demonstrates good rehab potential due to higher previous functional level. Reason for Services/Other Comments:  · Patient continues to require skilled intervention due to decreased R LE strength and ROM and decreased independence with mobility s/p T HA. Use of outcome tool(s) and clinical judgement create a POC that gives a: Clear prediction of patient's progress: LOW COMPLEXITY            TREATMENT:   (In addition to Assessment/Re-Assessment sessions the following treatments were rendered)     Pre-treatment Symptoms/Complaints:  Patient ready for group session. Reports he is going home today. Pain: Initial:   Pain Intensity 1: 3  Pain Location 1: Hip  Pain Orientation 1: Right  Pain Intervention(s) 1: Ambulation/Increased Activity, Exercise  Post Session:  3/10     Gait Training (15 Minutes):  Gait training to improve and/or restore physical functioning as related to mobility, balance and coordination. Ambulated 108 Feet (ft) (x 2) with Supervision using a Walker, rolling and minimal Safety awareness training;Verbal cues related to their stance phase and stride length to promote proper body alignment. Therapeutic Exercise: (45 Minutes (group)):  Exercises per grid below to improve mobility, strength and coordination. Required minimal verbal and tactile cues to promote proper body alignment.   Progressed repetitions and complexity of movement as indicated. Date:  1/13/18 Date:   Date:     ACTIVITY/EXERCISE AM PM AM PM AM PM   GROUP THERAPY  []  [x]  []  []  []  []   Ankle Pumps 10 15       Quad Sets 10 15       Gluteal Sets 10 15       Hip ABd/ADduction 10 15       Straight Leg Raises         Knee Slides 10 15       Short Arc Quads 10 15       Long Arc Quads  15       Chair Slides                  B = bilateral; AA = active assistive; A = active; P = passive      Treatment/Session Assessment:     Response to Treatment:  Patient tolerated well. Good mobility and balance with walker. Good demonstration of exercises. Feeling more confident this afternoon. Education:  [x] Home Exercises  [x] Fall Precautions  [x] Hip Precautions [x] D/C Instruction Review  [x] Knee/Hip Prosthesis Review  [x] Walker Management/Safety [] Adaptive Equipment as Needed       Interdisciplinary Collaboration:   o Physical Therapist  o Registered Nurse  o Rehabilitation Attendant    After treatment position/precautions:   o Up in chair  o Bed/Chair-wheels locked  o Caregiver at bedside  o Call light within reach    Compliance with Program/Exercises: compliant all of the time. Recommendations/Intent for next treatment session:  Treatment next visit will focus on increasing Mr. Toño Quezada independence with bed mobility, transfers, gait training, strength/ROM exercises, modalities for pain, and patient education.       Total Treatment Duration:  PT Patient Time In/Time Out  Time In: 1300  Time Out: [facility] YAIMA Roman

## 2018-01-13 NOTE — DISCHARGE INSTRUCTIONS
[facility]   Patient Discharge Instructions    Mehul Schreiber / [id number] : 1950    Admitted 2018 Discharged: 2018     IF YOU HAVE ANY PROBLEMS ONCE YOU ARE AT HOME CALL THE FOLLOWING NUMBERS:   Main office number: [phone]      Medications    · The medications you are to continue on are listed on the medication reconciliation sheet. · Narcotic pain medications as well as supplemental iron can cause constipation. If this occurs try stopping the narcotic pain medication and/or the iron. · It is important that you take the medication exactly as they are prescribed. · Medications which increase your risk of blood clots are listed to stop for 5 weeks after surgery as well as medications or supplements which increase your risk of bleeding complications. · Keep your medication in the bottles provided by the pharmacist and keep a list of the medication names, dosages, and times to be taken in your wallet. · Do not take other medications without consulting your doctor. Important Information    Do NOT smoke as this will greatly increase your risk of infection! Resume your prehospital diet. If you have excessive nausea or vomitting call your doctor's office     Leg swelling and warmth is normal for 6 months after surgery. If you experience swelling in your leg elevate you leg while laying down with your toes above your heart. If you have sudden onset severe swelling with leg pain call our office. Use Juno Hose stockings until we see you in the office for your follow up appointment. The stitches deep inside take approximately 6 months to dissolve. There will be sharp shooting, stinging and burning pain. This is normal and will resolve between 3-6 months after surgery. Difficulty sleeping is normal following total Knee and Hip replacement. You may try melatonin, an over-the-counter sleep aid or benadryl to help with sleep.  Most patients will resume sleeping through the night 8 weeks after surgery. Home Physical Therapy is arranged. Home Health will contact you within 48 hrs of discharge that you have chosen. If you have not received a call within this time frame please contact that provider you chose. You should be given this information before you leave the hospital.     You are at a risk for falls. Use the rolling walker when walking. Patients who have had a joint replacement should not drive if they are still taking narcotic pain mediation during the daytime hours. Most patients wean themselves off of pain medication within 2-5 weeks after surgery. When to Call the office    - If you have a temperature greater then 101  - Uncontrolled vomiting   - Loose control of your bladder or bowel function  - Are unable to bear any wieght   - Need a pain medication refill     Information obtained by :  I understand that if any problems occur once I am at home I am to contact my physician. I understand and acknowledge receipt of the instructions indicated above. Physician's or R.N.'s Signature                                                                  Date/Time                                                                                                                                              Patient or Representative Signature                                                          Date/Time       Hip Replacement Surgery (Posterior): What to Expect at Home  Your Recovery  Hip replacement surgery replaces the worn parts of your hip joint. When you leave the hospital, you will probably be walking with crutches or a walker. You may be able to climb a few stairs and get in and out of bed and chairs. But you will need someone to help you at home for the next few weeks or until you have more energy and can move around better. If there is no one to help you at home, you may go to a rehabilitation center or long-term care center. You will go home with a bandage and stitches or staples. You can remove the bandage when your doctor tells you to. Your doctor will remove your stitches or staples 10 days to 3 weeks after your surgery. You may still have some mild pain, and the area may be swollen for 3 to 4 months after surgery. Your doctor will give you medicine for the pain. You will continue the rehabilitation program (rehab) you started in the hospital. The better you do with your rehab exercises, the sooner you will get your strength and movement back. Most people are able to return to work 4 weeks to 4 months after surgery. This care sheet gives you a general idea about how long it will take for you to recover. But each person recovers at a different pace. Follow the steps below to get better as quickly as possible. How can you care for yourself at home? Activity  ? · Your doctor may not want your affected leg to cross the center of your body toward the other leg. If so, your therapist may suggest these ideas:  ¨ Do not cross your legs. ¨ Be very careful as you get in or out of bed or a car, so your leg does not cross that imaginary line in the middle of your body. ? · Rest when you feel tired. You may take a nap, but do not stay in bed all day. ? · Work with your physical therapist to learn the best way to exercise. You may be able to take frequent, short walks using crutches or a walker. You will probably have to use crutches or a walker for at least 4 to 6 weeks. ? · Your doctor may advise you to stay away from activities that put stress on the joint. This includes sports such as tennis, football, and jogging. ? · Try not to sit for too long at one time. You will feel less stiff if you take a short walk about every hour. When you sit, use chairs with arms, and do not sit in low chairs.    ? · Do not bend over more than 90 degrees (like the angle in a letter \"L\"). ? · Sleep on your back with your legs slightly apart or on your side with a pillow between your knees for about 6 weeks or as your doctor tells you. Do not sleep on your stomach or affected leg. ? · You may need to take sponge baths until your stitches or staples have been removed. You will probably be able to shower 24 hours after they are removed. ? · Ask your doctor when you can drive again. ? · Most people are able to return to work 4 weeks to 4 months after surgery. ? · Ask your doctor when it is okay for you to have sex. Diet  ? · By the time you leave the hospital, you will probably be eating your normal diet. If your stomach is upset, try bland, low-fat foods like plain rice, broiled chicken, toast, and yogurt. Your doctor may recommend that you take iron and vitamin supplements. ? · Drink plenty of fluids (unless your doctor tells you not to). ? · Eat healthy foods, and watch your portion sizes. Try to stay at your ideal weight. Too much weight puts more stress on your new hip joint. ? · You may notice that your bowel movements are not regular right after your surgery. This is common. Try to avoid constipation and straining with bowel movements. You may want to take a fiber supplement every day. If you have not had a bowel movement after a couple of days, ask your doctor about taking a mild laxative. Medicines  ? · Your doctor will tell you if and when you can restart your medicines. He or she will also give you instructions about taking any new medicines. ? · If you take blood thinners, such as warfarin (Coumadin), clopidogrel (Plavix), or aspirin, be sure to talk to your doctor. He or she will tell you if and when to start taking those medicines again. Make sure that you understand exactly what your doctor wants you to do.   ? · Your doctor may give you a blood-thinning medicine to prevent blood clots.  If you take a blood thinner, be sure you get instructions about how to take your medicine safely. Blood thinners can cause serious bleeding problems. This medicine could be in pill form or as a shot (injection). If a shot is necessary, your doctor will tell you how to do this. ? · Be safe with medicines. Take pain medicines exactly as directed. ¨ If the doctor gave you a prescription medicine for pain, take it as prescribed. ¨ If you are not taking a prescription pain medicine, ask your doctor if you can take an over-the-counter medicine. ? · If you think your pain medicine is making you sick to your stomach:  ¨ Take your medicine after meals (unless your doctor has told you not to). ¨ Ask your doctor for a different pain medicine. ? · If your doctor prescribed antibiotics, take them as directed. Do not stop taking them just because you feel better. You need to take the full course of antibiotics. Incision care  ? · You will have a bandage over the cut (incision) and staples or stitches. Follow your doctor's instructions on when to take the bandage off. Giving the incision air will help it heal.   ? · Your doctor will remove the staples or stitches 10 days to 3 weeks after the surgery and replace them with strips of tape. Leave the strips on for a week or until they fall off. Exercise  ? · Your rehab program will include a number of exercises to do. Always do them as your therapist tells you. Ice and elevation  ? · For pain, put ice or a cold pack on the area for 10 to 20 minutes at a time. Put a thin cloth between the ice and your skin. ? · Your ankle may swell for about 3 months. Prop up your ankle when you ice it or anytime you sit or lie down. Try to keep it above the level of your heart. This will help reduce swelling. Other instructions  ? Continue to wear your support stockings as your doctor says. These help to prevent blood clots.  The length of time that you will have to wear them depends on your activity level and the amount of swelling you have. Most people wear these stockings for 4 to 6 weeks after surgery. ?Preventing falls is also very important. To prevent falls:  ? · Arrange furniture so that you will not trip on it. ? · Get rid of throw rugs, and move electrical cords out of the way. ? · Walk only in areas with plenty of light. ? · Put grab bars in showers and bathtubs. ? · Avoid icy or snowy sidewalks. ? · Wear shoes with sturdy, flat soles. Follow-up care is a key part of your treatment and safety. Be sure to make and go to all appointments, and call your doctor if you are having problems. It's also a good idea to know your test results and keep a list of the medicines you take. When should you call for help? Call 911 anytime you think you may need emergency care. For example, call if:  ? · You passed out (lost consciousness). ? · You have severe trouble breathing. ? · You have sudden chest pain and shortness of breath, or you cough up blood. ?Call your doctor now or seek immediate medical care if:  ? · You have signs that your hip may be dislocated, including:  ¨ Severe pain and not being able to stand. ¨ A crooked leg that looks like your hip is out of position. ¨ Not being able to bend or straighten your leg. ? · Your leg or foot is cool or pale or changes color. ? · You cannot feel or move your leg. ? · You have signs of a blood clot, such as:  ¨ Pain in your calf, back of the knee, thigh, or groin. ¨ Redness and swelling in your leg or groin. ? · Your incision comes open and begins to bleed, or the bleeding increases. ? · You feel like your heart is racing or beating irregularly. ? · You have signs of infection, such as:  ¨ Increased pain, swelling, warmth, or redness. ¨ Red streaks leading from the incision. ¨ Pus draining from the incision. ¨ A fever. ? Watch closely for changes in your health, and be sure to contact your doctor if:  ? · You do not have a bowel movement after taking a laxative. ? · You do not get better as expected. Where can you learn more? Go to http://owen-darvin.info/. Enter D457 in the search box to learn more about \"Hip Replacement Surgery (Posterior): What to Expect at Home. \"  Current as of: March 21, 2017  Content Version: 11.4  © 9593-4784 [employer]. Care instructions adapted under license by [employer] (which disclaims liability or warranty for this information). If you have questions about a medical condition or this instruction, always ask your healthcare professional. [place] [address] any warranty or liability for your use of this information. DISCHARGE SUMMARY from Nurse    PATIENT INSTRUCTIONS:    After general anesthesia or intravenous sedation, for 24 hours or while taking prescription Narcotics:  · Limit your activities  · Do not drive and operate hazardous machinery  · Do not make important personal or business decisions  · Do  not drink alcoholic beverages  · If you have not urinated within 8 hours after discharge, please contact your surgeon on call. Report the following to your surgeon:  · Excessive pain, swelling, redness or odor of or around the surgical area  · Temperature over 100.5  · Nausea and vomiting lasting longer than 4 hours or if unable to take medications  · Any signs of decreased circulation or nerve impairment to extremity: change in color, persistent  numbness, tingling, coldness or increase pain  · Any questions    These are general instructions for a healthy lifestyle:    No smoking/ No tobacco products/ Avoid exposure to second hand smoke  Surgeon General's Warning:  Quitting smoking now greatly reduces serious risk to your health.     Obesity, smoking, and sedentary lifestyle greatly increases your risk for illness    A healthy diet, regular physical exercise & weight monitoring are important for maintaining a healthy lifestyle    You may be retaining fluid if you have a history of heart failure or if you experience any of the following symptoms:  Weight gain of 3 pounds or more overnight or 5 pounds in a week, increased swelling in our hands or feet or shortness of breath while lying flat in bed. Please call your doctor as soon as you notice any of these symptoms; do not wait until your next office visit. Recognize signs and symptoms of STROKE:    F-face looks uneven    A-arms unable to move or move unevenly    S-speech slurred or non-existent    T-time-call 911 as soon as signs and symptoms begin-DO NOT go       Back to bed or wait to see if you get better-TIME IS BRAIN. Warning Signs of HEART ATTACK     Call 911 if you have these symptoms:   Chest discomfort. Most heart attacks involve discomfort in the center of the chest that lasts more than a few minutes, or that goes away and comes back. It can feel like uncomfortable pressure, squeezing, fullness, or pain.  Discomfort in other areas of the upper body. Symptoms can include pain or discomfort in one or both arms, the back, neck, jaw, or stomach.  Shortness of breath with or without chest discomfort.  Other signs may include breaking out in a cold sweat, nausea, or lightheadedness. Don't wait more than five minutes to call 911 - MINUTES MATTER! Fast action can save your life. Calling 911 is almost always the fastest way to get lifesaving treatment. Emergency Medical Services staff can begin treatment when they arrive -- up to an hour sooner than if someone gets to the hospital by car. The discharge information has been reviewed with the patient. The patient verbalized understanding. Discharge medications reviewed with the patient and appropriate educational materials and side effects teaching were provided.   ___________________________________________________________________________________________________________________________________

## 2018-01-13 NOTE — PROGRESS NOTES
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 Objective:     PT/OT:     PATIENT MOBILITY                           Vital Signs:    Blood pressure 122/67, pulse 80, temperature 98 °F (36.7 °C), resp. rate 18, height 5' 6\" (1.676 m), weight 84.4 kg (186 lb), SpO2 95 %.   Temp (24hrs), Av.5 °F (36.4 °C), Min:96 °F (35.6 °C), Max:98.1 °F (36.7 °C)      Pain Control:   Pain Assessment  Pain Scale 1: Numeric (0 - 10)  Pain Intensity 1: 5  Pain Onset 1: about  a year  Pain Location 1: Hip  Pain Orientation 1: Right  Pain Description 1: Aching  Pain Intervention(s) 1: Medication (see MAR)    Meds:  Current Facility-Administered Medications   Medication Dose Route Frequency    tuberculin injection 5 Units  5 Units IntraDERMal ONCE    lisinopril (PRINIVIL, ZESTRIL) tablet 5 mg  5 mg Oral QHS    pantoprazole (PROTONIX) tablet 40 mg  40 mg Oral ACB    tamsulosin (FLOMAX) capsule 0.4 mg  0.4 mg Oral QHS    0.9% sodium chloride infusion  100 mL/hr IntraVENous CONTINUOUS    sodium chloride (NS) flush 5-10 mL  5-10 mL IntraVENous Q8H    sodium chloride (NS) flush 5-10 mL  5-10 mL IntraVENous PRN    acetaminophen (TYLENOL) tablet 1,000 mg  1,000 mg Oral Q6H    celecoxib (CELEBREX) capsule 200 mg  200 mg Oral Q12H    HYDROmorphone (DILAUDID) tablet 2 mg  2 mg Oral Q4H PRN    HYDROmorphone (PF) (DILAUDID) injection 1 mg  1 mg IntraVENous Q3H PRN    naloxone (NARCAN) injection 0.2-0.4 mg  0.2-0.4 mg IntraVENous Q10MIN PRN    dexamethasone (DECADRON) injection 10 mg  10 mg IntraVENous ONCE    ondansetron (ZOFRAN) injection 4 mg  4 mg IntraVENous Q4H PRN    diphenhydrAMINE (BENADRYL) capsule 25 mg  25 mg Oral Q4H PRN    senna-docusate (PERICOLACE) 8.6-50 mg per tablet 2 Tab 2 Tab Oral DAILY    aspirin delayed-release tablet 325 mg  325 mg Oral Q12H    PPD Check Skin Test Reminder Note x 3 Lucia  1 Each Other Q24H    influenza vaccine 2017-18 (3 yrs+)(PF) (FLUZONE QUAD/FLUARIX QUAD) injection 0.5 mL  0.5 mL IntraMUSCular ONCE        LAB:    Lab Results   Component Value Date/Time    INR 0.9 01/05/2018 09:32 AM    INR 1.1 09/16/2015 12:15 PM    INR 1.0 09/14/2015 08:00 AM     Lab Results   Component Value Date/Time    HGB 12.6 01/13/2018 05:33 AM    HGB 13.0 01/12/2018 07:17 PM    HGB 15.0 01/05/2018 09:32 AM       Wound Back Medial (Active)   Number of days:214       Wound Hip Right (Active)   DRESSING STATUS Clean, dry, and intact 1/12/2018  4:10 PM   DRESSING TYPE Other (Comment) 1/12/2018  4:10 PM   Drainage Amount  None 1/12/2018  4:10 PM   Number of days:1       Wound Hip Right (Active)   Number of days:1             Physical Exam:  Calves soft/ neuro intact      Assessment:      Principal Problem:    Status post right hip replacement (1/12/2018)         Plan:     Continue PT/OT/Rehab  Consult: Rehab team including PT, OT, recreational therapy, and    Home soon    Patient Expects to be Discharged to[de-identified] Private residence     Signed By: Nicole Fernando MD

## 2018-01-15 ENCOUNTER — HOME CARE VISIT (OUTPATIENT)
Dept: SCHEDULING | Facility: HOME HEALTH | Age: 68
End: 2018-01-15
Payer: MEDICARE

## 2018-01-15 VITALS
BODY MASS INDEX: 29.73 KG/M2 | HEIGHT: 66 IN | DIASTOLIC BLOOD PRESSURE: 78 MMHG | WEIGHT: 185 LBS | HEART RATE: 80 BPM | RESPIRATION RATE: 16 BRPM | SYSTOLIC BLOOD PRESSURE: 130 MMHG | TEMPERATURE: 98 F

## 2018-01-15 PROCEDURE — 400013 HH SOC

## 2018-01-15 PROCEDURE — 3331090002 HH PPS REVENUE DEBIT

## 2018-01-15 PROCEDURE — G0151 HHCP-SERV OF PT,EA 15 MIN: HCPCS

## 2018-01-15 PROCEDURE — 3331090001 HH PPS REVENUE CREDIT

## 2018-01-16 PROCEDURE — 3331090001 HH PPS REVENUE CREDIT

## 2018-01-16 PROCEDURE — 3331090002 HH PPS REVENUE DEBIT

## 2018-01-17 PROCEDURE — 3331090002 HH PPS REVENUE DEBIT

## 2018-01-17 PROCEDURE — 3331090001 HH PPS REVENUE CREDIT

## 2018-01-18 ENCOUNTER — HOME CARE VISIT (OUTPATIENT)
Dept: SCHEDULING | Facility: HOME HEALTH | Age: 68
End: 2018-01-18
Payer: MEDICARE

## 2018-01-18 PROCEDURE — 3331090001 HH PPS REVENUE CREDIT

## 2018-01-18 PROCEDURE — G0157 HHC PT ASSISTANT EA 15: HCPCS

## 2018-01-18 PROCEDURE — 3331090002 HH PPS REVENUE DEBIT

## 2018-01-19 ENCOUNTER — HOME CARE VISIT (OUTPATIENT)
Dept: SCHEDULING | Facility: HOME HEALTH | Age: 68
End: 2018-01-19
Payer: MEDICARE

## 2018-01-19 VITALS
SYSTOLIC BLOOD PRESSURE: 138 MMHG | RESPIRATION RATE: 18 BRPM | TEMPERATURE: 97.7 F | DIASTOLIC BLOOD PRESSURE: 78 MMHG | HEART RATE: 108 BPM

## 2018-01-19 PROCEDURE — 3331090001 HH PPS REVENUE CREDIT

## 2018-01-19 PROCEDURE — G0151 HHCP-SERV OF PT,EA 15 MIN: HCPCS

## 2018-01-19 PROCEDURE — 3331090002 HH PPS REVENUE DEBIT

## 2018-01-20 VITALS
DIASTOLIC BLOOD PRESSURE: 74 MMHG | SYSTOLIC BLOOD PRESSURE: 126 MMHG | OXYGEN SATURATION: 98 % | RESPIRATION RATE: 15 BRPM | HEART RATE: 71 BPM

## 2018-01-20 PROCEDURE — 3331090001 HH PPS REVENUE CREDIT

## 2018-01-20 PROCEDURE — 3331090002 HH PPS REVENUE DEBIT

## 2018-01-21 PROCEDURE — 3331090002 HH PPS REVENUE DEBIT

## 2018-01-21 PROCEDURE — 3331090001 HH PPS REVENUE CREDIT

## 2018-01-22 ENCOUNTER — HOME CARE VISIT (OUTPATIENT)
Dept: SCHEDULING | Facility: HOME HEALTH | Age: 68
End: 2018-01-22
Payer: MEDICARE

## 2018-01-22 PROCEDURE — G0151 HHCP-SERV OF PT,EA 15 MIN: HCPCS

## 2018-01-22 PROCEDURE — 3331090001 HH PPS REVENUE CREDIT

## 2018-01-22 PROCEDURE — 3331090002 HH PPS REVENUE DEBIT

## 2018-01-23 ENCOUNTER — HOME CARE VISIT (OUTPATIENT)
Dept: HOME HEALTH SERVICES | Facility: HOME HEALTH | Age: 68
End: 2018-01-23
Payer: MEDICARE

## 2018-01-23 VITALS
DIASTOLIC BLOOD PRESSURE: 70 MMHG | RESPIRATION RATE: 15 BRPM | HEART RATE: 71 BPM | SYSTOLIC BLOOD PRESSURE: 124 MMHG | TEMPERATURE: 95.9 F

## 2018-01-23 PROCEDURE — 3331090001 HH PPS REVENUE CREDIT

## 2018-01-23 PROCEDURE — 3331090002 HH PPS REVENUE DEBIT

## 2018-01-23 PROCEDURE — G0151 HHCP-SERV OF PT,EA 15 MIN: HCPCS

## 2018-01-24 ENCOUNTER — HOSPITAL ENCOUNTER (OUTPATIENT)
Dept: ULTRASOUND IMAGING | Age: 68
Discharge: HOME | End: 2018-01-24
Attending: INTERNAL MEDICINE
Payer: MEDICARE

## 2018-01-24 VITALS
DIASTOLIC BLOOD PRESSURE: 72 MMHG | RESPIRATION RATE: 15 BRPM | TEMPERATURE: 97.1 F | HEART RATE: 81 BPM | SYSTOLIC BLOOD PRESSURE: 128 MMHG

## 2018-01-24 DIAGNOSIS — M79.605 PAIN IN BOTH LOWER EXTREMITIES: ICD-10-CM

## 2018-01-24 DIAGNOSIS — M79.604 PAIN IN BOTH LOWER EXTREMITIES: ICD-10-CM

## 2018-01-24 PROBLEM — R00.0 TACHYCARDIA: Status: ACTIVE | Noted: 2018-01-24

## 2018-01-24 PROCEDURE — 93970 EXTREMITY STUDY: CPT

## 2018-01-24 PROCEDURE — 3331090001 HH PPS REVENUE CREDIT

## 2018-01-24 PROCEDURE — 3331090002 HH PPS REVENUE DEBIT

## 2018-01-25 ENCOUNTER — HOME CARE VISIT (OUTPATIENT)
Dept: SCHEDULING | Facility: HOME HEALTH | Age: 68
End: 2018-01-25
Payer: MEDICARE

## 2018-01-25 VITALS — SYSTOLIC BLOOD PRESSURE: 126 MMHG | HEART RATE: 91 BPM | RESPIRATION RATE: 15 BRPM | DIASTOLIC BLOOD PRESSURE: 76 MMHG

## 2018-01-25 PROCEDURE — 3331090001 HH PPS REVENUE CREDIT

## 2018-01-25 PROCEDURE — 3331090002 HH PPS REVENUE DEBIT

## 2018-01-25 PROCEDURE — G0151 HHCP-SERV OF PT,EA 15 MIN: HCPCS

## 2018-01-26 PROCEDURE — 3331090002 HH PPS REVENUE DEBIT

## 2018-01-26 PROCEDURE — 3331090001 HH PPS REVENUE CREDIT

## 2018-01-27 PROCEDURE — 3331090002 HH PPS REVENUE DEBIT

## 2018-01-27 PROCEDURE — 3331090001 HH PPS REVENUE CREDIT

## 2018-01-28 PROCEDURE — 3331090001 HH PPS REVENUE CREDIT

## 2018-01-28 PROCEDURE — 3331090002 HH PPS REVENUE DEBIT

## 2018-01-29 PROCEDURE — 3331090001 HH PPS REVENUE CREDIT

## 2018-01-29 PROCEDURE — 3331090002 HH PPS REVENUE DEBIT

## 2018-01-30 ENCOUNTER — HOME CARE VISIT (OUTPATIENT)
Dept: SCHEDULING | Facility: HOME HEALTH | Age: 68
End: 2018-01-30
Payer: MEDICARE

## 2018-01-30 PROCEDURE — 3331090002 HH PPS REVENUE DEBIT

## 2018-01-30 PROCEDURE — 3331090001 HH PPS REVENUE CREDIT

## 2018-01-30 PROCEDURE — G0157 HHC PT ASSISTANT EA 15: HCPCS

## 2018-01-31 VITALS
DIASTOLIC BLOOD PRESSURE: 78 MMHG | SYSTOLIC BLOOD PRESSURE: 128 MMHG | RESPIRATION RATE: 18 BRPM | HEART RATE: 80 BPM | TEMPERATURE: 97.6 F

## 2018-01-31 PROCEDURE — 3331090001 HH PPS REVENUE CREDIT

## 2018-01-31 PROCEDURE — 3331090002 HH PPS REVENUE DEBIT

## 2018-02-01 ENCOUNTER — HOME CARE VISIT (OUTPATIENT)
Dept: HOME HEALTH SERVICES | Facility: HOME HEALTH | Age: 68
End: 2018-02-01
Payer: MEDICARE

## 2018-02-01 VITALS
SYSTOLIC BLOOD PRESSURE: 122 MMHG | HEART RATE: 88 BPM | DIASTOLIC BLOOD PRESSURE: 62 MMHG | RESPIRATION RATE: 18 BRPM | TEMPERATURE: 97.3 F

## 2018-02-01 PROCEDURE — G0157 HHC PT ASSISTANT EA 15: HCPCS

## 2018-02-01 PROCEDURE — 3331090001 HH PPS REVENUE CREDIT

## 2018-02-01 PROCEDURE — 3331090002 HH PPS REVENUE DEBIT

## 2018-02-02 PROCEDURE — 3331090001 HH PPS REVENUE CREDIT

## 2018-02-02 PROCEDURE — 3331090002 HH PPS REVENUE DEBIT

## 2018-02-03 PROCEDURE — 3331090002 HH PPS REVENUE DEBIT

## 2018-02-03 PROCEDURE — 3331090001 HH PPS REVENUE CREDIT

## 2018-02-04 PROCEDURE — 3331090002 HH PPS REVENUE DEBIT

## 2018-02-04 PROCEDURE — 3331090001 HH PPS REVENUE CREDIT

## 2018-02-05 PROCEDURE — 3331090002 HH PPS REVENUE DEBIT

## 2018-02-05 PROCEDURE — 3331090001 HH PPS REVENUE CREDIT

## 2018-02-06 PROCEDURE — 3331090002 HH PPS REVENUE DEBIT

## 2018-02-06 PROCEDURE — 3331090001 HH PPS REVENUE CREDIT

## 2018-02-07 ENCOUNTER — HOME CARE VISIT (OUTPATIENT)
Dept: SCHEDULING | Facility: HOME HEALTH | Age: 68
End: 2018-02-07
Payer: MEDICARE

## 2018-02-07 VITALS — HEART RATE: 87 BPM | DIASTOLIC BLOOD PRESSURE: 74 MMHG | RESPIRATION RATE: 16 BRPM | SYSTOLIC BLOOD PRESSURE: 136 MMHG

## 2018-02-07 PROCEDURE — 3331090001 HH PPS REVENUE CREDIT

## 2018-02-07 PROCEDURE — G0151 HHCP-SERV OF PT,EA 15 MIN: HCPCS

## 2018-02-07 PROCEDURE — 3331090002 HH PPS REVENUE DEBIT

## 2018-02-08 PROCEDURE — 3331090002 HH PPS REVENUE DEBIT

## 2018-02-08 PROCEDURE — 3331090001 HH PPS REVENUE CREDIT

## 2018-02-09 ENCOUNTER — HOME CARE VISIT (OUTPATIENT)
Dept: SCHEDULING | Facility: HOME HEALTH | Age: 68
End: 2018-02-09
Payer: MEDICARE

## 2018-02-09 VITALS — SYSTOLIC BLOOD PRESSURE: 136 MMHG | HEART RATE: 73 BPM | DIASTOLIC BLOOD PRESSURE: 74 MMHG

## 2018-02-09 PROCEDURE — 3331090001 HH PPS REVENUE CREDIT

## 2018-02-09 PROCEDURE — 3331090002 HH PPS REVENUE DEBIT

## 2018-02-09 PROCEDURE — G0151 HHCP-SERV OF PT,EA 15 MIN: HCPCS

## 2018-02-10 PROCEDURE — 3331090002 HH PPS REVENUE DEBIT

## 2018-02-10 PROCEDURE — 3331090001 HH PPS REVENUE CREDIT

## 2018-02-11 ENCOUNTER — HOSPITAL ENCOUNTER (OUTPATIENT)
Dept: SLEEP MEDICINE | Age: 68
Discharge: HOME | End: 2018-02-11
Payer: MEDICARE

## 2018-02-11 PROCEDURE — 3331090002 HH PPS REVENUE DEBIT

## 2018-02-11 PROCEDURE — 95810 POLYSOM 6/> YRS 4/> PARAM: CPT

## 2018-02-11 PROCEDURE — 3331090001 HH PPS REVENUE CREDIT

## 2018-02-12 PROCEDURE — 3331090001 HH PPS REVENUE CREDIT

## 2018-02-12 PROCEDURE — 3331090002 HH PPS REVENUE DEBIT

## 2018-02-13 PROCEDURE — 3331090001 HH PPS REVENUE CREDIT

## 2018-02-13 PROCEDURE — 3331090002 HH PPS REVENUE DEBIT

## 2018-02-14 PROCEDURE — 3331090001 HH PPS REVENUE CREDIT

## 2018-02-14 PROCEDURE — 3331090002 HH PPS REVENUE DEBIT

## 2018-02-15 PROCEDURE — 3331090002 HH PPS REVENUE DEBIT

## 2018-02-15 PROCEDURE — 3331090001 HH PPS REVENUE CREDIT

## 2018-02-16 PROCEDURE — 3331090001 HH PPS REVENUE CREDIT

## 2018-02-16 PROCEDURE — 3331090002 HH PPS REVENUE DEBIT

## 2018-02-17 PROCEDURE — 3331090002 HH PPS REVENUE DEBIT

## 2018-02-17 PROCEDURE — 3331090001 HH PPS REVENUE CREDIT

## 2018-02-25 ENCOUNTER — HOSPITAL ENCOUNTER (OUTPATIENT)
Dept: SLEEP MEDICINE | Age: 68
Discharge: HOME | End: 2018-02-25
Payer: MEDICARE

## 2018-02-25 PROCEDURE — 95811 POLYSOM 6/>YRS CPAP 4/> PARM: CPT

## 2018-02-26 PROBLEM — G47.33 OSA (OBSTRUCTIVE SLEEP APNEA): Status: ACTIVE | Noted: 2018-02-26

## 2018-04-25 ENCOUNTER — HOSPITAL ENCOUNTER (OUTPATIENT)
Dept: LAB | Age: 68
Discharge: HOME | End: 2018-04-25
Payer: MEDICARE

## 2018-04-25 LAB
BASOPHILS # BLD: 0 K/UL (ref 0–0.2)
BASOPHILS NFR BLD: 1 % (ref 0–2)
CRP SERPL-MCNC: <0.3 MG/DL (ref 0–0.9)
DIFFERENTIAL METHOD BLD: ABNORMAL
EOSINOPHIL # BLD: 0.1 K/UL (ref 0–0.8)
EOSINOPHIL NFR BLD: 2 % (ref 0.5–7.8)
ERYTHROCYTE [DISTWIDTH] IN BLOOD BY AUTOMATED COUNT: 14.7 % (ref 11.9–14.6)
ERYTHROCYTE [SEDIMENTATION RATE] IN BLOOD: 10 MM/HR (ref 0–20)
HCT VFR BLD AUTO: 45.3 % (ref 41.1–50.3)
HGB BLD-MCNC: 14.6 G/DL (ref 13.6–17.2)
IMM GRANULOCYTES # BLD: 0 K/UL (ref 0–0.5)
IMM GRANULOCYTES NFR BLD AUTO: 0 % (ref 0–5)
LYMPHOCYTES # BLD: 1.6 K/UL (ref 0.5–4.6)
LYMPHOCYTES NFR BLD: 28 % (ref 13–44)
MCH RBC QN AUTO: 27.9 PG (ref 26.1–32.9)
MCHC RBC AUTO-ENTMCNC: 32.2 G/DL (ref 31.4–35)
MCV RBC AUTO: 86.6 FL (ref 79.6–97.8)
MONOCYTES # BLD: 0.6 K/UL (ref 0.1–1.3)
MONOCYTES NFR BLD: 10 % (ref 4–12)
NEUTS SEG # BLD: 3.5 K/UL (ref 1.7–8.2)
NEUTS SEG NFR BLD: 59 % (ref 43–78)
PLATELET # BLD AUTO: 256 K/UL (ref 150–450)
PMV BLD AUTO: 10 FL (ref 10.8–14.1)
RBC # BLD AUTO: 5.23 M/UL (ref 4.23–5.67)
WBC # BLD AUTO: 5.8 K/UL (ref 4.3–11.1)

## 2018-04-25 PROCEDURE — 85652 RBC SED RATE AUTOMATED: CPT | Performed by: ORTHOPAEDIC SURGERY

## 2018-04-25 PROCEDURE — 85025 COMPLETE CBC W/AUTO DIFF WBC: CPT | Performed by: ORTHOPAEDIC SURGERY

## 2018-04-25 PROCEDURE — 36415 COLL VENOUS BLD VENIPUNCTURE: CPT | Performed by: ORTHOPAEDIC SURGERY

## 2018-04-25 PROCEDURE — 86140 C-REACTIVE PROTEIN: CPT | Performed by: ORTHOPAEDIC SURGERY

## 2018-08-23 PROBLEM — E53.8 VITAMIN B 12 DEFICIENCY: Status: ACTIVE | Noted: 2018-08-23

## 2018-08-29 PROBLEM — R53.82 CHRONIC FATIGUE: Status: ACTIVE | Noted: 2018-08-29

## 2018-08-29 PROBLEM — R06.02 SHORTNESS OF BREATH: Status: ACTIVE | Noted: 2018-08-29

## 2018-09-05 ENCOUNTER — HOSPITAL ENCOUNTER (OUTPATIENT)
Dept: LAB | Age: 68
Discharge: HOME | End: 2018-09-05
Payer: MEDICARE

## 2018-09-05 DIAGNOSIS — R53.82 CHRONIC FATIGUE: ICD-10-CM

## 2018-09-05 DIAGNOSIS — Z95.1 S/P CABG (CORONARY ARTERY BYPASS GRAFT): ICD-10-CM

## 2018-09-05 DIAGNOSIS — I25.10 CORONARY ARTERY DISEASE INVOLVING NATIVE CORONARY ARTERY OF NATIVE HEART WITHOUT ANGINA PECTORIS: Chronic | ICD-10-CM

## 2018-09-05 DIAGNOSIS — R06.02 SHORTNESS OF BREATH: ICD-10-CM

## 2018-09-05 LAB
ANION GAP SERPL CALC-SCNC: 7 MMOL/L (ref 7–16)
BASOPHILS # BLD: 0.1 K/UL (ref 0–0.2)
BASOPHILS NFR BLD: 1 % (ref 0–2)
BUN SERPL-MCNC: 21 MG/DL (ref 8–23)
CALCIUM SERPL-MCNC: 9.3 MG/DL (ref 8.3–10.4)
CHLORIDE SERPL-SCNC: 106 MMOL/L (ref 98–107)
CO2 SERPL-SCNC: 25 MMOL/L (ref 21–32)
CREAT SERPL-MCNC: 1 MG/DL (ref 0.8–1.5)
DIFFERENTIAL METHOD BLD: NORMAL
EOSINOPHIL # BLD: 0.1 K/UL (ref 0–0.8)
EOSINOPHIL NFR BLD: 2 % (ref 0.5–7.8)
ERYTHROCYTE [DISTWIDTH] IN BLOOD BY AUTOMATED COUNT: 14.1 %
GLUCOSE SERPL-MCNC: 105 MG/DL (ref 65–100)
HCT VFR BLD AUTO: 44.8 % (ref 41.1–50.3)
HGB BLD-MCNC: 14.3 G/DL (ref 13.6–17.2)
IMM GRANULOCYTES # BLD: 0 K/UL (ref 0–0.5)
IMM GRANULOCYTES NFR BLD AUTO: 0 % (ref 0–5)
INR PPP: 1
LYMPHOCYTES # BLD: 1.4 K/UL (ref 0.5–4.6)
LYMPHOCYTES NFR BLD: 23 % (ref 13–44)
MCH RBC QN AUTO: 29.7 PG (ref 26.1–32.9)
MCHC RBC AUTO-ENTMCNC: 31.9 G/DL (ref 31.4–35)
MCV RBC AUTO: 92.9 FL (ref 79.6–97.8)
MONOCYTES # BLD: 0.6 K/UL (ref 0.1–1.3)
MONOCYTES NFR BLD: 10 % (ref 4–12)
NEUTS SEG # BLD: 4 K/UL (ref 1.7–8.2)
NEUTS SEG NFR BLD: 64 % (ref 43–78)
NRBC # BLD: 0 K/UL (ref 0–0.2)
PLATELET # BLD AUTO: 258 K/UL (ref 150–450)
PMV BLD AUTO: 10.2 FL (ref 9.4–12.3)
POTASSIUM SERPL-SCNC: 4.5 MMOL/L (ref 3.5–5.1)
PROTHROMBIN TIME: 13.1 SEC (ref 11.5–14.5)
RBC # BLD AUTO: 4.82 M/UL (ref 4.23–5.6)
SODIUM SERPL-SCNC: 138 MMOL/L (ref 136–145)
WBC # BLD AUTO: 6.3 K/UL (ref 4.3–11.1)

## 2018-09-05 PROCEDURE — 85025 COMPLETE CBC W/AUTO DIFF WBC: CPT

## 2018-09-05 PROCEDURE — 80048 BASIC METABOLIC PNL TOTAL CA: CPT

## 2018-09-05 PROCEDURE — 36415 COLL VENOUS BLD VENIPUNCTURE: CPT

## 2018-09-05 PROCEDURE — 85610 PROTHROMBIN TIME: CPT

## 2018-09-10 ENCOUNTER — HOSPITAL ENCOUNTER (OUTPATIENT)
Dept: CARDIAC CATH/INVASIVE PROCEDURES | Age: 68
Discharge: HOME | End: 2018-09-10
Attending: INTERNAL MEDICINE | Admitting: INTERNAL MEDICINE
Payer: MEDICARE

## 2018-09-10 VITALS
OXYGEN SATURATION: 93 % | HEART RATE: 69 BPM | BODY MASS INDEX: 28.93 KG/M2 | RESPIRATION RATE: 16 BRPM | HEIGHT: 66 IN | TEMPERATURE: 98 F | WEIGHT: 180 LBS | SYSTOLIC BLOOD PRESSURE: 127 MMHG | DIASTOLIC BLOOD PRESSURE: 71 MMHG

## 2018-09-10 LAB
ATRIAL RATE: 65 BPM
CALCULATED P AXIS, ECG09: 5 DEGREES
CALCULATED R AXIS, ECG10: 20 DEGREES
CALCULATED T AXIS, ECG11: 65 DEGREES
DIAGNOSIS, 93000: NORMAL
P-R INTERVAL, ECG05: 174 MS
Q-T INTERVAL, ECG07: 392 MS
QRS DURATION, ECG06: 96 MS
QTC CALCULATION (BEZET), ECG08: 407 MS
VENTRICULAR RATE, ECG03: 65 BPM

## 2018-09-10 PROCEDURE — 93459 L HRT ART/GRFT ANGIO: CPT

## 2018-09-10 PROCEDURE — 99152 MOD SED SAME PHYS/QHP 5/>YRS: CPT

## 2018-09-10 PROCEDURE — 74011250636 HC RX REV CODE- 250/636: Performed by: INTERNAL MEDICINE

## 2018-09-10 PROCEDURE — 74011250637 HC RX REV CODE- 250/637: Performed by: INTERNAL MEDICINE

## 2018-09-10 PROCEDURE — 99153 MOD SED SAME PHYS/QHP EA: CPT

## 2018-09-10 PROCEDURE — 77030004559 HC CATH ANGI DX SUPT CARD -B

## 2018-09-10 PROCEDURE — 77030004558 HC CATH ANGI DX SUPR TORQ CARD -A

## 2018-09-10 PROCEDURE — C1894 INTRO/SHEATH, NON-LASER: HCPCS

## 2018-09-10 PROCEDURE — 93005 ELECTROCARDIOGRAM TRACING: CPT | Performed by: INTERNAL MEDICINE

## 2018-09-10 PROCEDURE — 93458 L HRT ARTERY/VENTRICLE ANGIO: CPT

## 2018-09-10 PROCEDURE — 74011250636 HC RX REV CODE- 250/636

## 2018-09-10 PROCEDURE — 74011636320 HC RX REV CODE- 636/320: Performed by: INTERNAL MEDICINE

## 2018-09-10 RX ORDER — FENTANYL CITRATE 50 UG/ML
25-50 INJECTION, SOLUTION INTRAMUSCULAR; INTRAVENOUS
Status: DISCONTINUED | OUTPATIENT
Start: 2018-09-10 | End: 2018-09-10 | Stop reason: HOSPADM

## 2018-09-10 RX ORDER — DIAZEPAM 5 MG/1
5 TABLET ORAL ONCE
Status: COMPLETED | OUTPATIENT
Start: 2018-09-10 | End: 2018-09-10

## 2018-09-10 RX ORDER — SODIUM CHLORIDE 9 MG/ML
75 INJECTION, SOLUTION INTRAVENOUS CONTINUOUS
Status: DISCONTINUED | OUTPATIENT
Start: 2018-09-10 | End: 2018-09-10 | Stop reason: HOSPADM

## 2018-09-10 RX ORDER — MIDAZOLAM HYDROCHLORIDE 1 MG/ML
.5-2 INJECTION, SOLUTION INTRAMUSCULAR; INTRAVENOUS
Status: DISCONTINUED | OUTPATIENT
Start: 2018-09-10 | End: 2018-09-10 | Stop reason: HOSPADM

## 2018-09-10 RX ORDER — SODIUM CHLORIDE 0.9 % (FLUSH) 0.9 %
5-10 SYRINGE (ML) INJECTION EVERY 8 HOURS
Status: DISCONTINUED | OUTPATIENT
Start: 2018-09-10 | End: 2018-09-10 | Stop reason: HOSPADM

## 2018-09-10 RX ORDER — SODIUM CHLORIDE 0.9 % (FLUSH) 0.9 %
5-10 SYRINGE (ML) INJECTION AS NEEDED
Status: DISCONTINUED | OUTPATIENT
Start: 2018-09-10 | End: 2018-09-10 | Stop reason: HOSPADM

## 2018-09-10 RX ORDER — GUAIFENESIN 100 MG/5ML
81-324 LIQUID (ML) ORAL ONCE
Status: COMPLETED | OUTPATIENT
Start: 2018-09-10 | End: 2018-09-10

## 2018-09-10 RX ORDER — HEPARIN SODIUM 200 [USP'U]/100ML
3 INJECTION, SOLUTION INTRAVENOUS CONTINUOUS
Status: DISCONTINUED | OUTPATIENT
Start: 2018-09-10 | End: 2018-09-10 | Stop reason: HOSPADM

## 2018-09-10 RX ORDER — LIDOCAINE HYDROCHLORIDE 10 MG/ML
10-15 INJECTION INFILTRATION; PERINEURAL ONCE
Status: COMPLETED | OUTPATIENT
Start: 2018-09-10 | End: 2018-09-10

## 2018-09-10 RX ADMIN — MIDAZOLAM HYDROCHLORIDE 1 MG: 1 INJECTION, SOLUTION INTRAMUSCULAR; INTRAVENOUS at 11:16

## 2018-09-10 RX ADMIN — FENTANYL CITRATE 25 MCG: 50 INJECTION, SOLUTION INTRAMUSCULAR; INTRAVENOUS at 11:23

## 2018-09-10 RX ADMIN — MIDAZOLAM HYDROCHLORIDE 1 MG: 1 INJECTION, SOLUTION INTRAMUSCULAR; INTRAVENOUS at 11:23

## 2018-09-10 RX ADMIN — FENTANYL CITRATE 25 MCG: 50 INJECTION, SOLUTION INTRAMUSCULAR; INTRAVENOUS at 11:16

## 2018-09-10 RX ADMIN — LIDOCAINE HYDROCHLORIDE 10 ML: 10 INJECTION, SOLUTION INFILTRATION; PERINEURAL at 11:27

## 2018-09-10 RX ADMIN — HEPARIN SODIUM 3 ML/HR: 5000 INJECTION, SOLUTION INTRAVENOUS; SUBCUTANEOUS at 11:16

## 2018-09-10 RX ADMIN — DIAZEPAM 5 MG: 5 TABLET ORAL at 09:56

## 2018-09-10 RX ADMIN — ASPIRIN 81 MG 243 MG: 81 TABLET ORAL at 09:55

## 2018-09-10 RX ADMIN — SODIUM CHLORIDE 75 ML/HR: 900 INJECTION, SOLUTION INTRAVENOUS at 09:52

## 2018-09-10 RX ADMIN — IOPAMIDOL 150 ML: 755 INJECTION, SOLUTION INTRAVENOUS at 11:44

## 2018-09-10 NOTE — PROCEDURES
[id number] [facility] CATH    Raymond Muro  MR#: [id number]  : 1950  ACCOUNT #: [de-identified]   DATE OF SERVICE: 09/10/2018    REFERRING PHYSICIAN:  Steve Hampton III     PRIMARY CARDIOLOGIST:  Zoran Wigigns MD    REASON FOR THE PROCEDURE:  Recurrent worsening exertional dyspnea and chronic fatigue in this 41-year-old gentleman with a history of 3-vessel bypass grafting consisting of a LIMA to the LAD and separate veins to the distal right coronary artery and to the second circumflex branch who also has been intolerant to and not using CPAP despite severe sleep apnea in the past.    PROCEDURES PERFORMED:  Left heart catheterization with coronary angiography and left ventriculogram.    TOTAL CONTRAST:  150 mL of Isovue. CONSCIOUS SEDATION:  The patient was sedated by Marie Bell with 2 mg of Versed, 50 mcg of fentanyl and monitored from 11:18-11:42 a.m. FRAILTY SCORE: 3    PROCEDURE TECHNIQUE:  After informed consent was obtained, the patient was brought to the cath lab, prepped and draped in the usual fashion. A 6-Mongolian sheath was placed in the right femoral artery by the modified Seldinger technique. Left heart catheterization performed using standard 6-Mongolian angled pigtail and JL4 and JR4 catheters. We used the JR4 catheter to engage all the bypass grafts. Manual pressure will be applied to the patient's access site via protocol. There were no complications. PRESSURE RESULTS:  Aorta 135/64. Left ventricle 135/5-10. Left ventriculogram reveals normal left ventricular regional wall motion with ejection fraction greater than 55%. There is no mitral regurgitation and there is no aortic valve gradient on catheter pullback. Left ventricular end-diastolic pressure is normal.    CORONARY ANATOMY:  The left main is small and diseased in a tubular fashion from the ostium through the bifurcation.   The vessel was very small and divides into a circumflex and an LAD in the usual fashion. The circumflex is ostially occluded. The LAD is small caliber and 2-2.25 mm in diameter proximally with tubular moderate disease up to 50%. It is completely occluded after the takeoff of a small caliber bifurcating diagonal branch with both bifurcation branches less than 2 mm in diameter. The right coronary artery is proximally subtotally occluded with a 99% lesion with faint distal filling of the mid RCA. BYPASS GRAFT ANGIOGRAPHY:  The saphenous vein graft to the circumflex is widely patent with a clear anastomosis to a moderate caliber terminal circumflex with distal mild luminal irregularities in the native vessel. There is also retrograde flow filling a second obtuse marginal branch which also has mild irregularity. The saphenous vein graft to the right coronary is widely patent with minimal graft irregularities and a clear anastomosis to the distal right coronary. There is good distal runoff into minimally irregular, but small caliber native posterior descending and posterolateral branches. There is no target for intervention. The left internal mammary to the LAD is widely patent with a clear anastomosis to the mid LAD and good distal runoff around the apex and minimally irregular native vessel. The anastomosis is widely patent. CONCLUSIONS:    1. Three out of 3 patent bypass grafts. 2.  Normal left ventricular regional wall motion and ejection fraction. 3.  The patient's fatigue may be due to persistent sleep apnea with intolerance to CPAP mask. RECOMMENDATIONS:  We will ask [facility] to reassess for potential nasal pillows or alternative therapies given his chronic worsening fatigue.   In addition, he does have small vessel disease and given his marginal blood pressure, we will add Ranexa 500 mg twice daily for a week and then prescribed 1000 mg twice daily to see if this has any improvement in abating his symptoms in the next couple of weeks. Further recommendation will be forthcoming. MD OBEY Brown / Robert Vogt  D: 09/10/2018 11:59     T: 09/10/2018 13:32  JOB #: [id number]  CC: Pierre De La Cruz III, MD  CC: VALE CHANG MD

## 2018-09-10 NOTE — PROGRESS NOTES
Assisted to ambulate up and down hallway. Right groin site remained soft with no swelling or bleeding noted. Discharge instructions given. Wife at bedside.

## 2018-09-10 NOTE — DISCHARGE INSTRUCTIONS
HEART CATHETERIZATION/ANGIOGRAPHY DISCHARGE INSTRUCTIONS    1. Check puncture site frequently for swelling or bleeding. If there is any bleeding, lie down and apply pressure over the area with a clean towel or washcloth. Notify your doctor for any redness, swelling, drainage, or oozing from the puncture site. Notify your doctor for any fever or chills. 2. If the extremity becomes cold, numb, or painful call [facility] at [phone].  3. Activity should be limited for the next 48 hours. Climb stairs as little as possible and avoid any stooping, bending, or strenuous activity for 48 hours. No heavy lifting (anything over 10 pounds) for 3 days. 4. You may resume your usual diet. Drink more fluids than usual.  5. Have a responsible person drive you home and stay with you for at least 24 hours after your heart catheterization/angiography. 6. You may remove bandage from your Right groin in 24 hours. You may shower in 24 hours. No tub baths, hot tubs, or swimming for 1 week. Do not place any lotions, creams, powders, or ointments over puncture site for 1 week. You may place a clean band-aid over the puncture site each day for 5 days. Change daily. I have read the above instructions and have had the opportunity to ask questions.       Patient: ________________________   Date: 9/10/2018    Witness: _______________________   Date: 9/10/2018

## 2018-09-10 NOTE — PROGRESS NOTES
Patient received to [facility] room # 3  Ambulatory from [facility]. Patient scheduled for [facility] today with Dr Delio Tsai. Procedure reviewed & questions answered, voiced good understanding consent obtained & placed on chart. All medications and medical history reviewed. Will prep patient per orders. Patient & family updated on plan of care. The patient has a fraility score of 3-MANAGING WELL, based on patient A&Ox3, patient able to perform ADL's independently, patient amble to ambulate to prep room without difficulty.

## 2018-09-10 NOTE — PROCEDURES
Brief Cardiac Procedure Note    Patient: Dustin Henson MRN: [id number]  SSN: [id number]    YOB: 1950  Age: 80 y.o. Sex: male      Date of Procedure: 9/10/2018     Pre-procedure Diagnosis: Typical Angina    Post-procedure Diagnosis: Coronary Artery Disease    Reason for Procedure: Suspected CAD    Procedure: Left Heart Catheterization    Brief Description of Procedure: LHC    Performed By: Mickeal Closs, MD     Assistants: NONE    Anesthesia: Moderate Sedation    Estimated Blood Loss: Less than 10 mL      Specimens: None    Implants: None    Findings:   LV 55%, EDP 10, NO MR OR AV GRAD  LMCA OSTIAL 50%, TUBULAR DISEASE DIFFUSELY INTO SMALL <2MM BIFURCATING DIAGONAL, TOO SMALL FOR PCI  RCA PROX 99%    SVG TO RCA PATENT WITH GOOD RUNOFF  SVG TO LCX PATENT WITH GOOD RUNOFF  LIMA TO LAD PATENT WITH GOOD RUNOFF    RIGHT GROIN MANUAL S/P HIP REPLACEMENT    Complications: None    Recommendations: Continue medical therapy.  ADD RANEXA BID AND CONSULT PULM FOR CPAP THERAPY    Signed By: Mickeal Closs, MD     September 10, 2018

## 2018-09-10 NOTE — IP AVS SNAPSHOT
[address] 
[phone] Patient: Rom Chandra MRN: [id number] ZBH:9/9/0516 Discharge Summary 9/10/2018 Rom Chandra MRN[de-identified]  [id number] Admission Information Provider Pager Service Admission Date Expected D/C Date Alysia Meza MD  CARDIAC CATH LAB 9/10/2018 Actual LOS Patient Class 0 days OUTPATIENT Follow-up Information Follow up With Details Comments Contact Info Alysia Meza MD On 10/18/2018 Follow-up in the [facility] office @ [place] [address] 
[phone] My Medications ASK your physician about these medications Instructions Each Dose to Equal  
 Morning Noon Evening Bedtime  
 albuterol 90 mcg/actuation inhaler Commonly known as:  PROVENTIL HFA, VENTOLIN HFA, PROAIR HFA Your last dose was: Your next dose is: Take 1 Puff by inhalation every six (6) hours as needed for Wheezing. 1 Puff  
    
   
   
   
  
 aspirin delayed-release 81 mg tablet Your last dose was: Your next dose is: Take  by mouth daily. cyanocobalamin 1,000 mcg tablet Your last dose was: Your next dose is: Take 1,000 mcg by mouth daily. 1000 mcg  
    
   
   
   
  
 ibuprofen 800 mg tablet Commonly known as:  MOTRIN Your last dose was: Your next dose is: Take 800 mg by mouth every six (6) hours as needed for Pain. 800 mg  
    
   
   
   
  
 lisinopril 10 mg tablet Commonly known as:  Fox Landaverdeman Your last dose was: Your next dose is: Take 1 Tab by mouth daily. 10 mg METOPROLOL SUCCINATE PO Your last dose was: Your next dose is: Take 25 mg by mouth daily. Indications: take 1/2 tab daily 25 mg  
    
   
   
   
  
 nitroglycerin 0.3 mg SL tablet Commonly known as:  NITROSTAT Your last dose was: Your next dose is:    
   
   
 1 Tab by SubLINGual route every five (5) minutes as needed for Chest Pain. Indications: Angina 0.3 mg  
    
   
   
   
  
 omeprazole 40 mg capsule Commonly known as:  PRILOSEC Your last dose was: Your next dose is: Take 1 Cap by mouth every morning. Indications: GASTROESOPHAGEAL REFLUX  
 40 mg  
    
   
   
   
  
 rosuvastatin 20 mg tablet Commonly known as:  CRESTOR Your last dose was: Your next dose is: Take 1 Tab by mouth every morning. Indications: hypercholesterolemia 20 mg  
    
   
   
   
  
 tamsulosin 0.4 mg capsule Commonly known as:  FLOMAX Your last dose was: Your next dose is: Take 1 Cap by mouth nightly. Indications: BENIGN PROSTATIC HYPERPLASIA  
 0.4 mg  
    
   
   
   
  
 TYLENOL 325 mg tablet Generic drug:  acetaminophen Your last dose was: Your next dose is: Take  by mouth every four (4) hours as needed for Pain. ZyrTEC 10 mg tablet Generic drug:  cetirizine Your last dose was: Your next dose is: Take  by mouth. General Information Please provide this summary of care documentation to your next provider. Allergies High:  Codeine Current Immunizations  Reviewed on 7/26/2017 Name Date Influenza Vaccine 12/13/2016, 11/1/2015, 10/3/2013 Influenza Vaccine (Quad) PF 1/13/2018, 9/21/2015 Pneumococcal Conjugate (PCV-13) 3/21/2017 Pneumococcal Polysaccharide (PPSV-23) 12/16/2015 TB Skin Test (PPD) Intradermal 1/12/2018, 9/16/2015, 3/20/2014 Td 10/8/2014 Discharge Instructions Discharge Instructions HEART CATHETERIZATION/ANGIOGRAPHY DISCHARGE INSTRUCTIONS 1. Check puncture site frequently for swelling or bleeding. If there is any bleeding, lie down and apply pressure over the area with a clean towel or washcloth. Notify your doctor for any redness, swelling, drainage, or oozing from the puncture site. Notify your doctor for any fever or chills. 2. If the extremity becomes cold, numb, or painful call [address] Cardiology at [phone]. 
3. Activity should be limited for the next 48 hours. Climb stairs as little as possible and avoid any stooping, bending, or strenuous activity for 48 hours. No heavy lifting (anything over 10 pounds) for 3 days. 4. You may resume your usual diet. Drink more fluids than usual. 
5. Have a responsible person drive you home and stay with you for at least 24 hours after your heart catheterization/angiography. 6. You may remove bandage from your Right groin in 24 hours. You may shower in 24 hours. No tub baths, hot tubs, or swimming for 1 week. Do not place any lotions, creams, powders, or ointments over puncture site for 1 week. You may place a clean band-aid over the puncture site each day for 5 days. Change daily. I have read the above instructions and have had the opportunity to ask questions. Patient: ________________________   Date: 9/10/2018 Witness: _______________________   Date: 9/10/2018 Discharge Orders None  
  
` Patient Signature:  ____________________________________________________________ Date:  ____________________________________________________________  
  
 Eliza Boudreaux Provider Signature:  ____________________________________________________________ Date:  ____________________________________________________________

## 2018-09-10 NOTE — PROGRESS NOTES
TRANSFER - OUT REPORT: 
 
R femoral diagnostic [facility] with Dr Lucia Raza Versed 2 mg Fentanyl 50 mcg 6 fr sheath in right groin covered with tegaderm No bleeding or hematoma noted at site. Site soft Verbal report given to Renae(name) presley Beckman  being transferred to CPRU(unit) for routine progression of care Report consisted of patients Situation, Background, Assessment and  
Recommendations(SBAR). Information from the following report(s) Procedure Summary was reviewed with the receiving nurse. Lines:  
Peripheral IV 09/10/18 Left Hand (Active) Opportunity for questions and clarification was provided. Patient transported with: 
 Registered Nurse

## 2018-09-10 NOTE — PROGRESS NOTES
6 FR arterial sheath removed from right groin using sterile technique. Manual pressure held over site for 20 minutes. Hemostasis achieved at 1230. Right groin without bleeding without hematoma. Sterile gauze placed over site and secured with tegaderm. 5lb sandbag placed over site. Patient instructed to keep right leg straight and still and head on pillow. Patient verbalizes understanding.

## 2018-09-10 NOTE — PROGRESS NOTES
Report received from [address] [place] [address]. Procedural findings communicated. Intra procedural  medication administration reviewed. Progression of care discussed. Patient received into [id number] [facility] 4 post sheath removal.  
 
Access site without bleeding or swelling yes Dressing dry and intact yes Patient instructed to limit movement to right lower extremity Routine post procedural vital signs and site assessment initiated yes

## 2018-11-15 ENCOUNTER — HOSPITAL ENCOUNTER (OUTPATIENT)
Dept: GENERAL RADIOLOGY | Age: 68
Discharge: HOME | End: 2018-11-15
Payer: MEDICARE

## 2018-11-15 DIAGNOSIS — R06.02 SOB (SHORTNESS OF BREATH): ICD-10-CM

## 2018-11-15 PROCEDURE — 71046 X-RAY EXAM CHEST 2 VIEWS: CPT

## 2018-12-21 ENCOUNTER — HOSPITAL ENCOUNTER (OUTPATIENT)
Dept: CARDIAC REHAB | Age: 68
Discharge: HOME | End: 2018-12-21

## 2018-12-21 NOTE — CARDIO/PULMONARY
2018      Dear Deborah Barraza: Thank you for referring your patient, Catrina Mattson (: 1950), to the Pulmonary Rehabilitation Program at [facility]. Mr. Clayton Perea is a good candidate for the program and should see improvements with regular participation. We will be working to increase your patient's endurance and self care over the next 12 weeks. We will contact you with any issues or concerns that may arise, or you can follow your patient's progress through [facility] at any time. We will send you a final summary report when the program is completed. Again, thank you for your referral. If we can be of further assistance, please feel free to contact the Cardiopulmonary Rehab staff at [phone].     Sincerely,      Winnie Small, RRT, RCP  Pulmonary Rehab Specialist   [facility]    CC: File

## 2019-01-10 ENCOUNTER — HOSPITAL ENCOUNTER (OUTPATIENT)
Dept: CARDIAC REHAB | Age: 69
Discharge: HOME | End: 2019-01-10
Payer: MEDICARE

## 2019-01-10 VITALS — HEIGHT: 66 IN | WEIGHT: 187.5 LBS | BODY MASS INDEX: 30.13 KG/M2

## 2019-01-10 PROCEDURE — G0239 OTH RESP PROC, GROUP: HCPCS

## 2019-01-15 ENCOUNTER — HOSPITAL ENCOUNTER (OUTPATIENT)
Dept: CARDIAC REHAB | Age: 69
Discharge: HOME | End: 2019-01-15
Payer: MEDICARE

## 2019-01-15 VITALS — BODY MASS INDEX: 30.21 KG/M2 | WEIGHT: 187.2 LBS

## 2019-01-15 PROCEDURE — G0239 OTH RESP PROC, GROUP: HCPCS

## 2019-01-17 ENCOUNTER — HOSPITAL ENCOUNTER (OUTPATIENT)
Dept: CARDIAC REHAB | Age: 69
Discharge: HOME | End: 2019-01-17
Payer: MEDICARE

## 2019-01-17 PROCEDURE — G0239 OTH RESP PROC, GROUP: HCPCS

## 2019-01-22 ENCOUNTER — HOSPITAL ENCOUNTER (OUTPATIENT)
Dept: CARDIAC REHAB | Age: 69
Discharge: HOME | End: 2019-01-22
Payer: MEDICARE

## 2019-01-22 ENCOUNTER — HOSPITAL ENCOUNTER (OUTPATIENT)
Dept: SLEEP MEDICINE | Age: 69
Discharge: HOME | End: 2019-01-22
Payer: MEDICARE

## 2019-01-22 VITALS — BODY MASS INDEX: 29.86 KG/M2 | WEIGHT: 185 LBS

## 2019-01-22 PROCEDURE — G0239 OTH RESP PROC, GROUP: HCPCS

## 2019-01-22 PROCEDURE — 95810 POLYSOM 6/> YRS 4/> PARAM: CPT

## 2019-01-24 ENCOUNTER — APPOINTMENT (OUTPATIENT)
Dept: CARDIAC REHAB | Age: 69
End: 2019-01-24
Payer: MEDICARE

## 2019-01-29 ENCOUNTER — HOSPITAL ENCOUNTER (OUTPATIENT)
Dept: CARDIAC REHAB | Age: 69
Discharge: HOME | End: 2019-01-29
Payer: MEDICARE

## 2019-01-29 VITALS — WEIGHT: 183 LBS | BODY MASS INDEX: 29.54 KG/M2

## 2019-01-29 PROCEDURE — G0239 OTH RESP PROC, GROUP: HCPCS

## 2019-01-31 ENCOUNTER — HOSPITAL ENCOUNTER (OUTPATIENT)
Dept: CARDIAC REHAB | Age: 69
Discharge: HOME | End: 2019-01-31
Payer: MEDICARE

## 2019-01-31 PROCEDURE — G0239 OTH RESP PROC, GROUP: HCPCS

## 2019-02-05 ENCOUNTER — HOSPITAL ENCOUNTER (OUTPATIENT)
Dept: CARDIAC REHAB | Age: 69
Discharge: HOME | End: 2019-02-05
Payer: MEDICARE

## 2019-02-05 VITALS — WEIGHT: 182.8 LBS | BODY MASS INDEX: 29.5 KG/M2

## 2019-02-05 PROCEDURE — G0239 OTH RESP PROC, GROUP: HCPCS

## 2019-02-05 NOTE — PROGRESS NOTES
80year old male with emphysema enrolled in pulmonary rehabilitation seen today for initial nutrition counseling. Good energy and fair appetite today. Medical History: 
Past Medical History:  
Diagnosis Date  Abnormal cardiovascular function study 1/26/2016  Adenoma of left adrenal gland  Arthritis   
 general  
 BPH (benign prostatic hyperplasia)  Chest discomfort 1/26/2016  Chronic kidney disease  Chronic low back pain  Chronic pain   
 back, hip  COPD (chronic obstructive pulmonary disease) (HCC) mild per pt; no rescue inhaler; denies SOB with 1 flight of steps  Coronary artery disease   
 had CABG 9/14/15 s/p failed stress test  
 ED (erectile dysfunction)  GERD (gastroesophageal reflux disease)   
 controlled with med  History of basal cell cancer  Hypertension   
 controlled with med  Left atrial dilation 10/2016  LVH (left ventricular hypertrophy) 02/2018  Mitral valve regurgitation 10/2016  
 1+  Mixed hyperlipidemia  Murmur, cardiac   
 soft 1/6 MR murmur per cardiologist note 11-10-17 : per echo 10-24-16 \"mild mitral regurg\"  Nausea & vomiting   
 responds to IV antiemetic  Palpitations  Pleural effusion exudative 9/30/15  
 t-cent on left  Prostatic hypertrophy, benign 1/26/2016  Sleep apnea   
 pt reports mild- no c-pap  Status post right hip replacement 1/12/2018  Tricuspid valvular regurgitation Nutrition related medications/supplements : steroid inhaler, B12 (r/t b-12 deficiency), lisinopril, omeprozole Nutrition Related Labs: reviewed, 8/29/18 GFR >60, ,  H, HDL 47, LDL 53. Social History: retired, lives with wife. Enjoys camping with his wife. Food and Nutrition Intake History:  
Has started making lifestyle changes over the past 2 months, increasing PA to 5x most days, decreasing the amount of SF in diet. Does not always feel like eating. Breakfast: bowel of cheerios with 1% or skim milk, 8-9 cups of coffee with creamer. Lunch: [place] [employer] [place] on ww bread, chips and sweet tea. Supper: Air fried chicken, macaroni salad, baked beans and sweet tea. Rarely snacks, occasionally will eat an orange or apple. 0-1 cups of F/V per day. Alcohol use: see cardiac ITP 
 
GI: GERD- on medication. Anthropometric Data: 
Ht: 66inches Wt: 183 #, (down from 187# from start of program), IBW= 142# BMI: Estimated body mass index is 29.54 kg/m² as calculated from the following: 
  Height as of 1/10/19: 5' 6\" (1.676 m). Weight as of 1/29/19: 83 kg (183 lb). Waist measurement (inches): 42.5 Estimated Nutritional Needs: 
Calories:8015-9265/day Protein:  1.2-1.5g IBW/day: 77-97g/day Fluids: 1ml/kcal unless restricted by MD 
 
 
States Nutrition Goals: increase fluids and protein intake Stage of Behavior Change: preperation Nutrition Diagnosis: Inadequate intake of nutrients, r/t food choices evidenced by food/intake history. Nutrition Intervention: 
Reviewed added sugar intake, my plate with fluids intake and quality protein intake needs. Handouts: Protein foods, COPD handout Nutrition Goals: increase fluids, increase protein intake Monitoring/Evaluation: RD to follow up with participant during rehab rehab sessions for questions and assessment of progression toward goals. Peggy Chavarria, MS, RD LD 
W: [phone]

## 2019-02-07 ENCOUNTER — HOSPITAL ENCOUNTER (OUTPATIENT)
Dept: CARDIAC REHAB | Age: 69
Discharge: HOME | End: 2019-02-07
Payer: MEDICARE

## 2019-02-07 PROCEDURE — G0239 OTH RESP PROC, GROUP: HCPCS

## 2019-02-11 ENCOUNTER — HOSPITAL ENCOUNTER (OUTPATIENT)
Dept: GENERAL RADIOLOGY | Age: 69
Discharge: HOME | End: 2019-02-11

## 2019-02-11 DIAGNOSIS — R05.8 PRODUCTIVE COUGH: ICD-10-CM

## 2019-02-12 ENCOUNTER — APPOINTMENT (OUTPATIENT)
Dept: CARDIAC REHAB | Age: 69
End: 2019-02-12
Payer: MEDICARE

## 2019-02-14 ENCOUNTER — HOSPITAL ENCOUNTER (OUTPATIENT)
Dept: CARDIAC REHAB | Age: 69
End: 2019-02-14
Payer: MEDICARE

## 2019-02-19 ENCOUNTER — HOSPITAL ENCOUNTER (OUTPATIENT)
Dept: CARDIAC REHAB | Age: 69
Discharge: HOME | End: 2019-02-19
Payer: MEDICARE

## 2019-02-19 VITALS — WEIGHT: 183 LBS | BODY MASS INDEX: 29.54 KG/M2

## 2019-02-19 PROCEDURE — G0239 OTH RESP PROC, GROUP: HCPCS

## 2019-02-21 ENCOUNTER — HOSPITAL ENCOUNTER (OUTPATIENT)
Dept: CARDIAC REHAB | Age: 69
Discharge: HOME | End: 2019-02-21
Payer: MEDICARE

## 2019-02-21 PROCEDURE — G0239 OTH RESP PROC, GROUP: HCPCS

## 2019-02-26 ENCOUNTER — HOSPITAL ENCOUNTER (OUTPATIENT)
Dept: CARDIAC REHAB | Age: 69
Discharge: HOME | End: 2019-02-26
Payer: MEDICARE

## 2019-02-26 VITALS — WEIGHT: 183.6 LBS | BODY MASS INDEX: 29.63 KG/M2

## 2019-02-26 PROCEDURE — G0239 OTH RESP PROC, GROUP: HCPCS

## 2019-02-28 ENCOUNTER — HOSPITAL ENCOUNTER (OUTPATIENT)
Dept: CARDIAC REHAB | Age: 69
Discharge: HOME | End: 2019-02-28
Payer: MEDICARE

## 2019-02-28 PROCEDURE — G0239 OTH RESP PROC, GROUP: HCPCS

## 2019-03-05 ENCOUNTER — APPOINTMENT (OUTPATIENT)
Dept: CARDIAC REHAB | Age: 69
End: 2019-03-05

## 2019-03-07 ENCOUNTER — APPOINTMENT (OUTPATIENT)
Dept: CARDIAC REHAB | Age: 69
End: 2019-03-07

## 2019-03-12 ENCOUNTER — APPOINTMENT (OUTPATIENT)
Dept: CARDIAC REHAB | Age: 69
End: 2019-03-12

## 2019-03-14 ENCOUNTER — APPOINTMENT (OUTPATIENT)
Dept: CARDIAC REHAB | Age: 69
End: 2019-03-14

## 2019-03-19 ENCOUNTER — APPOINTMENT (OUTPATIENT)
Dept: CARDIAC REHAB | Age: 69
End: 2019-03-19

## 2019-03-21 ENCOUNTER — APPOINTMENT (OUTPATIENT)
Dept: CARDIAC REHAB | Age: 69
End: 2019-03-21

## 2019-03-26 ENCOUNTER — APPOINTMENT (OUTPATIENT)
Dept: CARDIAC REHAB | Age: 69
End: 2019-03-26

## 2019-03-28 ENCOUNTER — APPOINTMENT (OUTPATIENT)
Dept: CARDIAC REHAB | Age: 69
End: 2019-03-28

## 2019-04-02 ENCOUNTER — APPOINTMENT (OUTPATIENT)
Dept: CARDIAC REHAB | Age: 69
End: 2019-04-02

## 2019-04-04 ENCOUNTER — APPOINTMENT (OUTPATIENT)
Dept: CARDIAC REHAB | Age: 69
End: 2019-04-04

## 2019-06-05 ENCOUNTER — HOSPITAL ENCOUNTER (OUTPATIENT)
Dept: CT IMAGING | Age: 69
Discharge: HOME | End: 2019-06-05
Attending: INTERNAL MEDICINE
Payer: MEDICARE

## 2019-06-05 DIAGNOSIS — J44.9 STAGE 1 MILD COPD BY GOLD CLASSIFICATION (HCC): ICD-10-CM

## 2019-06-05 DIAGNOSIS — R05.3 CHRONIC COUGH: ICD-10-CM

## 2019-06-05 DIAGNOSIS — E88.01 HETEROZYGOUS ALPHA 1-ANTITRYPSIN DEFICIENCY (HCC): ICD-10-CM

## 2019-06-05 PROCEDURE — 71250 CT THORAX DX C-: CPT

## 2019-06-05 NOTE — PROGRESS NOTES
Pt was notified that Ct shows some progression of emphysema and some bronchiectasis. Pt stated his cough is under control and he has been using albuterol,mucinex as prescribed. He stated that he did not have a flutter device.

## 2019-06-05 NOTE — PROGRESS NOTES
Reviewed for Dr. Monalisa Cooney. CT shows some progression of emphysema, and some bronchiectasis. There is a nodule that is unchanged. How is cough? If cough is bothersome, make sure he is using Mucinex 1200 mg bid, albuterol 4 times daily, and has a flutter valve to use 10 reps 3-4 times day.   If these are ineffective, can consider percussion vest.

## 2019-06-25 PROBLEM — D12.2 ADENOMATOUS POLYP OF ASCENDING COLON: Status: ACTIVE | Noted: 2019-06-25

## 2019-09-30 PROBLEM — R73.01 FASTING HYPERGLYCEMIA: Chronic | Status: ACTIVE | Noted: 2019-09-30

## 2020-11-09 ENCOUNTER — HOSPITAL ENCOUNTER (OUTPATIENT)
Dept: LAB | Age: 70
Discharge: HOME | End: 2020-11-09
Payer: MEDICARE

## 2020-11-09 DIAGNOSIS — J43.2 CENTRILOBULAR EMPHYSEMA (HCC): ICD-10-CM

## 2020-11-09 DIAGNOSIS — J47.1 BRONCHIECTASIS WITH (ACUTE) EXACERBATION (HCC): ICD-10-CM

## 2020-11-09 DIAGNOSIS — E88.01 LOW PLASMA ALPHA-1 ANTITRYPSIN (HCC): ICD-10-CM

## 2020-11-09 LAB
ALBUMIN SERPL-MCNC: 4 G/DL (ref 3.2–4.6)
ALBUMIN/GLOB SERPL: 1.1 {RATIO} (ref 1.2–3.5)
ALP SERPL-CCNC: 122 U/L (ref 50–136)
ALT SERPL-CCNC: 42 U/L (ref 12–65)
AST SERPL-CCNC: 32 U/L (ref 15–37)
BILIRUB DIRECT SERPL-MCNC: 0.1 MG/DL
BILIRUB SERPL-MCNC: 0.4 MG/DL (ref 0.2–1.1)
GLOBULIN SER CALC-MCNC: 3.7 G/DL (ref 2.3–3.5)
PROT SERPL-MCNC: 7.7 G/DL (ref 6.3–8.2)

## 2020-11-09 PROCEDURE — 36415 COLL VENOUS BLD VENIPUNCTURE: CPT

## 2020-11-09 PROCEDURE — 82103 ALPHA-1-ANTITRYPSIN TOTAL: CPT

## 2020-11-09 PROCEDURE — 80076 HEPATIC FUNCTION PANEL: CPT

## 2020-11-10 LAB — A1AT SERPL-MCNC: 136 MG/DL (ref 101–187)

## 2021-07-22 PROBLEM — K42.9 UMBILICAL HERNIA WITHOUT OBSTRUCTION AND WITHOUT GANGRENE: Status: ACTIVE | Noted: 2021-07-22

## 2021-08-03 PROBLEM — N52.9 ED (ERECTILE DYSFUNCTION): Status: RESOLVED | Noted: 2021-08-03 | Resolved: 2021-08-03

## 2022-01-13 ENCOUNTER — HOSPITAL ENCOUNTER (OUTPATIENT)
Dept: LAB | Age: 72
Discharge: HOME | End: 2022-01-13
Payer: MEDICARE

## 2022-01-13 DIAGNOSIS — I10 ESSENTIAL HYPERTENSION: ICD-10-CM

## 2022-01-13 LAB
ANION GAP SERPL CALC-SCNC: 7 MMOL/L (ref 7–16)
BUN SERPL-MCNC: 14 MG/DL (ref 8–23)
CALCIUM SERPL-MCNC: 9.9 MG/DL (ref 8.3–10.4)
CHLORIDE SERPL-SCNC: 106 MMOL/L (ref 98–107)
CO2 SERPL-SCNC: 25 MMOL/L (ref 21–32)
CREAT SERPL-MCNC: 1.05 MG/DL (ref 0.8–1.5)
GLUCOSE SERPL-MCNC: 107 MG/DL (ref 65–100)
POTASSIUM SERPL-SCNC: 4.3 MMOL/L (ref 3.5–5.1)
SODIUM SERPL-SCNC: 138 MMOL/L (ref 138–145)

## 2022-01-13 PROCEDURE — 80048 BASIC METABOLIC PNL TOTAL CA: CPT

## 2022-01-13 PROCEDURE — 36415 COLL VENOUS BLD VENIPUNCTURE: CPT

## 2022-03-18 PROBLEM — E53.8 VITAMIN B 12 DEFICIENCY: Status: ACTIVE | Noted: 2018-08-23

## 2022-03-18 PROBLEM — R73.01 FASTING HYPERGLYCEMIA: Status: ACTIVE | Noted: 2019-09-30

## 2022-03-18 PROBLEM — R53.82 CHRONIC FATIGUE: Status: ACTIVE | Noted: 2018-08-29

## 2022-03-18 PROBLEM — K42.9 UMBILICAL HERNIA WITHOUT OBSTRUCTION AND WITHOUT GANGRENE: Status: ACTIVE | Noted: 2021-07-22

## 2022-03-18 PROBLEM — R00.0 TACHYCARDIA: Status: ACTIVE | Noted: 2018-01-24

## 2022-03-19 PROBLEM — Z91.14 NONCOMPLIANCE WITH CPAP TREATMENT: Status: ACTIVE | Noted: 2018-01-08

## 2022-03-19 PROBLEM — Z96.641 STATUS POST RIGHT HIP REPLACEMENT: Status: ACTIVE | Noted: 2018-01-12

## 2022-03-19 PROBLEM — Z96.641 H/O TOTAL HIP ARTHROPLASTY, RIGHT: Status: ACTIVE | Noted: 2018-01-13

## 2022-03-19 PROBLEM — Z91.199 NONCOMPLIANCE WITH CPAP TREATMENT: Status: ACTIVE | Noted: 2018-01-08

## 2022-03-19 PROBLEM — R06.02 SHORTNESS OF BREATH: Status: ACTIVE | Noted: 2018-08-29

## 2022-03-20 PROBLEM — G47.33 OSA (OBSTRUCTIVE SLEEP APNEA): Status: ACTIVE | Noted: 2018-02-26

## 2022-03-24 ENCOUNTER — HOSPITAL ENCOUNTER (OUTPATIENT)
Dept: SLEEP MEDICINE | Age: 72
Discharge: HOME | End: 2022-03-24
Payer: MEDICARE

## 2022-03-24 PROCEDURE — 95806 SLEEP STUDY UNATT&RESP EFFT: CPT

## 2022-08-18 ENCOUNTER — OFFICE VISIT (OUTPATIENT)
Dept: PULMONOLOGY | Age: 72
End: 2022-08-18
Payer: MEDICARE

## 2022-08-18 VITALS
WEIGHT: 187 LBS | BODY MASS INDEX: 30.05 KG/M2 | HEIGHT: 66 IN | OXYGEN SATURATION: 95 % | HEART RATE: 81 BPM | TEMPERATURE: 97.1 F | DIASTOLIC BLOOD PRESSURE: 80 MMHG | SYSTOLIC BLOOD PRESSURE: 136 MMHG

## 2022-08-18 DIAGNOSIS — G47.33 OSA (OBSTRUCTIVE SLEEP APNEA): ICD-10-CM

## 2022-08-18 DIAGNOSIS — J44.9 CHRONIC OBSTRUCTIVE PULMONARY DISEASE, UNSPECIFIED COPD TYPE (HCC): Primary | ICD-10-CM

## 2022-08-18 DIAGNOSIS — E88.01 AAT (ALPHA-1-ANTITRYPSIN) DEFICIENCY (HCC): ICD-10-CM

## 2022-08-18 DIAGNOSIS — Z78.9 INTOLERANCE OF CONTINUOUS POSITIVE AIRWAY PRESSURE (CPAP) VENTILATION: ICD-10-CM

## 2022-08-18 DIAGNOSIS — R05.9 COUGH: ICD-10-CM

## 2022-08-18 DIAGNOSIS — J47.9 BRONCHIECTASIS, UNCOMPLICATED (HCC): ICD-10-CM

## 2022-08-18 DIAGNOSIS — Z87.891 PERSONAL HISTORY OF TOBACCO USE: ICD-10-CM

## 2022-08-18 PROCEDURE — 99215 OFFICE O/P EST HI 40 MIN: CPT | Performed by: INTERNAL MEDICINE

## 2022-08-18 PROCEDURE — G8417 CALC BMI ABV UP PARAM F/U: HCPCS | Performed by: INTERNAL MEDICINE

## 2022-08-18 PROCEDURE — 3017F COLORECTAL CA SCREEN DOC REV: CPT | Performed by: INTERNAL MEDICINE

## 2022-08-18 PROCEDURE — 1036F TOBACCO NON-USER: CPT | Performed by: INTERNAL MEDICINE

## 2022-08-18 PROCEDURE — 1123F ACP DISCUSS/DSCN MKR DOCD: CPT | Performed by: INTERNAL MEDICINE

## 2022-08-18 PROCEDURE — G8427 DOCREV CUR MEDS BY ELIG CLIN: HCPCS | Performed by: INTERNAL MEDICINE

## 2022-08-18 PROCEDURE — 3023F SPIROM DOC REV: CPT | Performed by: INTERNAL MEDICINE

## 2022-08-18 RX ORDER — AZITHROMYCIN 250 MG/1
250 TABLET, FILM COATED ORAL SEE ADMIN INSTRUCTIONS
Qty: 6 TABLET | Refills: 0 | Status: CANCELLED | OUTPATIENT
Start: 2022-08-18 | End: 2022-08-23

## 2022-08-18 RX ORDER — AZITHROMYCIN 500 MG/1
500 TABLET, FILM COATED ORAL DAILY
Qty: 5 TABLET | Refills: 0 | Status: SHIPPED | OUTPATIENT
Start: 2022-08-18 | End: 2022-08-23

## 2022-08-18 NOTE — PROGRESS NOTES
Dr. Sierra Cabral MD  [address]. [address] [facility], [address] [facility]  [phone]    Patient Name:  Prakash Shafer  YOB: 1950  Office Visit: 8/18/2022    ASSESSMENT AND PLAN:  (Medical Decision Making)  Parkash Shafer is a 71 y.o. male with GOLD stage 2 COPD, PI*MF alpha-1 antitrypsin deficiency on augmentation therapy. 1. Chronic obstructive pulmonary disease, unspecified COPD type ([address]  Inhalers are from the [facility] and lung function is not declining. HE feels well. Consider step up to Triple inhaler therapy at next visit. 2. Bronchiectasis, uncomplicated ([address]   Plan for treatment with azithro and if no improvement will collect sputum culture. -     Culture, Respiratory; Future  -     azithromycin (ZITHROMAX) 500 MG tablet; Take 1 tablet by mouth daily for 5 days, Disp-5 tablet, R-0Normal    3. Personal history of tobacco use   Lung screening CT arranged. -     Culture, Respiratory; Future  -     CT LUNG SCREENING; Future    4. Cough   Suspect mild exacerbation of bronchiectasis  -     Culture, Respiratory; Future    5. Intolerance of continuous positive airway pressure (CPAP) ventilation  -     will refer for evaluation of appropriateness of inspire device.  -     [facility] - Lizbeth Hsu MD, Sleep Medicine, [place]    6. CAROLINA (obstructive sleep apnea)  -     [facility] - Lizbeth Hsu MD, Sleep Medicine, [place]    7. AAT (alpha-1-antitrypsin) deficiency (HCC)   Resumed prolastin therapy. Reno Mulligan MD  Electronically signed    Clinical time for encounter was 40 minutes.   _____________________________________________________________________________________________________________________    Reason for Visit:  COPD and Follow-up      History of Present Illness:     Prakash Shafer Is a 71 y.o. male with a past medical history of mild COPD (Gold Stage 2), significant smoking history of 60 pack years  (quit 1997), rare heterozygous mutation of alpha-1 antitrypsin deficiency (PI*MF- renders half of his A1AT protein nonfunctional), mild CAROLINA, nocturnal hypoxemia. He continues Prolastin therapy which is going well every Wednesday. He took vacation for 3 months and is now getting restarted at the infusion center with augmentation therapy. He had COVID-19 in November 2020 and is now fully vaccinated. Wearing 2lpm O2 at night but does not require it at all during daytime. He had history of moderate CAROLINA but has been unable to tolerate any of the CPAP masks. He is interested in the inspire device. He has been coughing up sputum more frequently and he has been producing about 1/2 cup a day. Tobacco Use: Medium Risk    Smoking Tobacco Use: Former    Smokeless Tobacco Use: Never         Physical exam:    Vitals:    08/18/22 0828   BP: 136/80   Pulse: 81   Temp: 97.1 °F (36.2 °C)   TempSrc: Skin   SpO2: 95%   Weight: 187 lb (84.8 kg)   Height: 5' 6\" (1.676 m)       Body mass index is 30.18 kg/m². General Appearance: The patient is pleasa and in no respiratory distress. HEENT: PERRLA. Conjunctivae unremarkable. Nasal mucosa is without epistaxis, exudate, or polyps. Gums and dentition are unremarkable. There is no oropharyngeal narrowing. TMs are clear. Neck/Lymphatic:  Symmetrical with no elevation of jugular venous pulsation. Trachea midline. No thyroid enlargement. No cervical adenopathy. Lungs:  Normal respiratory effort with symmetrical lung expansion. Breath sounds CTA. Heart:  RRR  Abdomen:  Soft and non-tender. No hepatosplenomegaly. Bowel sounds are normal.    Extremity:  No edema, clubbing or cyanosis  Neuro: The patient is alert and oriented to person, place, and time. Memory appears intact and mood is normal.  No gross sensorimotor deficits are present.       DIAGNOSTIC TESTS:                                                                                                                Spirometry:             5/31/2016 2/7/19 2/18/20 06/09/20 11/09/20 6/28/21   FVC   3.17 (78%)   3.77 (94%)  2.71-69  3.58-(91%)  3.06-(72%)  2.87-(73%)   FEV1   2.17 (68%)   2.55 (85%)  1.87-61  2.46-(80%)  2.17-(66%)  1.92-(63%)             FEV1/FVC   0.69   0.68  0.69  0.69  0.71  0.67   TLC   5.59 (90%)   5.84 (94%)  5.71-91      5.78-(93%)   FRC     2.87 (88%)  3.         RV   2.05   2.06 (95%)  2.         CO   58%   37%  10.5-37      12.1-(43%)              02/24/22     FVC  2.61-70     FEV1  1.87-69     FEV1/FVC  72     TLC        FRC        RV        DLCO           Labs:   Lab Results   Component Value Date/Time     04/12/2022 02:06 PM    K 4.4 04/12/2022 02:06 PM     04/12/2022 02:06 PM    CO2 21 04/12/2022 02:06 PM    BUN 13 04/12/2022 02:06 PM    CREATININE 0.96 04/12/2022 02:06 PM    GLUCOSE 102 04/12/2022 02:06 PM    CALCIUM 9.8 04/12/2022 02:06 PM      Lab Results   Component Value Date    WBC 6.2 04/12/2022    HGB 16.1 04/12/2022    HCT 47.8 04/12/2022    MCV 98 (H) 04/12/2022     04/12/2022     No results found for: DDIMER  Lab Results   Component Value Date/Time    ALKPHOS 165 04/12/2022 02:06 PM    ALT 26 04/12/2022 02:06 PM    AST 27 04/12/2022 02:06 PM    PROT 7.0 04/12/2022 02:06 PM    BILITOT 0.4 04/12/2022 02:06 PM    BILIDIR 0.1 11/09/2020 10:52 AM    LABALBU 4.3 04/12/2022 02:06 PM     No results found for: BNP  No results for input(s): PHAPOC, OZF3XWOV, ZF9HDXU, VLI8EGC, BE in the last 72 hours. Imaging:    CT chest 2019  IMPRESSION:        1. Interval progression of mild bilateral upper lobe predominant panlobular   emphysema and lower lobe predominant bronchiectasis. 2.  Other chronic findings as above.        Past Medical History:   Diagnosis Date    Abnormal cardiovascular function study 1/26/2016    Adenoma of left adrenal gland     Arthritis     general    BPH (benign prostatic hyperplasia)     Chest discomfort 1/26/2016    Chronic kidney disease     Chronic low back pain     Chronic pain     back, hip COPD (chronic obstructive pulmonary disease) (HCC)     mild per pt; no rescue inhaler; denies SOB with 1 flight of steps    Coronary artery disease     had CABG 9/14/15 s/p failed stress test    ED (erectile dysfunction)     GERD (gastroesophageal reflux disease)     controlled with med    History of basal cell cancer     Hypertension     controlled with med    Left atrial dilation 10/2016    LVH (left ventricular hypertrophy) 02/2018    Mitral valve regurgitation 10/2016    1+    Mixed hyperlipidemia     Murmur, cardiac     soft 1/6 MR murmur per cardiologist note 11-10-17 : per echo 10-24-16 \"mild mitral regurg\"    Nausea & vomiting     responds to IV antiemetic    Palpitations     Pleural effusion exudative 9/30/15    t-cent on left    Prostatic hypertrophy, benign 1/26/2016    Sleep apnea     pt reports mild- no c-pap    Status post right hip replacement 1/12/2018    Tricuspid valvular regurgitation       Tobacco Use: Medium Risk    Smoking Tobacco Use: Former    Smokeless Tobacco Use: Never     Allergies   Allergen Reactions    Codeine Rash     Tolerates Percocet without side- effects     Current Outpatient Medications   Medication Instructions    albuterol sulfate  (90 Base) MCG/ACT inhaler 2 puffs, Inhalation, EVERY 4 HOURS PRN    aspirin 81 MG EC tablet Oral, DAILY    azithromycin (ZITHROMAX) 500 mg, Oral, DAILY    budesonide-formoterol (SYMBICORT) 160-4.5 MCG/ACT AERO 2 puffs, Inhalation, 2 TIMES DAILY    cyanocobalamin 1,000 mcg, Oral, DAILY    fluticasone (FLONASE) 50 MCG/ACT nasal spray 2 sprays, Nasal    ibuprofen (ADVIL;MOTRIN) 800 mg, Oral, EVERY 6 HOURS PRN    losartan (COZAAR) 100 mg, Oral, DAILY    metoprolol succinate (TOPROL XL) 25 mg, Oral, DAILY    nitroGLYCERIN (NITROSTAT) 0.3 mg, SubLINGual    omeprazole (PRILOSEC) 40 mg, Oral    OXYGEN 2lpm qhs    rosuvastatin (CRESTOR) 40 mg, Oral    tamsulosin (FLOMAX) 0.4 mg, Oral    tiotropium (SPIRIVA RESPIMAT) 2.5 MCG/ACT AERS inhaler 2 puffs, Inhalation, DAILY    vitamin E 100 units capsule Oral, DAILY     Past Medical History:   Diagnosis Date    Abnormal cardiovascular function study 1/26/2016    Adenoma of left adrenal gland     Arthritis     general    BPH (benign prostatic hyperplasia)     Chest discomfort 1/26/2016    Chronic kidney disease     Chronic low back pain     Chronic pain     back, hip    COPD (chronic obstructive pulmonary disease) (HCC)     mild per pt; no rescue inhaler; denies SOB with 1 flight of steps    Coronary artery disease     had CABG 9/14/15 s/p failed stress test    ED (erectile dysfunction)     GERD (gastroesophageal reflux disease)     controlled with med    History of basal cell cancer     Hypertension     controlled with med    Left atrial dilation 10/2016    LVH (left ventricular hypertrophy) 02/2018    Mitral valve regurgitation 10/2016    1+    Mixed hyperlipidemia     Murmur, cardiac     soft 1/6 MR murmur per cardiologist note 11-10-17 : per echo 10-24-16 \"mild mitral regurg\"    Nausea & vomiting     responds to IV antiemetic    Palpitations     Pleural effusion exudative 9/30/15    t-cent on left    Prostatic hypertrophy, benign 1/26/2016    Sleep apnea     pt reports mild- no c-pap    Status post right hip replacement 1/12/2018    Tricuspid valvular regurgitation      Family History   Problem Relation Age of Onset    Lung Disease Brother     Heart Disease Brother     Heart Disease Father     Hypertension Father     Stroke Father     Heart Disease Mother     Heart Disease Brother     Heart Disease Sister     Cancer Sister         Pancrease    Cancer Brother         lung    Lung Disease Sister

## 2022-08-18 NOTE — PATIENT INSTRUCTIONS
We are arranging a low dose CT before your Feb 2023 appointment as well as the complete breathing test.  Let's try azithromycin x 5 days to try to help with the mucus production. If it doesn't help, here are some ideas. Try mucinex in a small bottle. OK to take up to 1200mg twice a day. We can collect a sputum culture to see if there would be a better antibiotic than azithromycin. Going to get you an appointment with a sleep doc without additional testing, to see if the inspire device might be a good idea for fixing your sleep apnea. 4.  We'll reach out to [facility] about your orders for prolastin. If you talk to them Wednesday and they need orders, have them call us at [phone] and ask for Jeyson Hansen or leave a message for Jeyson Hansen.

## 2022-08-24 DIAGNOSIS — J44.9 CHRONIC OBSTRUCTIVE PULMONARY DISEASE, UNSPECIFIED COPD TYPE (HCC): ICD-10-CM

## 2022-08-24 DIAGNOSIS — Z87.891 PERSONAL HISTORY OF TOBACCO USE: ICD-10-CM

## 2022-08-24 DIAGNOSIS — R05.9 COUGH: ICD-10-CM

## 2022-08-24 DIAGNOSIS — J47.9 BRONCHIECTASIS, UNCOMPLICATED (HCC): ICD-10-CM

## 2022-08-25 DIAGNOSIS — G47.33 OSA (OBSTRUCTIVE SLEEP APNEA): Primary | ICD-10-CM

## 2022-08-26 ENCOUNTER — TELEPHONE (OUTPATIENT)
Dept: PULMONOLOGY | Age: 72
End: 2022-08-26

## 2022-08-26 LAB
BACTERIA SPEC CULT: NORMAL
GRAM STN SPEC: NORMAL
SERVICE CMNT-IMP: NORMAL

## 2022-08-26 NOTE — TELEPHONE ENCOUNTER
Called  let patient know, per Dr. Luana Neville  that the sputum cultures have no growth of specific bacteria. Pt voiced understanding.

## 2022-08-30 ENCOUNTER — TELEPHONE (OUTPATIENT)
Dept: INTERNAL MEDICINE CLINIC | Facility: CLINIC | Age: 72
End: 2022-08-30

## 2022-09-02 ENCOUNTER — TELEPHONE (OUTPATIENT)
Dept: INTERNAL MEDICINE CLINIC | Facility: CLINIC | Age: 72
End: 2022-09-02

## 2022-10-04 ENCOUNTER — OFFICE VISIT (OUTPATIENT)
Dept: SLEEP MEDICINE | Age: 72
End: 2022-10-04

## 2022-10-04 VITALS
SYSTOLIC BLOOD PRESSURE: 158 MMHG | DIASTOLIC BLOOD PRESSURE: 72 MMHG | OXYGEN SATURATION: 96 % | BODY MASS INDEX: 29.89 KG/M2 | TEMPERATURE: 97 F | RESPIRATION RATE: 14 BRPM | HEART RATE: 80 BPM | WEIGHT: 186 LBS | HEIGHT: 66 IN

## 2022-10-04 DIAGNOSIS — Z53.8 APPOINTMENT CANCELED BY HOSPITAL: Primary | ICD-10-CM

## 2022-10-06 ENCOUNTER — OFFICE VISIT (OUTPATIENT)
Dept: INTERNAL MEDICINE CLINIC | Facility: CLINIC | Age: 72
End: 2022-10-06
Payer: MEDICARE

## 2022-10-06 VITALS
DIASTOLIC BLOOD PRESSURE: 68 MMHG | OXYGEN SATURATION: 95 % | BODY MASS INDEX: 30.41 KG/M2 | HEART RATE: 73 BPM | SYSTOLIC BLOOD PRESSURE: 140 MMHG | TEMPERATURE: 98.2 F | WEIGHT: 188.4 LBS

## 2022-10-06 DIAGNOSIS — I10 ESSENTIAL HYPERTENSION: ICD-10-CM

## 2022-10-06 DIAGNOSIS — Z23 NEED FOR IMMUNIZATION AGAINST INFLUENZA: ICD-10-CM

## 2022-10-06 DIAGNOSIS — Z12.5 ENCOUNTER FOR SCREENING FOR MALIGNANT NEOPLASM OF PROSTATE: ICD-10-CM

## 2022-10-06 DIAGNOSIS — E88.01 ALPHA-1-ANTITRYPSIN DEFICIENCY (HCC): Chronic | ICD-10-CM

## 2022-10-06 DIAGNOSIS — E78.2 MIXED HYPERLIPIDEMIA: Primary | Chronic | ICD-10-CM

## 2022-10-06 DIAGNOSIS — Z86.010 HISTORY OF ADENOMATOUS POLYP OF COLON: ICD-10-CM

## 2022-10-06 PROBLEM — G47.33 OSA (OBSTRUCTIVE SLEEP APNEA): Chronic | Status: ACTIVE | Noted: 2018-02-26

## 2022-10-06 PROCEDURE — 3017F COLORECTAL CA SCREEN DOC REV: CPT | Performed by: NURSE PRACTITIONER

## 2022-10-06 PROCEDURE — G8484 FLU IMMUNIZE NO ADMIN: HCPCS | Performed by: NURSE PRACTITIONER

## 2022-10-06 PROCEDURE — 1123F ACP DISCUSS/DSCN MKR DOCD: CPT | Performed by: NURSE PRACTITIONER

## 2022-10-06 PROCEDURE — G0008 ADMIN INFLUENZA VIRUS VAC: HCPCS | Performed by: NURSE PRACTITIONER

## 2022-10-06 PROCEDURE — 99214 OFFICE O/P EST MOD 30 MIN: CPT | Performed by: NURSE PRACTITIONER

## 2022-10-06 PROCEDURE — G8427 DOCREV CUR MEDS BY ELIG CLIN: HCPCS | Performed by: NURSE PRACTITIONER

## 2022-10-06 PROCEDURE — G8417 CALC BMI ABV UP PARAM F/U: HCPCS | Performed by: NURSE PRACTITIONER

## 2022-10-06 PROCEDURE — 90694 VACC AIIV4 NO PRSRV 0.5ML IM: CPT | Performed by: NURSE PRACTITIONER

## 2022-10-06 PROCEDURE — 1036F TOBACCO NON-USER: CPT | Performed by: NURSE PRACTITIONER

## 2022-10-06 RX ORDER — AMLODIPINE BESYLATE 2.5 MG/1
2.5 TABLET ORAL DAILY
Qty: 30 TABLET | Refills: 11 | Status: SHIPPED | OUTPATIENT
Start: 2022-10-06

## 2022-10-06 NOTE — PROGRESS NOTES
PROGRESS NOTE    SUBJECTIVE:   Sayra Fonseca is a 71 y.o. male seen for a follow up visit for   Chief Complaint   Patient presents with    Follow-up Chronic Condition    Discuss Labs     Hypertension   This is a chronic problem. Episode onset: 2005. The problem has not changed since onset. Associated symptoms include shortness of breath ( Mild CERDA, no worse. ). Pertinent negatives include no chest pain, no orthopnea, no palpitations, no PND, no malaise/fatigue, no blurred vision, no headaches, no tinnitus, no dizziness and no nausea. Risk factors include dyslipidemia, smoking/tobacco exposure and male gender. Cholesterol Problem  This is a chronic problem. Episode onset: 2005. The problem occurs constantly. The problem has been gradually improving. Associated symptoms include shortness of breath ( Mild CERDA, no worse. ). Pertinent negatives include no chest pain, no abdominal pain and no headaches. The symptoms are relieved by medications. Treatments tried: statin. GERD  This is a chronic problem. The current episode started years ago. The problem occurs rarely. The problem has been gradually improving. The symptoms are relieved by medications. The treatment provided significant relief. COPD  The history is provided by the patient and medical records (alpha-1 anti-tryptin). This is a chronic problem. Episode onset: 2015. The problem occurs constantly. Associated symptoms include shortness of breath ( Mild ECRDA, no worse. ). Pertinent negatives include no chest pain, no abdominal pain and no headaches. The symptoms are aggravated by exertion. Arthritis  The history is provided by the patient (polyarthralgia). This is a chronic problem. Episode onset: years ago. Reviewed and updated this visit by provider:  Tobacco  Allergies  Meds  Problems  Med Hx  Surg Hx  Fam Hx         Review of Systems   Constitutional:  Negative for chills, fatigue and fever.    Respiratory:  Positive for cough and shortness of breath (with exertion). Negative for chest tightness and wheezing. Cardiovascular:  Negative for chest pain, palpitations and leg swelling. Gastrointestinal:  Negative for abdominal pain. Endocrine: Negative for polydipsia, polyphagia and polyuria. Genitourinary:  Negative for frequency. Musculoskeletal:  Negative for gait problem. Skin:  Negative for rash. Neurological:  Negative for weakness, numbness and headaches. Psychiatric/Behavioral:  Negative for dysphoric mood. The patient is not nervous/anxious. OBJECTIVE:    BP (!) 140/68 (Site: Left Upper Arm, Position: Sitting, Cuff Size: Small Adult)   Pulse 73   Temp 98.2 °F (36.8 °C) (Temporal)   Wt 188 lb 6.4 oz (85.5 kg)   SpO2 95%   BMI 30.41 kg/m²      Physical Exam  Vitals and nursing note reviewed. Constitutional:       Appearance: Normal appearance. HENT:      Head: Normocephalic. Right Ear: Decreased hearing noted. Left Ear: Decreased hearing noted. Mouth/Throat:      Lips: Pink. Mouth: Mucous membranes are moist.   Eyes:      General: Lids are normal.   Neck:      Vascular: No carotid bruit. Cardiovascular:      Rate and Rhythm: Normal rate and regular rhythm. Pulses:           Carotid pulses are 2+ on the right side and 2+ on the left side. Posterior tibial pulses are 2+ on the right side and 2+ on the left side. Heart sounds: Murmur heard. Systolic (faint) murmur is present. Pulmonary:      Effort: Pulmonary effort is normal.      Breath sounds: Examination of the right-lower field reveals decreased breath sounds. Examination of the left-lower field reveals decreased breath sounds. Decreased breath sounds present. Musculoskeletal:      Cervical back: Neck supple. Right lower leg: No edema. Left lower leg: No edema. Skin:     General: Skin is warm and dry. Neurological:      Mental Status: He is alert and oriented to person, place, and time.  Mental status is at baseline. Gait: Gait normal.   Psychiatric:         Mood and Affect: Mood normal.         Behavior: Behavior normal.        ASSESSMENT and PLAN    1. Mixed hyperlipidemia  Assessment & Plan:   Well-controlled, continue current medications  Orders:  -     Comprehensive Metabolic Panel; Future  -     Lipid Panel; Future  2. Alpha-1-antitrypsin deficiency [facility])  Assessment & Plan:   Monitored by specialist- no acute findings meriting change in the plan  3. Essential hypertension  Assessment & Plan:   Uncontrolled, changes made today: add amlodipine  Orders:  -     CBC with Auto Differential; Future  -     Comprehensive Metabolic Panel; Future  -     TSH; Future  -     amLODIPine (NORVASC) 2.5 MG tablet; Take 1 tablet by mouth daily, Disp-30 tablet, R-11Normal  -     MyChart Blood Pressure Flowsheet  4. Encounter for screening for malignant neoplasm of prostate  -     PSA Screening; Future  5. Need for immunization against influenza  -     Influenza, FLUAD, (age 72 y+), IM, Preservative Free, 0.5 mL  6. History of adenomatous polyp of colon  Assessment & Plan:   refer to [facility] for repeat colonoscopy. Orders:  -     [facility] - [facility]- Colonoscopy      Return in about 3 months (around 1/6/2023) for Follow-Up, HTN, HLD, with labs. the following changes in treatment are made: add amlodipine  Low salt diet. Exercise, adequate rest.  lab results and schedule of future lab studies reviewed with patient  reviewed diet, exercise and weight control  cardiovascular risk and specific lipid/LDL goals reviewed  reviewed medications and side effects in detail      MAR Latham - NP    Dictated using voice recognition software.  Proofread, but unrecognized voice recognition errors may exist.

## 2022-10-18 PROBLEM — Z86.010 HX OF ADENOMATOUS POLYP OF COLON: Status: ACTIVE | Noted: 2022-10-18

## 2022-10-18 PROBLEM — Z86.0101 HX OF ADENOMATOUS POLYP OF COLON: Status: ACTIVE | Noted: 2022-10-18

## 2022-10-18 ASSESSMENT — ENCOUNTER SYMPTOMS
COUGH: 1
CHEST TIGHTNESS: 0
ABDOMINAL PAIN: 0
WHEEZING: 0
SHORTNESS OF BREATH: 1

## 2022-11-27 ASSESSMENT — ENCOUNTER SYMPTOMS
DIARRHEA: 0
COUGH: 0
ABDOMINAL DISTENTION: 0
PHOTOPHOBIA: 0
CONSTIPATION: 0
SORE THROAT: 0
ABDOMINAL PAIN: 0

## 2022-11-28 ENCOUNTER — TELEPHONE (OUTPATIENT)
Dept: CARDIOLOGY CLINIC | Age: 72
End: 2022-11-28

## 2022-11-28 NOTE — TELEPHONE ENCOUNTER
Pt c/o L leg giving him trouble, pain, knot where vein was harvested for bypass. Pt has UCD FU tomorrow. Asking to be seen today. Informed pt blood clot in leg normally treated per PCP. Advise pt call PCP, see if test can be ordered or pt worked in. FU with Dr. Soy Baeza tomorrow as scheduled. Pt voiced understanding and thanks.  cgh

## 2022-11-28 NOTE — PROGRESS NOTES
[facility] CARDIOLOGY  [address], [address]  PHONE: [phone]        NAME:  Shukri Guidry  : 1950  MRN: [id number]     PCP:  MAR Figueroa NP      SUBJECTIVE:   Shukri Guidry is a 71 y.o. male seen for a follow up visit regarding the following:     Chief Complaint   Patient presents with    Hypertension     6 month f/u        HPI:  Doing well since last visit without interval angina, CHF, palpitations, edema, presyncope or syncope but continues to describe recurrent exertional dyspnea without any other associated symptoms. Staying active without any significant limitations other than dyspnea, using home O2 at night but wonders if he needs it during the day, will discuss with [facility] pulmonary in August.   Lipid and liver excellent 2021, see below. Clinically euvolemic today. He notes frequent wheezing with his shortness of breath episodes which are present both at rest, as and last night, and with exertion. He denies any chest discomfort symptoms whatsoever with his exertional  dyspnea. Nuclear stress test last visit showed no reversible ischemia. He has increased RV uptake on the nuclear stress test but he also has known left main disease by catheterization in 2018 with 3 out of 3 patent bypass grafts. Given the absence  of any regional ischemia on the nuclear stress test, it is doubtful that he has any new severe stenosis of one of his bypass grafts and I think his breathing, especially given the fact that he is wheezing with the dyspnea, is mostly due to pulmonary disease. He is encouraged to use his Symbicort regularly and doing better from a wheezing standpoint. He is counseled regarding the importance of twice daily Symbicort use which will probably help his baseline dyspnea. Echo 2018 showed normal EF and mild diastolic dysfunction with no significant valvular regurgitation and normal PA pressures by estimation.  Cath September, 2018 showed:   1. Three out of 3 patent bypass grafts. 2.  Normal left ventricular regional wall motion and ejection fraction. Intolerant to multiple forms of CPAP and seeing pulmonary with COPD and alpha-1 antripsyn deficiency. Echo 4/2020 with normal LV function and wall motion, no significant valvular heart disease. Nuclear stress test April 2022 without ischemia. There is a fixed diaphragmatic attenuation artifact but normal LV regional wall motion and ejection fraction. Past Medical History, Past Surgical History, Family history, Social History, and Medications were all reviewed with the patient today and updated as necessary.      Current Outpatient Medications   Medication Sig Dispense Refill    amLODIPine (NORVASC) 2.5 MG tablet Take 1 tablet by mouth daily (Patient taking differently: Take 5 mg by mouth daily) 30 tablet 11    OXYGEN 2lpm qhs      albuterol sulfate  (90 Base) MCG/ACT inhaler Inhale 2 puffs into the lungs every 4 hours as needed      aspirin 81 MG EC tablet Take by mouth daily      budesonide-formoterol (SYMBICORT) 160-4.5 MCG/ACT AERO Inhale 2 puffs into the lungs 2 times daily      cyanocobalamin 1000 MCG tablet Take 1,000 mcg by mouth daily      fluticasone (FLONASE) 50 MCG/ACT nasal spray 2 sprays by Nasal route      ibuprofen (ADVIL;MOTRIN) 800 MG tablet Take 800 mg by mouth every 6 hours as needed      losartan (COZAAR) 100 MG tablet Take 100 mg by mouth daily      metoprolol succinate (TOPROL XL) 25 MG extended release tablet Take 25 mg by mouth daily      nitroGLYCERIN (NITROSTAT) 0.3 MG SL tablet Place 0.3 mg under the tongue      omeprazole (PRILOSEC) 40 MG delayed release capsule Take 40 mg by mouth      rosuvastatin (CRESTOR) 40 MG tablet Take 40 mg by mouth      tamsulosin (FLOMAX) 0.4 MG capsule Take 0.4 mg by mouth      tiotropium (SPIRIVA RESPIMAT) 2.5 MCG/ACT AERS inhaler Inhale 2 puffs into the lungs daily      vitamin E 100 units capsule Take by mouth daily       No current facility-administered medications for this visit. Allergies   Allergen Reactions    Codeine Rash     Tolerates Percocet without side- effects       Patient Active Problem List    Diagnosis Date Noted    Hx of adenomatous polyp of colon 10/18/2022     Priority: Medium    Benign prostatic hyperplasia 01/26/2016     Priority: Medium     Overview Note:     Formatting of this note might be different from the original.  managed with medication      Mixed hyperlipidemia      Overview Note:     managed with medication         Essential hypertension     Umbilical hernia without obstruction and without gangrene 07/22/2021    Fasting hyperglycemia 09/30/2019     Overview Note:     Hx of        COPD (chronic obstructive pulmonary disease) ([facility]      Overview Note:     Spirometry 11/11/15--mild obstruction        Chronic fatigue 08/29/2018    Shortness of breath 08/29/2018    Vitamin B 12 deficiency 08/23/2018    Mitral valve regurgitation     Tricuspid valvular regurgitation     LVH (left ventricular hypertrophy)     CAROLINA (obstructive sleep apnea) 02/26/2018    Tachycardia 01/24/2018    H/O total hip arthroplasty, right 01/13/2018    Status post right hip replacement 01/12/2018    Noncompliance with CPAP treatment 01/08/2018    GERD (gastroesophageal reflux disease)      Overview Note:     managed with medication         Left atrial dilation 10/01/2016    Intermittent palpitations     Snoring 02/11/2016    Chest discomfort 01/26/2016    Abnormal cardiovascular function study 01/26/2016    Alpha-1-antitrypsin deficiency ([facility] 10/06/2015    History of tobacco abuse 09/16/2015     Overview Note:     2 ppd, 30 years. Albuterol PRN. CAD (coronary artery disease) 09/16/2015     Overview Note:     9/16/15 (Dr Palomo Nunez) Coronary artery bypass grafting x3         S/P CABG (coronary artery bypass graft) 09/16/2015     Overview Note:      LIMA TO LAD, SVG TO OM, SVG TO DISTAL RCA. DR. Carolyne Bueno, 2015        Mitral regurgitation     Impotence of organic origin     Claustrophobia     Primary localized osteoarthrosis, lower leg     Arthritis      Overview Note:     OTC pain reliever as needed         Chronic pain      Overview Note:     lower back        Lumbago     Inguinal hernia, left 2015    S/P prosthetic total arthroplasty of the hip 2014    Lumbar stenosis with neurogenic claudication 10/16/2013    History of adenomatous polyp of colon 2011     Overview Note:     Initial colonoscopy by Dr. Mariano Giraldo: 2011  DIAGNOSIS  HEPATIC FLEXURE POLYP: FRAGMENT OF TUBULAR ADENOMA. Electronically signed out on 2011 14:04 by Abraham Clarke M.D. Past Surgical History:   Procedure Laterality Date    COLONOSCOPY  2011    tubular adenoma    HEENT Bilateral 2003    Lasik    LUMBAR FUSION  2013    Laminectomy & fusion L4/L5    MOHS SURGERY Left     ORTHOPEDIC SURGERY  2017    L3, L4, L5 and S1 fusion with instrumentation, Dr Samaniego Child      thoracentesis    OK ABDOMEN SURGERY PROC UNLISTED Bilateral     IH    OK CARDIAC SURG PROCEDURE UNLIST  2015    CABG    REFRACTIVE SURGERY      bilateral     TOTAL HIP ARTHROPLASTY Right 2018    Dr Chyna Adam Left 3/2014       Family History   Problem Relation Age of Onset    Lung Disease Brother     Heart Disease Brother     Heart Disease Father     Hypertension Father     Stroke Father     Heart Disease Mother     Heart Disease Brother     Heart Disease Sister     Cancer Sister         Pancrease    Cancer Brother         lung    Lung Disease Sister         Social History     Tobacco Use    Smoking status: Former     Packs/day: 2.00     Years: 40.00     Pack years: 80.00     Types: Cigarettes     Quit date: 1997     Years since quittin.8    Smokeless tobacco: Never   Substance Use Topics    Alcohol use:  Yes     Alcohol/week: 5.0 standard drinks ROS:    Review of Systems   Constitutional:  Negative for appetite change, chills, diaphoresis and fatigue. HENT:  Negative for congestion, mouth sores, nosebleeds, sore throat and tinnitus. Eyes:  Negative for photophobia and visual disturbance. Respiratory:  Positive for shortness of breath. Negative for cough. Cardiovascular:  Negative for chest pain, palpitations and leg swelling. Gastrointestinal:  Negative for abdominal distention, abdominal pain, constipation and diarrhea. Endocrine: Negative for cold intolerance, heat intolerance, polydipsia and polyuria. Genitourinary:  Negative for dysuria and hematuria. Musculoskeletal:  Negative for arthralgias, joint swelling and myalgias. Skin:  Negative for rash. Allergic/Immunologic: Negative for environmental allergies and food allergies. Neurological:  Negative for dizziness, seizures, syncope and light-headedness. Hematological:  Negative for adenopathy. Does not bruise/bleed easily. Psychiatric/Behavioral:  Negative for agitation, behavioral problems, dysphoric mood and hallucinations. The patient is not nervous/anxious. PHYSICAL EXAM:     Vitals:    11/29/22 1013   BP: 130/75   Pulse: 76   Weight: 189 lb (85.7 kg)   Height: 5' 6\" (1.676 m)      Wt Readings from Last 3 Encounters:   11/29/22 189 lb (85.7 kg)   10/06/22 188 lb 6.4 oz (85.5 kg)   10/04/22 186 lb (84.4 kg)      BP Readings from Last 3 Encounters:   11/29/22 130/75   10/06/22 (!) 140/68   10/04/22 (!) 158/72        Physical Exam  Constitutional:       Appearance: Normal appearance. He is normal weight. HENT:      Head: Normocephalic and atraumatic. Nose: Nose normal.      Mouth/Throat:      Mouth: Mucous membranes are moist.      Pharynx: Oropharynx is clear. Eyes:      Extraocular Movements: Extraocular movements intact. Pupils: Pupils are equal, round, and reactive to light. Neck:      Vascular: No carotid bruit or JVD.    Cardiovascular: Rate and Rhythm: Normal rate and regular rhythm. Heart sounds: No murmur heard. No friction rub. No gallop. Pulmonary:      Effort: Pulmonary effort is normal.      Breath sounds: Normal breath sounds. No wheezing or rhonchi. Abdominal:      General: Abdomen is flat. Bowel sounds are normal. There is no distension. Palpations: Abdomen is soft. Tenderness: There is no abdominal tenderness. Musculoskeletal:         General: No swelling. Normal range of motion. Cervical back: Normal range of motion and neck supple. No tenderness. Skin:     General: Skin is warm and dry. Neurological:      General: No focal deficit present. Mental Status: He is alert and oriented to person, place, and time. Mental status is at baseline. Psychiatric:         Mood and Affect: Mood normal.         Behavior: Behavior normal.        Medical problems and test results were reviewed with the patient today.        Lab Results   Component Value Date    CHOL 161 09/19/2022    CHOL 164 04/12/2022    CHOL 163 01/04/2022     Lab Results   Component Value Date    TRIG 145 09/19/2022    TRIG 195 (H) 04/12/2022    TRIG 202 (H) 01/04/2022     Lab Results   Component Value Date    HDL 73 (H) 09/19/2022    HDL 49 04/12/2022    HDL 52 01/04/2022     Lab Results   Component Value Date    LDLCALC 59 09/19/2022    LDLCALC 82 04/12/2022    LDLCALC 77 01/04/2022     Lab Results   Component Value Date    LABVLDL 29 (H) 09/19/2022    LABVLDL 43 (H) 06/30/2020    LABVLDL 43 (H) 12/31/2019    VLDL 33 04/12/2022    VLDL 34 01/04/2022    VLDL 28 08/17/2021     Lab Results   Component Value Date    CHOLHDLRATIO 2.2 09/19/2022        Lab Results   Component Value Date/Time     09/19/2022 12:25 PM    K 4.0 09/19/2022 12:25 PM     09/19/2022 12:25 PM    CO2 24 09/19/2022 12:25 PM    BUN 13 09/19/2022 12:25 PM    CREATININE 1.10 09/19/2022 12:25 PM    GLUCOSE 96 09/19/2022 12:25 PM    CALCIUM 10.0 09/19/2022 12:25 PM No results for input(s): WBC, HGB, HCT, MCV, PLT in the last 720 hours. Lab Results   Component Value Date    LABA1C 5.8 (H) 04/12/2022     Lab Results   Component Value Date     04/12/2022        No results found for: BNP     Lab Results   Component Value Date    TSH 2.040 04/27/2021        Results for orders placed or performed in visit on 11/29/22   EKG 12 lead    Impression    Sinus  Rhythm 76 bpm  Rightward P/QRS axis and rotation -possible pulmonary disease. Normal intervals  Normal ST and T waves        ASSESSMENT and PLAN        Diagnoses and all orders for this visit:      SOB/ECRDA/ worsening fatigue- normal walking/[place] nuclear stress and echo April 2020 and April 2022 - continue overnight O2, continue inhalers as prescribed but needs to be compliant with Symbicort 2 puffs twice daily every day-  Dr Monika Olsen following at [facility]. See  Below. S/P CABG (coronary artery bypass graft)- see above, [facility] [place] 9/2018 ago, no PCI needed, normal LV function. Coronary artery disease involving native coronary artery of native heart without angina pectoris- stable, continue meds- ischemic workup negative as above. If CP worsening/new onset in near future,  consider repeat [facility]       Essential hypertension- increased losartan to 100mg daily - Continue with low sodium diet as tolerated. Discussed lifestyle modification with plans for low carb/low sugar/low fat diet. Try to  eat more lean protein, vegetables, and salads. Try to get at least 20-30min moderate intensity cardiovascular exercise at least 4-5 times weekly as tolerated with goal of weight loss in the next year. Palpitations- benign PAC's and PVC's by E-cardio -none recent, continue toprol XL 25 mg daily, increase to 50 mg daily as needed for recurrent palpitations. Non-rheumatic mitral regurgitation- stable, continue meds- minimal MR and TR on echo, normal PA pressures- echo in 6 months to 1 year.        Gastroesophageal reflux disease without esophagitis- stable, continue meds      Chronic obstructive pulmonary disease(HCC)-stable dyspnea on exertion but bothering him, not compliant with Symbicort. Encouraged twice daily usage of Symbicort 2 puffs with a rescue inhaler  in between. He will start today. Seeing pulmonary in August.      Mixed hyperlipidemia- stable, continue meds- excellent lipid/liver profile-  emphasized diet/exercise, continue statin, follow up per PCP      Obstructive sleep apnea- encouraged him to find a mask he can tolerate long term, he will work on it with Dr. Fariba Ahumada. Using O2 at night. Return in about 6 months (around 5/29/2023).          Pennie Castro MD  11/29/2022  10:28 AM

## 2022-11-28 NOTE — TELEPHONE ENCOUNTER
Patient states he feels like he has a blood clot in his leg.  Has a appointment tomorrow but would like to be worked in today/

## 2022-11-29 ENCOUNTER — OFFICE VISIT (OUTPATIENT)
Dept: CARDIOLOGY CLINIC | Age: 72
End: 2022-11-29
Payer: MEDICARE

## 2022-11-29 VITALS
HEIGHT: 66 IN | DIASTOLIC BLOOD PRESSURE: 75 MMHG | WEIGHT: 189 LBS | SYSTOLIC BLOOD PRESSURE: 130 MMHG | BODY MASS INDEX: 30.37 KG/M2 | HEART RATE: 76 BPM

## 2022-11-29 DIAGNOSIS — Z95.1 S/P CABG (CORONARY ARTERY BYPASS GRAFT): ICD-10-CM

## 2022-11-29 DIAGNOSIS — J44.9 CHRONIC OBSTRUCTIVE PULMONARY DISEASE, UNSPECIFIED COPD TYPE (HCC): Chronic | ICD-10-CM

## 2022-11-29 DIAGNOSIS — I51.7 LVH (LEFT VENTRICULAR HYPERTROPHY): ICD-10-CM

## 2022-11-29 DIAGNOSIS — E78.2 MIXED HYPERLIPIDEMIA: Chronic | ICD-10-CM

## 2022-11-29 DIAGNOSIS — G47.33 OSA (OBSTRUCTIVE SLEEP APNEA): Chronic | ICD-10-CM

## 2022-11-29 DIAGNOSIS — I10 ESSENTIAL HYPERTENSION: Chronic | ICD-10-CM

## 2022-11-29 DIAGNOSIS — I25.10 CORONARY ARTERY DISEASE INVOLVING NATIVE CORONARY ARTERY OF NATIVE HEART WITHOUT ANGINA PECTORIS: Primary | ICD-10-CM

## 2022-11-29 DIAGNOSIS — I34.0 NONRHEUMATIC MITRAL VALVE REGURGITATION: ICD-10-CM

## 2022-11-29 PROCEDURE — 1123F ACP DISCUSS/DSCN MKR DOCD: CPT | Performed by: INTERNAL MEDICINE

## 2022-11-29 PROCEDURE — 3017F COLORECTAL CA SCREEN DOC REV: CPT | Performed by: INTERNAL MEDICINE

## 2022-11-29 PROCEDURE — 99214 OFFICE O/P EST MOD 30 MIN: CPT | Performed by: INTERNAL MEDICINE

## 2022-11-29 PROCEDURE — 93000 ELECTROCARDIOGRAM COMPLETE: CPT | Performed by: INTERNAL MEDICINE

## 2022-11-29 PROCEDURE — 3078F DIAST BP <80 MM HG: CPT | Performed by: INTERNAL MEDICINE

## 2022-11-29 PROCEDURE — 3023F SPIROM DOC REV: CPT | Performed by: INTERNAL MEDICINE

## 2022-11-29 PROCEDURE — G8427 DOCREV CUR MEDS BY ELIG CLIN: HCPCS | Performed by: INTERNAL MEDICINE

## 2022-11-29 PROCEDURE — G8484 FLU IMMUNIZE NO ADMIN: HCPCS | Performed by: INTERNAL MEDICINE

## 2022-11-29 PROCEDURE — 1036F TOBACCO NON-USER: CPT | Performed by: INTERNAL MEDICINE

## 2022-11-29 PROCEDURE — 3074F SYST BP LT 130 MM HG: CPT | Performed by: INTERNAL MEDICINE

## 2022-11-29 PROCEDURE — G8417 CALC BMI ABV UP PARAM F/U: HCPCS | Performed by: INTERNAL MEDICINE

## 2022-11-29 ASSESSMENT — ENCOUNTER SYMPTOMS: SHORTNESS OF BREATH: 1

## 2023-01-06 ENCOUNTER — OFFICE VISIT (OUTPATIENT)
Dept: INTERNAL MEDICINE CLINIC | Facility: CLINIC | Age: 73
End: 2023-01-06

## 2023-01-06 VITALS
HEART RATE: 77 BPM | HEIGHT: 66 IN | WEIGHT: 188.4 LBS | SYSTOLIC BLOOD PRESSURE: 120 MMHG | DIASTOLIC BLOOD PRESSURE: 68 MMHG | OXYGEN SATURATION: 95 % | BODY MASS INDEX: 30.28 KG/M2

## 2023-01-06 DIAGNOSIS — Z00.00 MEDICARE ANNUAL WELLNESS VISIT, SUBSEQUENT: Primary | ICD-10-CM

## 2023-01-06 DIAGNOSIS — J44.1 CHRONIC OBSTRUCTIVE PULMONARY DISEASE WITH ACUTE EXACERBATION (HCC): ICD-10-CM

## 2023-01-06 RX ORDER — AZITHROMYCIN 250 MG/1
250 TABLET, FILM COATED ORAL SEE ADMIN INSTRUCTIONS
Qty: 6 TABLET | Refills: 0 | Status: SHIPPED | OUTPATIENT
Start: 2023-01-06 | End: 2023-01-11

## 2023-01-06 ASSESSMENT — PATIENT HEALTH QUESTIONNAIRE - PHQ9
2. FEELING DOWN, DEPRESSED OR HOPELESS: 0
SUM OF ALL RESPONSES TO PHQ QUESTIONS 1-9: 0
1. LITTLE INTEREST OR PLEASURE IN DOING THINGS: 0
SUM OF ALL RESPONSES TO PHQ9 QUESTIONS 1 & 2: 0

## 2023-01-06 ASSESSMENT — LIFESTYLE VARIABLES
HOW OFTEN DO YOU HAVE A DRINK CONTAINING ALCOHOL: 2-3 TIMES A WEEK
HOW MANY STANDARD DRINKS CONTAINING ALCOHOL DO YOU HAVE ON A TYPICAL DAY: 1 OR 2

## 2023-01-06 NOTE — PROGRESS NOTES
PROGRESS NOTE    SUBJECTIVE:   Lala Hopper is a 71 y.o. male seen for a follow up visit for   Chief Complaint   Patient presents with    Medicare AWV     HPI  Increase mucus production, pale yellow, wheezing, cough frequently. Reviewed and updated this visit by provider:  Tobacco  Allergies  Meds  Problems  Med Hx  Surg Hx  Fam Hx         Review of Systems   Constitutional:  Negative for chills, fatigue and fever. Respiratory:  Positive for cough and shortness of breath. Negative for chest tightness and wheezing. Increase in expectorated respiratory secretions   Cardiovascular:  Negative for chest pain, palpitations and leg swelling. Gastrointestinal:  Negative for abdominal pain. Endocrine: Negative for polydipsia, polyphagia and polyuria. Genitourinary:  Negative for frequency. Musculoskeletal:  Negative for gait problem. Skin:  Negative for rash. Neurological:  Negative for weakness, numbness and headaches. Psychiatric/Behavioral:  Negative for dysphoric mood. The patient is not nervous/anxious. OBJECTIVE:    /68 (Site: Left Upper Arm, Position: Sitting, Cuff Size: Small Adult)   Pulse 77   Ht 5' 6\" (1.676 m)   Wt 188 lb 6.4 oz (85.5 kg)   SpO2 95%   BMI 30.41 kg/m²      Physical Exam  Vitals and nursing note reviewed. Constitutional:       Appearance: Normal appearance. HENT:      Head: Normocephalic. Right Ear: Decreased hearing noted. Left Ear: Decreased hearing noted. Mouth/Throat:      Lips: Pink. Mouth: Mucous membranes are moist.   Eyes:      General: Lids are normal.   Neck:      Vascular: No carotid bruit. Cardiovascular:      Rate and Rhythm: Normal rate and regular rhythm. Pulses:           Carotid pulses are 2+ on the right side and 2+ on the left side. Posterior tibial pulses are 2+ on the right side and 2+ on the left side. Heart sounds: Murmur heard. Systolic (faint) murmur is present.    Pulmonary: Effort: Pulmonary effort is normal.      Breath sounds: Examination of the right-lower field reveals decreased breath sounds. Examination of the left-lower field reveals decreased breath sounds. Decreased breath sounds present. Musculoskeletal:      Cervical back: Neck supple. Right lower leg: No edema. Left lower leg: No edema. Skin:     General: Skin is warm and dry. Neurological:      Mental Status: He is alert and oriented to person, place, and time. Mental status is at baseline. Gait: Gait normal.   Psychiatric:         Mood and Affect: Mood normal.         Behavior: Behavior normal.        ASSESSMENT and PLAN    1. Medicare annual wellness visit, subsequent  2. Chronic obstructive pulmonary disease with acute exacerbation (HCC)  -     azithromycin (ZITHROMAX) 250 MG tablet; Take 1 tablet by mouth See Admin Instructions for 5 days 500mg on day 1 followed by 250mg on days 2 - 5, Disp-6 tablet, R-0Normal      Return for Medicare Annual Wellness Visit in 1 year   follow up regular appointment. the following changes in treatment are made: as above  reviewed medications and side effects in detail      MAR Sam - NP    Dictated using voice recognition software.  Proofread, but unrecognized voice recognition errors may exist.

## 2023-01-06 NOTE — PATIENT INSTRUCTIONS
Learning About Vision Tests  What are vision tests? The four most common vision tests are visual acuity tests, refraction, visual field tests, and color vision tests. Visual acuity (sharpness) tests  These tests are used: To see if you need glasses or contact lenses. To monitor an eye problem. To check an eye injury. Visual acuity tests are done as part of routine exams. You may also have this test when you get your 's license or apply for some types of jobs. Visual field tests  These tests are used: To check for vision loss in any area of your range of vision. To screen for certain eye diseases. To look for nerve damage after a stroke, head injury, or other problem that could reduce blood flow to the brain. Refraction and color tests  A refraction test is done to find the right prescription for glasses and contact lenses. A color vision test is done to check for color blindness. Color vision is often tested as part of a routine exam. You may also have this test when you apply for a job where recognizing different colors is important, such as , electronics, or the [employer]. How are vision tests done? Visual acuity test   You cover one eye at a time. You read aloud from a wall chart across the room. You read aloud from a small card that you hold in your hand. Refraction   You look into a special device. The device puts lenses of different strengths in front of each eye to see how strong your glasses or contact lenses need to be. Visual field tests   Your doctor may have you look through special machines. Or your doctor may simply have you stare straight ahead while they move a finger into and out of your field of vision. Color vision test   You look at pieces of printed test patterns in various colors. You say what number or symbol you see. Your doctor may have you trace the number or symbol using a pointer. How do these tests feel?   There is very little chance of having a problem from this test. If dilating drops are used for a vision test, they may make the eyes sting and cause a medicine taste in the mouth. Follow-up care is a key part of your treatment and safety. Be sure to make and go to all appointments, and call your doctor if you are having problems. It's also a good idea to know your test results and keep a list of the medicines you take. Where can you learn more? Go to http://www.santiago.com/ and enter G551 to learn more about \"Learning About Vision Tests. \"  Current as of: October 12, 2022               Content Version: 13.5  © 6282-4788 [employer]. Care instructions adapted under license by [facility]. If you have questions about a medical condition or this instruction, always ask your healthcare professional. [address] any warranty or liability for your use of this information. Learning About Getting In and Out of a Bathtub Safely  Introduction  Many falls happen during bathing. All that water makes the bathroom a slippery place. You may no longer be able to step over the tub wall comfortably and safely. You might lean on things that aren't meant to support your weight, like a towel bar or the shower curtain. There are several types of aids that will help keep you safe. You can buy them at [employer] or home improvement stores or online. What tools can help you get in and out of a tub? Tub rail and grab bar  There are many types of bars and rails you can install on the walls next to your tub or on the edge of the tub. They will help keep you safe while you're getting in or out of the tub. It's important to have them permanently installed, rather than attached with suction. Transfer bench  There are several kinds of benches or seats to use in the bathtub. One type sits in the tub and extends out over the side. You sit on the bench.  Lift one leg at a time into the tub, sliding your body over as you do so. Another common tub bench sits inside the tub. To use this type you need to be able to safely step into the tub before you sit down. Nonskid mats  Water makes both the floor of the tub and the bathroom floor slippery. You can buy adhesive strips that stick to the floor of the tub. Or you can use a nonskid tub mat. Outside the tub, make sure you use a rug with a nonskid bottom. Don't use a plain towel. Hand-held shower faucet  With a hand-held faucet, you can get clean without having to lower yourself into the tub. Hang a shower curtain to keep water from spilling out onto the floor. Follow-up care is a key part of your treatment and safety. Be sure to make and go to all appointments, and call your doctor if you are having problems. It's also a good idea to know your test results and keep a list of the medicines you take. Current as of: March 9, 2022               Content Version: 13.5  © 2006-2022 [employer]. Care instructions adapted under license by [facility] ([facility]). If you have questions about a medical condition or this instruction, always ask your healthcare professional. [place] [address] any warranty or liability for your use of this information. Advance Directives: Care Instructions  Overview  An advance directive is a legal way to state your wishes at the end of your life. It tells your family and your doctor what to do if you can't say what you want. There are two main types of advance directives. You can change them any time your wishes change. Living will. This form tells your family and your doctor your wishes about life support and other treatment. The form is also called a declaration. Medical power of . This form lets you name a person to make treatment decisions for you when you can't speak for yourself. This person is called a health care agent (health care proxy, health care surrogate).  The form is also called a durable power of  for health care. If you do not have an advance directive, decisions about your medical care may be made by a family member, or by a doctor or a  who doesn't know you. It may help to think of an advance directive as a gift to the people who care for you. If you have one, they won't have to make tough decisions by themselves. For more information, including forms for your state, see the [address] website (www.caringinfo.org/planning/advance-directives/). Follow-up care is a key part of your treatment and safety. Be sure to make and go to all appointments, and call your doctor if you are having problems. It's also a good idea to know your test results and keep a list of the medicines you take. What should you include in an advance directive? Many states have a unique advance directive form. (It may ask you to address specific issues.) Or you might use a universal form that's approved by many states. If your form doesn't tell you what to address, it may be hard to know what to include in your advance directive. Use the questions below to help you get started. Who do you want to make decisions about your medical care if you are not able to? What life-support measures do you want if you have a serious illness that gets worse over time or can't be cured? What are you most afraid of that might happen? (Maybe you're afraid of having pain, losing your independence, or being kept alive by machines.)  Where would you prefer to die? (Your home? A hospital? A nursing home?)  Do you want to donate your organs when you die? Do you want certain Mu-ism practices performed before you die? When should you call for help? Be sure to contact your doctor if you have any questions. Where can you learn more? Go to http://www.dotloop.com/ and enter R264 to learn more about \"Advance Directives: Care Instructions. \"  Current as of: June 16, 2022               Content Version: 13.5  © 3068-5341 [employer]. Care instructions adapted under license by [facility] ([facility]). If you have questions about a medical condition or this instruction, always ask your healthcare professional. [address] any warranty or liability for your use of this information. Personalized Preventive Plan for Roxanna Mayes - 1/6/2023  Medicare offers a range of preventive health benefits. Some of the tests and screenings are paid in full while other may be subject to a deductible, co-insurance, and/or copay. Some of these benefits include a comprehensive review of your medical history including lifestyle, illnesses that may run in your family, and various assessments and screenings as appropriate. After reviewing your medical record and screening and assessments performed today your provider may have ordered immunizations, labs, imaging, and/or referrals for you. A list of these orders (if applicable) as well as your Preventive Care list are included within your After Visit Summary for your review. Other Preventive Recommendations:    A preventive eye exam performed by an eye specialist is recommended every 1-2 years to screen for glaucoma; cataracts, macular degeneration, and other eye disorders. A preventive dental visit is recommended every 6 months. Try to get at least 150 minutes of exercise per week or 10,000 steps per day on a pedometer . Order or download the FREE \"Exercise & Physical Activity: Your Everyday Guide\" from [employer]. Call 0-213.760.4587 or search [employer] online. You need 2938-1918 mg of calcium and 7291-6788 IU of vitamin D per day. It is possible to meet your calcium requirement with diet alone, but a vitamin D supplement is usually necessary to meet this goal.  When exposed to the sun, use a sunscreen that protects against both UVA and UVB radiation with an SPF of 30 or greater.  Reapply every 2 to 3 hours or after sweating, drying off with a towel, or swimming. Always wear a seat belt when traveling in a car. Always wear a helmet when riding a bicycle or motorcycle.

## 2023-01-06 NOTE — PROGRESS NOTES
Medicare Annual Wellness Visit    Alfredo Ta is here for Medicare AWV    Assessment & Plan   Medicare annual wellness visit, subsequent      Recommendations for Preventive Services Due: see orders and patient instructions/AVS.  Recommended screening schedule for the next 5-10 years is provided to the patient in written form: see Patient Instructions/AVS.     Return for Medicare Annual Wellness Visit in 1 year. Subjective   The following acute and/or chronic problems were also addressed today:  Increased cough and respiratory secretions    Patient's complete Health Risk Assessment and screening values have been reviewed and are found in Flowsheets. The following problems were reviewed today and where indicated follow up appointments were made and/or referrals ordered. Positive Risk Factor Screenings with Interventions:                 Weight and Activity:  Physical Activity: Insufficiently Active    Days of Exercise per Week: 2 days    Minutes of Exercise per Session: 30 min     On average, how many days per week do you engage in moderate to strenuous exercise (like a brisk walk)?: 2 days  Have you lost any weight without trying in the past 3 months?: No  Body mass index: (!) 30.41    Obesity Interventions:  Not obese. He is weighed with shoes and clothes on. Vision Screen:  Do you have difficulty driving, watching TV, or doing any of your daily activities because of your eyesight?: No  Have you had an eye exam within the past year?: (!) No  No results found. Interventions:   Patient encouraged to make appointment with their eye specialist    Safety:  Do you have either shower bars, grab bars, non-slip mats or non-slip surfaces in your shower or bathtub?: (!) No  Interventions:  Discussed use of non-slip shower mat and non-slip mat outside of the shower.                      Objective   Vitals:    01/06/23 0909   BP: 120/68   Site: Left Upper Arm   Position: Sitting   Cuff Size: Small Adult Pulse: 77   SpO2: 95%   Weight: 188 lb 6.4 oz (85.5 kg)   Height: 5' 6\" (1.676 m)      Body mass index is 30.41 kg/m². Allergies   Allergen Reactions    Codeine Rash     Tolerates Percocet without side- effects     Prior to Visit Medications    Medication Sig Taking?  Authorizing Provider   amLODIPine (NORVASC) 2.5 MG tablet Take 1 tablet by mouth daily  Patient taking differently: Take 5 mg by mouth daily Yes Darryl Schooling, APRN - NP   OXYGEN 2lpm qhs Yes Ar Automatic Reconciliation   albuterol sulfate  (90 Base) MCG/ACT inhaler Inhale 2 puffs into the lungs every 4 hours as needed Yes Ar Automatic Reconciliation   aspirin 81 MG EC tablet Take by mouth daily Yes Ar Automatic Reconciliation   budesonide-formoterol (SYMBICORT) 160-4.5 MCG/ACT AERO Inhale 2 puffs into the lungs 2 times daily Yes Ar Automatic Reconciliation   cyanocobalamin 1000 MCG tablet Take 1,000 mcg by mouth daily Yes Ar Automatic Reconciliation   fluticasone (FLONASE) 50 MCG/ACT nasal spray 2 sprays by Nasal route Yes Ar Automatic Reconciliation   ibuprofen (ADVIL;MOTRIN) 800 MG tablet Take 800 mg by mouth every 6 hours as needed Yes Ar Automatic Reconciliation   losartan (COZAAR) 100 MG tablet Take 100 mg by mouth daily Yes Ar Automatic Reconciliation   metoprolol succinate (TOPROL XL) 25 MG extended release tablet Take 25 mg by mouth daily Yes Ar Automatic Reconciliation   nitroGLYCERIN (NITROSTAT) 0.3 MG SL tablet Place 0.3 mg under the tongue Yes Ar Automatic Reconciliation   omeprazole (PRILOSEC) 40 MG delayed release capsule Take 40 mg by mouth Yes Ar Automatic Reconciliation   rosuvastatin (CRESTOR) 40 MG tablet Take 40 mg by mouth Yes Ar Automatic Reconciliation   tamsulosin (FLOMAX) 0.4 MG capsule Take 0.4 mg by mouth Yes Ar Automatic Reconciliation   tiotropium (SPIRIVA RESPIMAT) 2.5 MCG/ACT AERS inhaler Inhale 2 puffs into the lungs daily Yes Ar Automatic Reconciliation   vitamin E 100 units capsule Take by mouth daily  Patient not taking: Reported on 1/6/2023  Ar Automatic Reconciliation       CareTeam (Including outside providers/suppliers regularly involved in providing care):   Patient Care Team:  MAR Carvajal NP as PCP - General  MAR Carvajal NP as PCP - [facility] Empaneled Provider  Marshal Miles MD as Physician     Reviewed and updated this visit:  Tobacco  Allergies  Meds  Problems  Med Hx  Surg Hx  Soc Hx  Fam Hx

## 2023-01-29 ASSESSMENT — ENCOUNTER SYMPTOMS
SHORTNESS OF BREATH: 1
WHEEZING: 0
COUGH: 1
ABDOMINAL PAIN: 0
CHEST TIGHTNESS: 0

## 2023-02-09 NOTE — PROGRESS NOTES
[address] [place] [address] Court, [address] [facility]  [phone]    Patient Name:  Nika Dial  YOB: 1950      Office Visit 2/13/2023    CHIEF COMPLAINT:    Chief Complaint   Patient presents with    Sleep Apnea         HISTORY OF PRESENT ILLNESS: This patient was seen today for follow-up of sleep apnea and nocturnal hypoxemia. He had home sleep study done last year and his AHI was 18.1/hour and the lowest oxygen saturation was 75%. He has history of COPD which is being treated with a combination of Symbicort and Spiriva Respimat. He is followed by Dr. Ken Cabello. He quit smoking in 1997 after had 60-pack-year history of smoking. He had rare heterozygous mutation of alpha-1 antitrypsin deficiency. He is currently using oxygen at 2 L/min. He indicated he had tried different type of CPAP mask in the past but none of them was successful he tried all different type of nasal and fullface mask and he could not tolerate either type. He was wondering about other option to treat his sleep apnea. He uses dentures on a regular basis which make him not eligible for oral appliance. We discussed the eligibility criteria for inspire device and explained to him the mechanism and how it works and with the process to consider it including evaluation by ENT which required drug-induced sleep endoscopy to see if he is deemed to be candidate or not. He was hesitant about it after he learned about the procedure. He is going to discuss that with his wife. In the meantime we will consider send him to ENT for further evaluation. He was encouraged to continue sleep hygiene and positional therapy and using his oxygen at 2 L/min as recommended by pulmonary.         Past Medical History:   Diagnosis Date    Abnormal cardiovascular function study 1/26/2016    Adenoma of left adrenal gland     Arthritis     general    BPH (benign prostatic hyperplasia)     Chest discomfort 1/26/2016 Chronic kidney disease     Chronic low back pain     Chronic pain     back, hip    COPD (chronic obstructive pulmonary disease) (HCC)     mild per pt; no rescue inhaler; denies SOB with 1 flight of steps    Coronary artery disease     had CABG 9/14/15 s/p failed stress test    ED (erectile dysfunction)     GERD (gastroesophageal reflux disease)     controlled with med    History of basal cell cancer     Hypertension     controlled with med    Left atrial dilation 10/2016    LVH (left ventricular hypertrophy) 02/2018    Mitral valve regurgitation 10/2016    1+    Mixed hyperlipidemia     Murmur, cardiac     soft 1/6 MR murmur per cardiologist note 11-10-17 : per echo 10-24-16 \"mild mitral regurg\"    Nausea & vomiting     responds to IV antiemetic    Palpitations     Pleural effusion exudative 9/30/15    t-cent on left    Prostatic hypertrophy, benign 1/26/2016    Sleep apnea     pt reports mild- no c-pap    Status post right hip replacement 1/12/2018    Tricuspid valvular regurgitation          Patient Active Problem List   Diagnosis    Left atrial dilation    Fasting hyperglycemia    Impotence of organic origin    S/P prosthetic total arthroplasty of the hip    Vitamin B 12 deficiency    GERD (gastroesophageal reflux disease)    Chronic fatigue    Chest discomfort    Tachycardia    Umbilical hernia without obstruction and without gangrene    Noncompliance with CPAP treatment    Claustrophobia    Shortness of breath    Primary localized osteoarthrosis, lower leg    History of tobacco abuse    Alpha-1-antitrypsin deficiency (HCC)    Arthritis    Intermittent palpitations    Mixed hyperlipidemia    Mitral regurgitation    CAD (coronary artery disease)    Mitral valve regurgitation    Tricuspid valvular regurgitation    Abnormal cardiovascular function study    Status post right hip replacement    LVH (left ventricular hypertrophy)    Essential hypertension    H/O total hip arthroplasty, right    COPD (chronic obstructive pulmonary disease) (HCC)    History of adenomatous polyp of colon    Inguinal hernia, left    Snoring    CAROLINA (obstructive sleep apnea)    Lumbar stenosis with neurogenic claudication    Chronic pain    Lumbago    S/P CABG (coronary artery bypass graft)    Benign prostatic hyperplasia    Hx of adenomatous polyp of colon    Difficulty with CPAP use    Nocturnal hypoxemia          Past Surgical History:   Procedure Laterality Date    COLONOSCOPY  2011    tubular adenoma    HEENT Bilateral 2003    Lasik    LUMBAR FUSION  2013    Laminectomy & fusion L4/L5    MOHS SURGERY Left     ORTHOPEDIC SURGERY  2017    L3, L4, L5 and S1 fusion with instrumentation, Dr Aliyah Belcher      thoracentesis    [address] Bilateral     IH    CO UNLISTED PROCEDURE CARDIAC SURGERY  2015    CABG    REFRACTIVE SURGERY      bilateral     TOTAL HIP ARTHROPLASTY Right 2018    Dr Sean Sullivan Left 3/2014       [unfilled]        Social History     Socioeconomic History    Marital status:      Spouse name: Not on file    Number of children: Not on file    Years of education: Not on file    Highest education level: Not on file   Occupational History    Not on file   Tobacco Use    Smoking status: Former     Packs/day: 2.00     Years: 40.00     Pack years: 80.00     Types: Cigarettes     Quit date: 1997     Years since quittin.0    Smokeless tobacco: Never   Substance and Sexual Activity    Alcohol use:  Yes     Alcohol/week: 5.0 standard drinks    Drug use: No    Sexual activity: Not on file   Other Topics Concern    Not on file   Social History Narrative    Not on file     Social Determinants of Health     Financial Resource Strain: Not on file   Food Insecurity: Not on file   Transportation Needs: Not on file   Physical Activity: Insufficiently Active    Days of Exercise per Week: 2 days    Minutes of Exercise per Session: 30 min   Stress: Not on file   Social Connections: Not on file   Intimate Partner Violence: Not on file   Housing Stability: Not on file         Family History   Problem Relation Age of Onset    Lung Disease Brother     Heart Disease Brother     Heart Disease Father     Hypertension Father     Stroke Father     Heart Disease Mother     Heart Disease Brother     Heart Disease Sister     Cancer Sister         Pancrease    Cancer Brother         lung    Lung Disease Sister          Allergies   Allergen Reactions    Codeine Rash     Tolerates Percocet without side- effects         Current Outpatient Medications   Medication Sig    amLODIPine (NORVASC) 2.5 MG tablet Take 1 tablet by mouth daily (Patient taking differently: Take 5 mg by mouth daily)    OXYGEN 2lpm qhs    albuterol sulfate  (90 Base) MCG/ACT inhaler Inhale 2 puffs into the lungs every 4 hours as needed    aspirin 81 MG EC tablet Take by mouth daily    budesonide-formoterol (SYMBICORT) 160-4.5 MCG/ACT AERO Inhale 2 puffs into the lungs 2 times daily    cyanocobalamin 1000 MCG tablet Take 1,000 mcg by mouth daily    fluticasone (FLONASE) 50 MCG/ACT nasal spray 2 sprays by Nasal route    ibuprofen (ADVIL;MOTRIN) 800 MG tablet Take 800 mg by mouth every 6 hours as needed    losartan (COZAAR) 100 MG tablet Take 100 mg by mouth daily    metoprolol succinate (TOPROL XL) 25 MG extended release tablet Take 25 mg by mouth daily    nitroGLYCERIN (NITROSTAT) 0.3 MG SL tablet Place 0.3 mg under the tongue    omeprazole (PRILOSEC) 40 MG delayed release capsule Take 40 mg by mouth    rosuvastatin (CRESTOR) 40 MG tablet Take 40 mg by mouth    tamsulosin (FLOMAX) 0.4 MG capsule Take 0.4 mg by mouth    tiotropium (SPIRIVA RESPIMAT) 2.5 MCG/ACT AERS inhaler Inhale 2 puffs into the lungs daily     No current facility-administered medications for this visit.            REVIEW OF SYSTEMS:   CONSTITUTIONAL:   There is no history of fever, chills, night sweats, weight loss, weight gain, persistent fatigue, or lethargy/hypersomnolence. CARDIAC:   No chest pain, pressure, discomfort, palpitations, orthopnea, murmurs, or edema. GI:   No dysphagia, heartburn reflux, nausea/vomiting, diarrhea, abdominal pain, or bleeding. NEURO:   There is no history of AMS, persistent headache, decreased level of consciousness, seizures, or motor or sensory deficits. PHYSICAL EXAM:    Vitals:    02/13/23 1128   BP: 131/73   Pulse: 71   Temp: 97 °F (36.1 °C)   SpO2: 95%        GENERAL APPEARANCE:   The patient is normal weight and in no respiratory distress. HEENT:   PERRL. Conjunctivae unremarkable. Nasal mucosa is without epistaxis, exudate, or polyps. Gums and dentition are unremarkable. There is severe oropharyngeal narrowing. NECK/LYMPHATIC:   Symmetrical with no elevation of jugular venous pulsation. Trachea midline. No thyroid enlargement. No cervical adenopathy. LUNGS:   Normal respiratory effort with symmetrical lung expansion. Breath sounds are clear bilaterally. HEART:   There is a regular rate and rhythm. No murmur, rub, or gallop. There is no edema in the lower extremities. ABDOMEN:   Soft and non-tender. No hepatosplenomegaly. Bowel sounds are normal.     NEURO:   The patient is alert and oriented to person, place, and time. Memory appears intact and mood is normal.  No gross sensorimotor deficits are present. ASSESSMENT:  (Medical Decision Making)      Diagnosis Orders   1. CAROLINA (obstructive sleep apnea), moderate and he was not able to tolerate CPAP in spite of multiple attempts in the past.  He is not a candidate for oral appliance either since he use dentures. We will consider inspire device and will send him for ENT for further evaluation. Ambulatory referral to ENT      2. Difficulty with CPAP use, limit treatment option for his sleep apnea Ambulatory referral to ENT      3.  Nocturnal hypoxemia, currently on oxygen at 2 L/min PLAN:    The patient will be referred to ENT to see Dr. Flavio Trotter for evaluation of inspire device    Sleep hygiene are strongly recommended along with positional therapy    Continue oxygen at 2 L/min with sleep    Continue his follow-up and recommendation given to him with pulmonary    Maintain his follow-up with a primary care provider    Return to the sleep center in 4 months or sooner if needed    All questions regarding inspire device were discussed with him and his wife in great detail      Orders Placed This Encounter   Procedures    Ambulatory referral to ENT     Referral Priority:   Urgent     Referral Type:   Consult for Advice and Opinion     Referral Reason:   Specialty Services Required     Referred to Provider:   Igor Pate MD     Requested Specialty:   Otolaryngology     Number of Visits Requested:   1        No orders of the defined types were placed in this encounter. Over 50% of today's office visit was spent in face to face time reviewing test results, prognosis, importance of compliance, education about disease process, benefits of medications, instructions for management of acute flare-ups, and follow up plans. Total face to face time spent with patient including charting was 35 minutes.         Roxann Alcantara MD  Electronically signed

## 2023-02-10 ENCOUNTER — TELEPHONE (OUTPATIENT)
Dept: SLEEP MEDICINE | Age: 73
End: 2023-02-10

## 2023-02-10 ENCOUNTER — NURSE ONLY (OUTPATIENT)
Dept: PULMONOLOGY | Age: 73
End: 2023-02-10

## 2023-02-10 DIAGNOSIS — J44.9 CHRONIC OBSTRUCTIVE PULMONARY DISEASE, UNSPECIFIED COPD TYPE (HCC): ICD-10-CM

## 2023-02-10 DIAGNOSIS — J47.9 BRONCHIECTASIS, UNCOMPLICATED (HCC): Primary | ICD-10-CM

## 2023-02-10 LAB
FEF25-27, POC: 1.04 L/S
FET, POC: NORMAL
FEV 1 , POC: 1.86 L
FEV1/FVC, POC: NORMAL
FVC, POC: NORMAL
LUNG AGE, POC: NORMAL
PEF, POC: 4.98 L/S

## 2023-02-13 ENCOUNTER — OFFICE VISIT (OUTPATIENT)
Dept: SLEEP MEDICINE | Age: 73
End: 2023-02-13
Payer: MEDICARE

## 2023-02-13 ENCOUNTER — TELEPHONE (OUTPATIENT)
Dept: SLEEP MEDICINE | Age: 73
End: 2023-02-13

## 2023-02-13 VITALS
WEIGHT: 189 LBS | OXYGEN SATURATION: 95 % | BODY MASS INDEX: 30.37 KG/M2 | DIASTOLIC BLOOD PRESSURE: 73 MMHG | HEIGHT: 66 IN | TEMPERATURE: 97 F | HEART RATE: 71 BPM | SYSTOLIC BLOOD PRESSURE: 131 MMHG

## 2023-02-13 DIAGNOSIS — Z78.9 DIFFICULTY WITH CPAP USE: ICD-10-CM

## 2023-02-13 DIAGNOSIS — G47.34 NOCTURNAL HYPOXEMIA: ICD-10-CM

## 2023-02-13 DIAGNOSIS — G47.33 OSA (OBSTRUCTIVE SLEEP APNEA): Primary | Chronic | ICD-10-CM

## 2023-02-13 PROCEDURE — 99214 OFFICE O/P EST MOD 30 MIN: CPT | Performed by: INTERNAL MEDICINE

## 2023-02-13 PROCEDURE — 3075F SYST BP GE 130 - 139MM HG: CPT | Performed by: INTERNAL MEDICINE

## 2023-02-13 PROCEDURE — 3017F COLORECTAL CA SCREEN DOC REV: CPT | Performed by: INTERNAL MEDICINE

## 2023-02-13 PROCEDURE — G8484 FLU IMMUNIZE NO ADMIN: HCPCS | Performed by: INTERNAL MEDICINE

## 2023-02-13 PROCEDURE — G8417 CALC BMI ABV UP PARAM F/U: HCPCS | Performed by: INTERNAL MEDICINE

## 2023-02-13 PROCEDURE — 3078F DIAST BP <80 MM HG: CPT | Performed by: INTERNAL MEDICINE

## 2023-02-13 PROCEDURE — 1036F TOBACCO NON-USER: CPT | Performed by: INTERNAL MEDICINE

## 2023-02-13 PROCEDURE — G8427 DOCREV CUR MEDS BY ELIG CLIN: HCPCS | Performed by: INTERNAL MEDICINE

## 2023-02-13 PROCEDURE — 1123F ACP DISCUSS/DSCN MKR DOCD: CPT | Performed by: INTERNAL MEDICINE

## 2023-02-13 ASSESSMENT — SLEEP AND FATIGUE QUESTIONNAIRES
HOW LIKELY ARE YOU TO NOD OFF OR FALL ASLEEP WHILE SITTING INACTIVE IN A PUBLIC PLACE: 3
HOW LIKELY ARE YOU TO NOD OFF OR FALL ASLEEP WHILE WATCHING TV: 1
HOW LIKELY ARE YOU TO NOD OFF OR FALL ASLEEP WHEN YOU ARE A PASSENGER IN A CAR FOR AN HOUR WITHOUT A BREAK: 0
HOW LIKELY ARE YOU TO NOD OFF OR FALL ASLEEP WHILE LYING DOWN TO REST IN THE AFTERNOON WHEN CIRCUMSTANCES PERMIT: 2
HOW LIKELY ARE YOU TO NOD OFF OR FALL ASLEEP WHILE SITTING AND TALKING TO SOMEONE: 0
ESS TOTAL SCORE: 7
HOW LIKELY ARE YOU TO NOD OFF OR FALL ASLEEP WHILE SITTING QUIETLY AFTER LUNCH WITHOUT ALCOHOL: 0
HOW LIKELY ARE YOU TO NOD OFF OR FALL ASLEEP WHILE SITTING AND READING: 1
HOW LIKELY ARE YOU TO NOD OFF OR FALL ASLEEP IN A CAR, WHILE STOPPED FOR A FEW MINUTES IN TRAFFIC: 0

## 2023-02-20 ENCOUNTER — HOSPITAL ENCOUNTER (OUTPATIENT)
Dept: CT IMAGING | Age: 73
Discharge: HOME | End: 2023-02-23
Payer: MEDICARE

## 2023-02-20 VITALS — BODY MASS INDEX: 30.37 KG/M2 | WEIGHT: 189 LBS | HEIGHT: 66 IN

## 2023-02-20 DIAGNOSIS — Z87.891 PERSONAL HISTORY OF TOBACCO USE: ICD-10-CM

## 2023-02-20 PROCEDURE — 71250 CT THORAX DX C-: CPT

## 2023-02-22 ENCOUNTER — TELEPHONE (OUTPATIENT)
Dept: PULMONOLOGY | Age: 73
End: 2023-02-22

## 2023-02-22 NOTE — TELEPHONE ENCOUNTER
Patient had appointment on 2/24 but had to cancel because he has to go out of town. Rescheduled to next available 6/2.   Asking if he can get results of his Low dose lung CT

## 2023-03-08 ENCOUNTER — TELEPHONE (OUTPATIENT)
Dept: INTERNAL MEDICINE CLINIC | Facility: CLINIC | Age: 73
End: 2023-03-08

## 2023-03-08 DIAGNOSIS — Z86.010 HISTORY OF ADENOMATOUS POLYP OF COLON: Primary | ICD-10-CM

## 2023-03-08 NOTE — TELEPHONE ENCOUNTER
----- Message from Theresa Montague sent at 3/3/2023 12:03 PM EST -----  Subject: Referral Request    Reason for referral request? patient would like to have a referral for   colonoscopy to be done at [facility]   Provider patient wants to be referred to(if known):     Provider Phone Number(if known): Additional Information for Provider?  please call   ---------------------------------------------------------------------------  --------------  Zackary Seals INFO    [phone]; OK to leave message on voicemail  ---------------------------------------------------------------------------  --------------

## 2023-03-10 ENCOUNTER — TELEPHONE (OUTPATIENT)
Dept: INTERNAL MEDICINE CLINIC | Facility: CLINIC | Age: 73
End: 2023-03-10

## 2023-03-10 NOTE — TELEPHONE ENCOUNTER
----- Message from Jenny Hernandez sent at 3/10/2023  3:20 PM EST -----  Subject: Message to Provider    QUESTIONS  Information for Provider? Cinthya called regarding any updates on the order   for auto immune testing that the patient requested from [employer].  ---------------------------------------------------------------------------  --------------  CALL BACK INFO  [phone]; OK to leave message on voicemail  ---------------------------------------------------------------------------  --------------  SCRIPT ANSWERS  Relationship to Patient? Third Party  Third Party Type? Other  Other Third Party Type? [facility]   Representative Name? Cinthya

## 2023-03-11 NOTE — TELEPHONE ENCOUNTER
I am not sure what he is referring to. Can he be more specific? I do not recall discussing an autoimmune test from [facility].

## 2023-04-18 LAB
ALBUMIN SERPL-MCNC: 4.3 G/DL (ref 3.7–4.7)
ALBUMIN/GLOB SERPL: 1.5 {RATIO} (ref 1.2–2.2)
ALP SERPL-CCNC: 140 IU/L (ref 44–121)
ALT SERPL-CCNC: 33 IU/L (ref 0–44)
AST SERPL-CCNC: 33 IU/L (ref 0–40)
BASOPHILS # BLD AUTO: 0.1 X10E3/UL (ref 0–0.2)
BASOPHILS NFR BLD AUTO: 1 %
BILIRUB SERPL-MCNC: 0.4 MG/DL (ref 0–1.2)
BUN SERPL-MCNC: 10 MG/DL (ref 8–27)
BUN/CREAT SERPL: 10 (ref 10–24)
CALCIUM SERPL-MCNC: 9.8 MG/DL (ref 8.6–10.2)
CHLORIDE SERPL-SCNC: 103 MMOL/L (ref 96–106)
CHOLEST SERPL-MCNC: 182 MG/DL (ref 100–199)
CO2 SERPL-SCNC: 19 MMOL/L (ref 20–29)
CREAT SERPL-MCNC: 1.04 MG/DL (ref 0.76–1.27)
EGFRCR SERPLBLD CKD-EPI 2021: 76 ML/MIN/1.73
EOSINOPHIL # BLD AUTO: 0.2 X10E3/UL (ref 0–0.4)
EOSINOPHIL NFR BLD AUTO: 3 %
ERYTHROCYTE [DISTWIDTH] IN BLOOD BY AUTOMATED COUNT: 12.2 % (ref 11.6–15.4)
GLOBULIN SER CALC-MCNC: 2.9 G/DL (ref 1.5–4.5)
GLUCOSE SERPL-MCNC: 104 MG/DL (ref 70–99)
HCT VFR BLD AUTO: 46.9 % (ref 37.5–51)
HDLC SERPL-MCNC: 68 MG/DL
HGB BLD-MCNC: 15.9 G/DL (ref 13–17.7)
IMM GRANULOCYTES # BLD AUTO: 0 X10E3/UL (ref 0–0.1)
IMM GRANULOCYTES NFR BLD AUTO: 0 %
IMP & REVIEW OF LAB RESULTS: NORMAL
LDLC SERPL CALC-MCNC: 75 MG/DL (ref 0–99)
LYMPHOCYTES # BLD AUTO: 1.5 X10E3/UL (ref 0.7–3.1)
LYMPHOCYTES NFR BLD AUTO: 24 %
MCH RBC QN AUTO: 32.9 PG (ref 26.6–33)
MCHC RBC AUTO-ENTMCNC: 33.9 G/DL (ref 31.5–35.7)
MCV RBC AUTO: 97 FL (ref 79–97)
MONOCYTES # BLD AUTO: 0.7 X10E3/UL (ref 0.1–0.9)
MONOCYTES NFR BLD AUTO: 10 %
NEUTROPHILS # BLD AUTO: 4.1 X10E3/UL (ref 1.4–7)
NEUTROPHILS NFR BLD AUTO: 62 %
PLATELET # BLD AUTO: 224 X10E3/UL (ref 150–450)
POTASSIUM SERPL-SCNC: 4.3 MMOL/L (ref 3.5–5.2)
PROT SERPL-MCNC: 7.2 G/DL (ref 6–8.5)
PSA SERPL-MCNC: 0.5 NG/ML (ref 0–4)
RBC # BLD AUTO: 4.84 X10E6/UL (ref 4.14–5.8)
SODIUM SERPL-SCNC: 138 MMOL/L (ref 134–144)
SPECIMEN STATUS REPORT: NORMAL
TRIGL SERPL-MCNC: 241 MG/DL (ref 0–149)
TSH SERPL DL<=0.005 MIU/L-ACNC: 2.19 UIU/ML (ref 0.45–4.5)
VLDLC SERPL CALC-MCNC: 39 MG/DL (ref 5–40)
WBC # BLD AUTO: 6.5 X10E3/UL (ref 3.4–10.8)

## 2023-04-24 ENCOUNTER — OFFICE VISIT (OUTPATIENT)
Dept: INTERNAL MEDICINE CLINIC | Facility: CLINIC | Age: 73
End: 2023-04-24
Payer: MEDICARE

## 2023-04-24 VITALS
OXYGEN SATURATION: 96 % | DIASTOLIC BLOOD PRESSURE: 62 MMHG | SYSTOLIC BLOOD PRESSURE: 130 MMHG | HEART RATE: 64 BPM | BODY MASS INDEX: 30.99 KG/M2 | WEIGHT: 192 LBS

## 2023-04-24 DIAGNOSIS — J44.9 CHRONIC OBSTRUCTIVE PULMONARY DISEASE, UNSPECIFIED COPD TYPE (HCC): Chronic | ICD-10-CM

## 2023-04-24 DIAGNOSIS — I10 ESSENTIAL HYPERTENSION: Chronic | ICD-10-CM

## 2023-04-24 DIAGNOSIS — E78.2 MIXED HYPERLIPIDEMIA: Primary | Chronic | ICD-10-CM

## 2023-04-24 DIAGNOSIS — R73.01 ELEVATED FASTING GLUCOSE: ICD-10-CM

## 2023-04-24 DIAGNOSIS — E88.01 ALPHA-1-ANTITRYPSIN DEFICIENCY (HCC): Chronic | ICD-10-CM

## 2023-04-24 DIAGNOSIS — G47.33 OSA (OBSTRUCTIVE SLEEP APNEA): Chronic | ICD-10-CM

## 2023-04-24 PROCEDURE — 3023F SPIROM DOC REV: CPT | Performed by: NURSE PRACTITIONER

## 2023-04-24 PROCEDURE — 3078F DIAST BP <80 MM HG: CPT | Performed by: NURSE PRACTITIONER

## 2023-04-24 PROCEDURE — G8427 DOCREV CUR MEDS BY ELIG CLIN: HCPCS | Performed by: NURSE PRACTITIONER

## 2023-04-24 PROCEDURE — G8417 CALC BMI ABV UP PARAM F/U: HCPCS | Performed by: NURSE PRACTITIONER

## 2023-04-24 PROCEDURE — 3074F SYST BP LT 130 MM HG: CPT | Performed by: NURSE PRACTITIONER

## 2023-04-24 PROCEDURE — 99214 OFFICE O/P EST MOD 30 MIN: CPT | Performed by: NURSE PRACTITIONER

## 2023-04-24 PROCEDURE — 1036F TOBACCO NON-USER: CPT | Performed by: NURSE PRACTITIONER

## 2023-04-24 PROCEDURE — 3017F COLORECTAL CA SCREEN DOC REV: CPT | Performed by: NURSE PRACTITIONER

## 2023-04-24 PROCEDURE — 1123F ACP DISCUSS/DSCN MKR DOCD: CPT | Performed by: NURSE PRACTITIONER

## 2023-04-24 SDOH — ECONOMIC STABILITY: FOOD INSECURITY: WITHIN THE PAST 12 MONTHS, THE FOOD YOU BOUGHT JUST DIDN'T LAST AND YOU DIDN'T HAVE MONEY TO GET MORE.: PATIENT DECLINED

## 2023-04-24 SDOH — ECONOMIC STABILITY: INCOME INSECURITY: HOW HARD IS IT FOR YOU TO PAY FOR THE VERY BASICS LIKE FOOD, HOUSING, MEDICAL CARE, AND HEATING?: SOMEWHAT HARD

## 2023-04-24 SDOH — ECONOMIC STABILITY: HOUSING INSECURITY
IN THE LAST 12 MONTHS, WAS THERE A TIME WHEN YOU DID NOT HAVE A STEADY PLACE TO SLEEP OR SLEPT IN A SHELTER (INCLUDING NOW)?: PATIENT REFUSED

## 2023-04-24 SDOH — ECONOMIC STABILITY: FOOD INSECURITY: WITHIN THE PAST 12 MONTHS, YOU WORRIED THAT YOUR FOOD WOULD RUN OUT BEFORE YOU GOT MONEY TO BUY MORE.: PATIENT DECLINED

## 2023-04-24 ASSESSMENT — PATIENT HEALTH QUESTIONNAIRE - PHQ9
1. LITTLE INTEREST OR PLEASURE IN DOING THINGS: 0
SUM OF ALL RESPONSES TO PHQ QUESTIONS 1-9: 0
SUM OF ALL RESPONSES TO PHQ QUESTIONS 1-9: 0
2. FEELING DOWN, DEPRESSED OR HOPELESS: 0
SUM OF ALL RESPONSES TO PHQ QUESTIONS 1-9: 0
SUM OF ALL RESPONSES TO PHQ9 QUESTIONS 1 & 2: 0
SUM OF ALL RESPONSES TO PHQ QUESTIONS 1-9: 0

## 2023-04-25 ENCOUNTER — TELEPHONE (OUTPATIENT)
Dept: INTERNAL MEDICINE CLINIC | Facility: CLINIC | Age: 73
End: 2023-04-25

## 2023-05-15 ASSESSMENT — ENCOUNTER SYMPTOMS
CHEST TIGHTNESS: 0
ABDOMINAL PAIN: 0
SHORTNESS OF BREATH: 1
WHEEZING: 0

## 2023-05-24 ASSESSMENT — ENCOUNTER SYMPTOMS
SHORTNESS OF BREATH: 1
ABDOMINAL PAIN: 0
COUGH: 0
PHOTOPHOBIA: 0
SORE THROAT: 0
ABDOMINAL DISTENTION: 0
DIARRHEA: 0
CONSTIPATION: 0

## 2023-05-24 NOTE — PROGRESS NOTES
[facility] CARDIOLOGY  [address], [address]  PHONE: [phone]        NAME:  Gareth Giron  : 1950  MRN: [id number]     PCP:  MAR Foster NP      SUBJECTIVE:   Gareth Giron is a 68 y.o. male seen for a follow up visit regarding the following:     Chief Complaint   Patient presents with    Hypertension       HPI:  Doing well since last visit without interval angina, CHF, palpitations, edema, presyncope or syncope but continues to describe recurrent exertional dyspnea without any other associated symptoms, stable and unchanged since last visit. Staying active without any significant limitations other than dyspnea, using home O2 at night but wonders if he needs it during the day, will discuss with [facility] in August. Has CAROLINA but cannot tolerate CPAP. Not wearing at present. Clinically euvolemic today. He denies any chest discomfort symptoms whatsoever with his exertional  dyspnea. Nuclear stress test last visit showed no reversible ischemia. He has increased RV uptake on the nuclear stress test but he also has known left main disease by catheterization in 2018 with 3 out of 3 patent bypass grafts. Given the absence of any regional ischemia on the nuclear stress test, it is doubtful that he has any new severe stenosis of one of his bypass grafts and I think his breathing, especially given the fact that he is wheezing with the dyspnea, is mostly due to pulmonary disease. He is encouraged to use his Symbicort regularly and doing better from a wheezing standpoint. He is counseled regarding the importance of twice daily Symbicort use which will probably help his baseline dyspnea. Echo 2018 showed normal EF and mild diastolic dysfunction with no significant valvular regurgitation and normal PA pressures by estimation. Cath  showed:   1. Three out of 3 patent bypass grafts.    2.  Normal left ventricular

## 2023-05-25 ENCOUNTER — OFFICE VISIT (OUTPATIENT)
Age: 73
End: 2023-05-25
Payer: MEDICARE

## 2023-05-25 VITALS
HEART RATE: 72 BPM | BODY MASS INDEX: 30.86 KG/M2 | SYSTOLIC BLOOD PRESSURE: 124 MMHG | DIASTOLIC BLOOD PRESSURE: 60 MMHG | WEIGHT: 192 LBS | HEIGHT: 66 IN

## 2023-05-25 DIAGNOSIS — I51.7 LVH (LEFT VENTRICULAR HYPERTROPHY): ICD-10-CM

## 2023-05-25 DIAGNOSIS — I25.10 CORONARY ARTERY DISEASE INVOLVING NATIVE CORONARY ARTERY OF NATIVE HEART WITHOUT ANGINA PECTORIS: Primary | ICD-10-CM

## 2023-05-25 DIAGNOSIS — G47.33 OSA (OBSTRUCTIVE SLEEP APNEA): Chronic | ICD-10-CM

## 2023-05-25 DIAGNOSIS — I34.0 NONRHEUMATIC MITRAL VALVE REGURGITATION: ICD-10-CM

## 2023-05-25 DIAGNOSIS — I10 ESSENTIAL HYPERTENSION: Chronic | ICD-10-CM

## 2023-05-25 DIAGNOSIS — Z95.1 S/P CABG (CORONARY ARTERY BYPASS GRAFT): ICD-10-CM

## 2023-05-25 PROCEDURE — 99214 OFFICE O/P EST MOD 30 MIN: CPT | Performed by: INTERNAL MEDICINE

## 2023-05-25 PROCEDURE — G8417 CALC BMI ABV UP PARAM F/U: HCPCS | Performed by: INTERNAL MEDICINE

## 2023-05-25 PROCEDURE — G8427 DOCREV CUR MEDS BY ELIG CLIN: HCPCS | Performed by: INTERNAL MEDICINE

## 2023-05-25 PROCEDURE — 3074F SYST BP LT 130 MM HG: CPT | Performed by: INTERNAL MEDICINE

## 2023-05-25 PROCEDURE — 3017F COLORECTAL CA SCREEN DOC REV: CPT | Performed by: INTERNAL MEDICINE

## 2023-05-25 PROCEDURE — 3078F DIAST BP <80 MM HG: CPT | Performed by: INTERNAL MEDICINE

## 2023-05-25 PROCEDURE — 1123F ACP DISCUSS/DSCN MKR DOCD: CPT | Performed by: INTERNAL MEDICINE

## 2023-05-25 PROCEDURE — 1036F TOBACCO NON-USER: CPT | Performed by: INTERNAL MEDICINE

## 2023-05-30 ENCOUNTER — TELEPHONE (OUTPATIENT)
Dept: SLEEP MEDICINE | Age: 73
End: 2023-05-30

## 2023-05-30 NOTE — TELEPHONE ENCOUNTER
Left msg asking pt if he has been evaluated for the [facility] JACKELINE device.  Pt is scheduled for an sleep clinic appt on 6/1/23 with Dr Leyva Prom

## 2023-06-01 NOTE — PROGRESS NOTES
Dr. Yessy Daniels MD  [address] [facility]. [address], [address] [facility]  [phone]    Patient Name:  Ceasar Leggett  YOB: 1950  Office Visit: 6/2/2023    ASSESSMENT AND PLAN:  (Medical Decision Making)  Ceasar Leggett is a 68 y.o. male with GOLD stage 2 COPD, PI*MF alpha-1 antitrypsin deficiency on augmentation therapy, recurrent bronchitis. 1. Stage 2 moderate COPD by GOLD classification ([address]  Slightly worsened airflow obstruction. We will step up to triple therapy with Valdene Malik which was sent into the [place] pharmacy. Continue as needed albuterol. Additionally given that recurrent episodes of COPD exacerbations, plan to start Monday Wednesday Friday azithromycin  - Budeson-Glycopyrrol-Formoterol (BREZTRI AEROSPHERE) 160-9-4.8 MCG/ACT AERO; Inhale 2 puffs into the lungs in the morning and at bedtime  Dispense: 3 each; Refill: 3  - azithromycin (ZITHROMAX) 250 MG tablet; Take 1 tablet by mouth three times a week  Dispense: 45 tablet; Refill: 3    2. Bronchitis, mucopurulent recurrent ([address]  Patient reports several episodes of bronchitis requiring steroids and/or antibiotics. In order to prevent future exacerbations, low-dose azithromycin was started. 3. Alpha-1-antitrypsin deficiency ([address]  Continue Prolastin for PI MF alpha 1 antitrypsin deficiency    Orders Placed This Encounter   Medications    Budeson-Glycopyrrol-Formoterol (BREZTRI AEROSPHERE) 160-9-4.8 MCG/ACT AERO     Sig: Inhale 2 puffs into the lungs in the morning and at bedtime     Dispense:  3 each     Refill:  3     Please fill upon patient request.    azithromycin (ZITHROMAX) 250 MG tablet     Sig: Take 1 tablet by mouth three times a week     Dispense:  45 tablet     Refill:  3     No orders of the defined types were placed in this encounter. Follow-up and Dispositions    Return in about 6 months (around 12/2/2023) for follow up in 6 months with Uriel Hernandez, needs appt at sleep center to restart on cpap turned it in .

## 2023-06-02 ENCOUNTER — OFFICE VISIT (OUTPATIENT)
Dept: PULMONOLOGY | Age: 73
End: 2023-06-02

## 2023-06-02 VITALS
BODY MASS INDEX: 30.7 KG/M2 | DIASTOLIC BLOOD PRESSURE: 64 MMHG | RESPIRATION RATE: 15 BRPM | WEIGHT: 191 LBS | TEMPERATURE: 97 F | HEART RATE: 82 BPM | OXYGEN SATURATION: 96 % | SYSTOLIC BLOOD PRESSURE: 134 MMHG | HEIGHT: 66 IN

## 2023-06-02 DIAGNOSIS — J41.1 BRONCHITIS, MUCOPURULENT RECURRENT (HCC): ICD-10-CM

## 2023-06-02 DIAGNOSIS — E88.01 ALPHA-1-ANTITRYPSIN DEFICIENCY (HCC): ICD-10-CM

## 2023-06-02 DIAGNOSIS — J44.9 STAGE 2 MODERATE COPD BY GOLD CLASSIFICATION (HCC): Primary | ICD-10-CM

## 2023-06-02 RX ORDER — AZITHROMYCIN 250 MG/1
250 TABLET, FILM COATED ORAL
Qty: 45 TABLET | Refills: 3 | Status: SHIPPED | OUTPATIENT
Start: 2023-06-02

## 2023-06-02 RX ORDER — BUDESONIDE, GLYCOPYRROLATE, AND FORMOTEROL FUMARATE 160; 9; 4.8 UG/1; UG/1; UG/1
2 AEROSOL, METERED RESPIRATORY (INHALATION) 2 TIMES DAILY
Qty: 3 EACH | Refills: 3 | Status: SHIPPED | OUTPATIENT
Start: 2023-06-02

## 2023-07-10 ENCOUNTER — OFFICE VISIT (OUTPATIENT)
Dept: SLEEP MEDICINE | Age: 73
End: 2023-07-10
Payer: MEDICARE

## 2023-07-10 VITALS
HEART RATE: 81 BPM | TEMPERATURE: 97.4 F | HEIGHT: 66 IN | RESPIRATION RATE: 14 BRPM | OXYGEN SATURATION: 96 % | SYSTOLIC BLOOD PRESSURE: 120 MMHG | DIASTOLIC BLOOD PRESSURE: 70 MMHG | WEIGHT: 189.4 LBS | BODY MASS INDEX: 30.44 KG/M2

## 2023-07-10 DIAGNOSIS — G47.33 OSA (OBSTRUCTIVE SLEEP APNEA): Primary | ICD-10-CM

## 2023-07-10 DIAGNOSIS — G47.10 HYPERSOMNIA, UNSPECIFIED: ICD-10-CM

## 2023-07-10 DIAGNOSIS — G47.34 NOCTURNAL HYPOXEMIA: ICD-10-CM

## 2023-07-10 PROCEDURE — 3074F SYST BP LT 130 MM HG: CPT | Performed by: INTERNAL MEDICINE

## 2023-07-10 PROCEDURE — G8417 CALC BMI ABV UP PARAM F/U: HCPCS | Performed by: INTERNAL MEDICINE

## 2023-07-10 PROCEDURE — 1036F TOBACCO NON-USER: CPT | Performed by: INTERNAL MEDICINE

## 2023-07-10 PROCEDURE — 99215 OFFICE O/P EST HI 40 MIN: CPT | Performed by: INTERNAL MEDICINE

## 2023-07-10 PROCEDURE — 3017F COLORECTAL CA SCREEN DOC REV: CPT | Performed by: INTERNAL MEDICINE

## 2023-07-10 PROCEDURE — 3078F DIAST BP <80 MM HG: CPT | Performed by: INTERNAL MEDICINE

## 2023-07-10 PROCEDURE — G8427 DOCREV CUR MEDS BY ELIG CLIN: HCPCS | Performed by: INTERNAL MEDICINE

## 2023-07-10 PROCEDURE — 1123F ACP DISCUSS/DSCN MKR DOCD: CPT | Performed by: INTERNAL MEDICINE

## 2023-07-10 ASSESSMENT — SLEEP AND FATIGUE QUESTIONNAIRES
HOW LIKELY ARE YOU TO NOD OFF OR FALL ASLEEP WHILE LYING DOWN TO REST IN THE AFTERNOON WHEN CIRCUMSTANCES PERMIT: 3
HOW LIKELY ARE YOU TO NOD OFF OR FALL ASLEEP WHILE WATCHING TV: 3
HOW LIKELY ARE YOU TO NOD OFF OR FALL ASLEEP IN A CAR, WHILE STOPPED FOR A FEW MINUTES IN TRAFFIC: 1
HOW LIKELY ARE YOU TO NOD OFF OR FALL ASLEEP WHILE SITTING INACTIVE IN A PUBLIC PLACE: 0
HOW LIKELY ARE YOU TO NOD OFF OR FALL ASLEEP WHILE SITTING AND TALKING TO SOMEONE: 0
HOW LIKELY ARE YOU TO NOD OFF OR FALL ASLEEP WHEN YOU ARE A PASSENGER IN A CAR FOR AN HOUR WITHOUT A BREAK: 0
ESS TOTAL SCORE: 13
HOW LIKELY ARE YOU TO NOD OFF OR FALL ASLEEP WHILE SITTING QUIETLY AFTER LUNCH WITHOUT ALCOHOL: 3
HOW LIKELY ARE YOU TO NOD OFF OR FALL ASLEEP WHILE SITTING AND READING: 3

## 2023-07-10 NOTE — PATIENT INSTRUCTIONS
The company who will be taking care of your CPAP supplies is:     Address: [address] [place] [address] [address], [address]  Phone: [phone] Option #1  Fax: [phone]

## 2023-07-11 DIAGNOSIS — G47.33 OSA (OBSTRUCTIVE SLEEP APNEA): Primary | ICD-10-CM

## 2023-07-24 ENCOUNTER — TELEPHONE (OUTPATIENT)
Dept: SLEEP MEDICINE | Age: 73
End: 2023-07-24

## 2023-07-24 DIAGNOSIS — G47.33 OSA (OBSTRUCTIVE SLEEP APNEA): Primary | ICD-10-CM

## 2023-07-24 NOTE — TELEPHONE ENCOUNTER
Maggie Stover told patient that because it had been over a year since his sleep study that he would need to have another one to be able to get Medicare to pay for this

## 2023-08-03 ENCOUNTER — HOSPITAL ENCOUNTER (OUTPATIENT)
Dept: SLEEP CENTER | Age: 73
Discharge: HOME | End: 2023-08-06
Payer: MEDICARE

## 2023-08-03 PROCEDURE — 95806 SLEEP STUDY UNATT&RESP EFFT: CPT

## 2023-08-14 ENCOUNTER — TELEPHONE (OUTPATIENT)
Dept: SLEEP MEDICINE | Age: 73
End: 2023-08-14

## 2023-08-14 NOTE — TELEPHONE ENCOUNTER
Patient is aware his study has not been read yet, but once it is it will be sent with cpap orders to [address] so he can get new cpap machine.

## 2023-08-16 ENCOUNTER — TELEPHONE (OUTPATIENT)
Dept: SLEEP MEDICINE | Age: 73
End: 2023-08-16

## 2023-08-16 DIAGNOSIS — G47.33 OSA (OBSTRUCTIVE SLEEP APNEA): Primary | ICD-10-CM

## 2023-10-16 NOTE — PROGRESS NOTES
Watching TV 3 3 1   Sitting, inactive in a public place (e.g. a theatre or a meeting) 3 0 3   As a passenger in a car for an hour without a break 2 0 0   Lying down to rest in the afternoon when circumstances permit 2 3 2   Sitting and talking to someone 0 0 0   Sitting quietly after a lunch without alcohol 1 3 0   In a car, while stopped for a few minutes in traffic 1 1 0   [place] Sleepiness Score 15 13 7               [place] Sleepiness Scale:      10/17/2023     3:07 PM 7/10/2023     1:51 PM 2/13/2023    11:30 AM   Sleep Medicine   Sitting and reading 3 3 1   Watching TV 3 3 1   Sitting, inactive in a public place (e.g. a theatre or a meeting) 3 0 3   As a passenger in a car for an hour without a break 2 0 0   Lying down to rest in the afternoon when circumstances permit 2 3 2   Sitting and talking to someone 0 0 0   Sitting quietly after a lunch without alcohol 1 3 0   In a car, while stopped for a few minutes in traffic 1 1 0   [place] Sleepiness Score 15 13 7         DIAGNOSTIC TESTS:  HST 8/6/23          Sleep Study- HST 3/24/22  AHI 18.1  Sao2 75%           No data to display                     Past Medical History:   Diagnosis Date    Abnormal cardiovascular function study 1/26/2016    Adenoma of left adrenal gland     Arthritis     general    BPH (benign prostatic hyperplasia)     Chest discomfort 1/26/2016    Chronic kidney disease     Chronic low back pain     Chronic pain     back, hip    COPD (chronic obstructive pulmonary disease) (HCC)     mild per pt; no rescue inhaler; denies SOB with 1 flight of steps    Coronary artery disease     had CABG 9/14/15 s/p failed stress test    ED (erectile dysfunction)     GERD (gastroesophageal reflux disease)     controlled with med    History of basal cell cancer     Hypertension     controlled with med    Left atrial dilation 10/2016    LVH (left ventricular hypertrophy) 02/2018    Mitral valve regurgitation 10/2016    1+    Mixed hyperlipidemia     Murmur,

## 2023-10-17 ENCOUNTER — OFFICE VISIT (OUTPATIENT)
Dept: SLEEP MEDICINE | Age: 73
End: 2023-10-17
Payer: MEDICARE

## 2023-10-17 VITALS
OXYGEN SATURATION: 90 % | WEIGHT: 185 LBS | DIASTOLIC BLOOD PRESSURE: 64 MMHG | HEART RATE: 72 BPM | BODY MASS INDEX: 29.73 KG/M2 | HEIGHT: 66 IN | SYSTOLIC BLOOD PRESSURE: 132 MMHG | RESPIRATION RATE: 14 BRPM

## 2023-10-17 DIAGNOSIS — G47.34 NOCTURNAL HYPOXEMIA: ICD-10-CM

## 2023-10-17 DIAGNOSIS — Z78.9 DIFFICULTY USING CONTINUOUS POSITIVE AIRWAY PRESSURE (CPAP) NASAL MASK: ICD-10-CM

## 2023-10-17 DIAGNOSIS — G47.10 HYPERSOMNIA, UNSPECIFIED: ICD-10-CM

## 2023-10-17 DIAGNOSIS — G47.33 OSA (OBSTRUCTIVE SLEEP APNEA): Primary | ICD-10-CM

## 2023-10-17 PROCEDURE — G8484 FLU IMMUNIZE NO ADMIN: HCPCS | Performed by: INTERNAL MEDICINE

## 2023-10-17 PROCEDURE — 1123F ACP DISCUSS/DSCN MKR DOCD: CPT | Performed by: INTERNAL MEDICINE

## 2023-10-17 PROCEDURE — 1036F TOBACCO NON-USER: CPT | Performed by: INTERNAL MEDICINE

## 2023-10-17 PROCEDURE — 99215 OFFICE O/P EST HI 40 MIN: CPT | Performed by: INTERNAL MEDICINE

## 2023-10-17 PROCEDURE — 3078F DIAST BP <80 MM HG: CPT | Performed by: INTERNAL MEDICINE

## 2023-10-17 PROCEDURE — G8417 CALC BMI ABV UP PARAM F/U: HCPCS | Performed by: INTERNAL MEDICINE

## 2023-10-17 PROCEDURE — 3075F SYST BP GE 130 - 139MM HG: CPT | Performed by: INTERNAL MEDICINE

## 2023-10-17 PROCEDURE — G8427 DOCREV CUR MEDS BY ELIG CLIN: HCPCS | Performed by: INTERNAL MEDICINE

## 2023-10-17 PROCEDURE — 3017F COLORECTAL CA SCREEN DOC REV: CPT | Performed by: INTERNAL MEDICINE

## 2023-10-17 ASSESSMENT — SLEEP AND FATIGUE QUESTIONNAIRES
HOW LIKELY ARE YOU TO NOD OFF OR FALL ASLEEP WHILE WATCHING TV: 3
ESS TOTAL SCORE: 15
HOW LIKELY ARE YOU TO NOD OFF OR FALL ASLEEP WHILE SITTING AND READING: 3
HOW LIKELY ARE YOU TO NOD OFF OR FALL ASLEEP WHILE LYING DOWN TO REST IN THE AFTERNOON WHEN CIRCUMSTANCES PERMIT: 2
HOW LIKELY ARE YOU TO NOD OFF OR FALL ASLEEP WHILE SITTING QUIETLY AFTER LUNCH WITHOUT ALCOHOL: 1
HOW LIKELY ARE YOU TO NOD OFF OR FALL ASLEEP WHILE SITTING INACTIVE IN A PUBLIC PLACE: 3
HOW LIKELY ARE YOU TO NOD OFF OR FALL ASLEEP WHEN YOU ARE A PASSENGER IN A CAR FOR AN HOUR WITHOUT A BREAK: 2
HOW LIKELY ARE YOU TO NOD OFF OR FALL ASLEEP IN A CAR, WHILE STOPPED FOR A FEW MINUTES IN TRAFFIC: 1
HOW LIKELY ARE YOU TO NOD OFF OR FALL ASLEEP WHILE SITTING AND TALKING TO SOMEONE: 0

## 2023-10-18 LAB — IMP & REVIEW OF LAB RESULTS: NORMAL

## 2023-10-24 ENCOUNTER — OFFICE VISIT (OUTPATIENT)
Dept: INTERNAL MEDICINE CLINIC | Facility: CLINIC | Age: 73
End: 2023-10-24
Payer: MEDICARE

## 2023-10-24 VITALS
HEIGHT: 66 IN | DIASTOLIC BLOOD PRESSURE: 78 MMHG | SYSTOLIC BLOOD PRESSURE: 128 MMHG | BODY MASS INDEX: 29.25 KG/M2 | HEART RATE: 72 BPM | OXYGEN SATURATION: 100 % | WEIGHT: 182 LBS

## 2023-10-24 DIAGNOSIS — E78.2 MIXED HYPERLIPIDEMIA: Primary | Chronic | ICD-10-CM

## 2023-10-24 DIAGNOSIS — I10 ESSENTIAL HYPERTENSION: Chronic | ICD-10-CM

## 2023-10-24 DIAGNOSIS — E88.01 ALPHA-1-ANTITRYPSIN DEFICIENCY (HCC): Chronic | ICD-10-CM

## 2023-10-24 DIAGNOSIS — N40.1 BENIGN PROSTATIC HYPERPLASIA WITH NOCTURIA: ICD-10-CM

## 2023-10-24 DIAGNOSIS — Z95.1 S/P CABG (CORONARY ARTERY BYPASS GRAFT): ICD-10-CM

## 2023-10-24 DIAGNOSIS — R35.1 BENIGN PROSTATIC HYPERPLASIA WITH NOCTURIA: ICD-10-CM

## 2023-10-24 DIAGNOSIS — I25.10 CORONARY ARTERY DISEASE INVOLVING NATIVE CORONARY ARTERY OF NATIVE HEART WITHOUT ANGINA PECTORIS: ICD-10-CM

## 2023-10-24 DIAGNOSIS — Z86.010 HISTORY OF ADENOMATOUS POLYP OF COLON: ICD-10-CM

## 2023-10-24 PROCEDURE — G8427 DOCREV CUR MEDS BY ELIG CLIN: HCPCS | Performed by: NURSE PRACTITIONER

## 2023-10-24 PROCEDURE — G8417 CALC BMI ABV UP PARAM F/U: HCPCS | Performed by: NURSE PRACTITIONER

## 2023-10-24 PROCEDURE — 3078F DIAST BP <80 MM HG: CPT | Performed by: NURSE PRACTITIONER

## 2023-10-24 PROCEDURE — G8484 FLU IMMUNIZE NO ADMIN: HCPCS | Performed by: NURSE PRACTITIONER

## 2023-10-24 PROCEDURE — 1123F ACP DISCUSS/DSCN MKR DOCD: CPT | Performed by: NURSE PRACTITIONER

## 2023-10-24 PROCEDURE — 99214 OFFICE O/P EST MOD 30 MIN: CPT | Performed by: NURSE PRACTITIONER

## 2023-10-24 PROCEDURE — 3074F SYST BP LT 130 MM HG: CPT | Performed by: NURSE PRACTITIONER

## 2023-10-24 PROCEDURE — 1036F TOBACCO NON-USER: CPT | Performed by: NURSE PRACTITIONER

## 2023-10-24 PROCEDURE — 3017F COLORECTAL CA SCREEN DOC REV: CPT | Performed by: NURSE PRACTITIONER

## 2023-10-24 ASSESSMENT — ENCOUNTER SYMPTOMS
WHEEZING: 0
SHORTNESS OF BREATH: 1
ABDOMINAL PAIN: 0
CHEST TIGHTNESS: 0

## 2023-10-24 NOTE — PROGRESS NOTES
PROGRESS NOTE    SUBJECTIVE:   Tempie Angelucci is a 68 y.o. male seen for a follow up visit for   Chief Complaint   Patient presents with    Follow-up     Hypertension   This is a chronic problem. Episode onset: 2005. Asymptomatic. Risk factors include dyslipidemia, smoking/tobacco exposure and male gender. Cholesterol Problem  This is a chronic problem. Episode onset: 2005. Asymptomatic. Treatments tried: statin. GERD  This is a chronic problem. The current episode started years ago. Occurrences vary depending on types of foods. Current treatment:  omeprazole 40 mg before breakfast.     COPD / alpha-1 anti-tryptin. This is a chronic problem. Episode onset: 2015. Associated symptoms include shortness of breath ( Mild CERDA, no worse. ). uses nocturnal oxygen and with exertion. Arthritis  Polyarthralgia. This is a chronic problem. Episode onset: years ago. Tubular adenoma colon polyp   Last colonoscopy by Dr. Melanie Ashton 8/18/2011.  8/18/2011. Hepatic flexure polyp = Fragment of tubular adenoma. Reviewed and updated this visit by provider:  Tobacco  Allergies  Meds  Problems  Med Hx  Surg Hx  Fam Hx         Review of Systems   Constitutional:  Negative for chills, fatigue and fever. Respiratory:  Positive for shortness of breath (with exertion). Negative for chest tightness and wheezing. Cardiovascular:  Negative for chest pain, palpitations and leg swelling. Gastrointestinal:  Negative for abdominal pain. Endocrine: Negative for polydipsia, polyphagia and polyuria. Genitourinary:  Negative for frequency. Nocturia 3 to 4 times per night   Musculoskeletal:  Negative for gait problem. Skin:  Negative for rash. Neurological:  Negative for weakness, numbness and headaches. Psychiatric/Behavioral:  Negative for dysphoric mood. The patient is not nervous/anxious.          OBJECTIVE:    /78   Pulse 72   Ht 1.676 m (5' 6\")   Wt 82.6 kg (182 lb)   SpO2 100%   BMI

## 2023-10-24 NOTE — PATIENT INSTRUCTIONS
Recent Results (from the past 672 hour(s))   CBC with Auto Differential    Collection Time: 10/17/23 10:13 AM   Result Value Ref Range    WBC 5.2 3.4 - 10.8 x10E3/uL    RBC 4.73 4.14 - 5.80 x10E6/uL    Hemoglobin 15.8 13.0 - 17.7 g/dL    Hematocrit 45.8 37.5 - 51.0 %    MCV 97 79 - 97 fL    MCH 33.4 (H) 26.6 - 33.0 pg    MCHC 34.5 31.5 - 35.7 g/dL    RDW 11.8 11.6 - 15.4 %    Platelets 319 761 - 756 x10E3/uL    Neutrophils % 52 Not Estab. %    Lymphocytes % 30 Not Estab. %    Monocytes % 12 Not Estab. %    Eosinophils % 5 Not Estab. %    Basophils % 1 Not Estab. %    Neutrophils Absolute 2.7 1.4 - 7.0 x10E3/uL    Lymphocytes Absolute 1.6 0.7 - 3.1 x10E3/uL    Monocytes Absolute 0.6 0.1 - 0.9 x10E3/uL    Eosinophils Absolute 0.3 0.0 - 0.4 x10E3/uL    Basophils Absolute 0.1 0.0 - 0.2 x10E3/uL    Immature Granulocytes 0 Not Estab. %    Immature Grans (Abs) 0.0 0.0 - 0.1 x10E3/uL   Comprehensive Metabolic Panel    Collection Time: 10/17/23 10:13 AM   Result Value Ref Range    Glucose 104 (H) 70 - 99 mg/dL    BUN 13 8 - 27 mg/dL    Creatinine 0.96 0.76 - 1.27 mg/dL    Est, Glomerular Filtration Rate 84 >59 mL/min/1.73    BUN/Creatinine Ratio 14 10 - 24    Sodium 140 134 - 144 mmol/L    Potassium 4.5 3.5 - 5.2 mmol/L    Chloride 104 96 - 106 mmol/L    CO2 22 20 - 29 mmol/L    Calcium 9.9 8.6 - 10.2 mg/dL    Total Protein 6.9 6.0 - 8.5 g/dL    Albumin 4.4 3.8 - 4.8 g/dL    Globulin, Total 2.5 1.5 - 4.5 g/dL    Albumin/Globulin Ratio 1.8 1.2 - 2.2    Total Bilirubin 0.5 0.0 - 1.2 mg/dL    Alkaline Phosphatase 123 (H) 44 - 121 IU/L    AST 25 0 - 40 IU/L    ALT 26 0 - 44 IU/L   Hemoglobin A1C    Collection Time: 10/17/23 10:13 AM   Result Value Ref Range    Hemoglobin A1C 5.9 (H) 4.8 - 5.6 %   Lipid Panel    Collection Time: 10/17/23 10:13 AM   Result Value Ref Range    Cholesterol 116 100 - 199 mg/dL    Triglycerides 87 0 - 149 mg/dL    HDL 49 >39 mg/dL    VLDL Cholesterol Calculated 17 5 - 40 mg/dL    LDL Calculated 50

## 2023-12-03 ASSESSMENT — ENCOUNTER SYMPTOMS
SHORTNESS OF BREATH: 1
PHOTOPHOBIA: 0
CONSTIPATION: 0
SORE THROAT: 0
ABDOMINAL PAIN: 0
ABDOMINAL DISTENTION: 0
COUGH: 0
DIARRHEA: 0

## 2023-12-03 NOTE — PROGRESS NOTES
hallucinations. The patient is not nervous/anxious. PHYSICAL EXAM:     Vitals:    12/05/23 0925   BP: 138/80   Pulse: 70   Weight: 83.5 kg (184 lb)   Height: 1.676 m (5' 6\")        Wt Readings from Last 3 Encounters:   12/05/23 83.5 kg (184 lb)   11/20/23 82.6 kg (182 lb)   10/24/23 82.6 kg (182 lb)      BP Readings from Last 3 Encounters:   12/05/23 138/80   11/20/23 124/70   10/24/23 128/78        Physical Exam  Constitutional:       Appearance: Normal appearance. He is normal weight. HENT:      Head: Normocephalic and atraumatic. Nose: Nose normal.      Mouth/Throat:      Mouth: Mucous membranes are moist.      Pharynx: Oropharynx is clear. Eyes:      Extraocular Movements: Extraocular movements intact. Pupils: Pupils are equal, round, and reactive to light. Neck:      Vascular: No carotid bruit or JVD. Cardiovascular:      Rate and Rhythm: Normal rate and regular rhythm. Heart sounds: No murmur heard. No friction rub. No gallop. Pulmonary:      Effort: Pulmonary effort is normal.      Breath sounds: Normal breath sounds. No wheezing or rhonchi. Abdominal:      General: Abdomen is flat. Bowel sounds are normal. There is no distension. Palpations: Abdomen is soft. Tenderness: There is no abdominal tenderness. Musculoskeletal:         General: No swelling. Normal range of motion. Cervical back: Normal range of motion and neck supple. No tenderness. Skin:     General: Skin is warm and dry. Neurological:      General: No focal deficit present. Mental Status: He is alert and oriented to person, place, and time. Mental status is at baseline. Psychiatric:         Mood and Affect: Mood normal.         Behavior: Behavior normal.          Medical problems and test results were reviewed with the patient today.        Lab Results   Component Value Date    CHOL 116 10/17/2023    CHOL 182 04/17/2023    CHOL 161 09/19/2022     Lab Results   Component Value Date

## 2023-12-05 ENCOUNTER — OFFICE VISIT (OUTPATIENT)
Age: 73
End: 2023-12-05
Payer: MEDICARE

## 2023-12-05 VITALS
HEIGHT: 66 IN | SYSTOLIC BLOOD PRESSURE: 138 MMHG | WEIGHT: 184 LBS | HEART RATE: 70 BPM | BODY MASS INDEX: 29.57 KG/M2 | DIASTOLIC BLOOD PRESSURE: 80 MMHG

## 2023-12-05 DIAGNOSIS — I25.10 CORONARY ARTERY DISEASE INVOLVING NATIVE CORONARY ARTERY OF NATIVE HEART WITHOUT ANGINA PECTORIS: Primary | ICD-10-CM

## 2023-12-05 DIAGNOSIS — G47.33 OSA (OBSTRUCTIVE SLEEP APNEA): Chronic | ICD-10-CM

## 2023-12-05 DIAGNOSIS — I34.0 NONRHEUMATIC MITRAL VALVE REGURGITATION: ICD-10-CM

## 2023-12-05 DIAGNOSIS — I10 ESSENTIAL HYPERTENSION: Chronic | ICD-10-CM

## 2023-12-05 DIAGNOSIS — E78.2 MIXED HYPERLIPIDEMIA: Chronic | ICD-10-CM

## 2023-12-05 DIAGNOSIS — R00.2 INTERMITTENT PALPITATIONS: ICD-10-CM

## 2023-12-05 DIAGNOSIS — Z95.1 S/P CABG (CORONARY ARTERY BYPASS GRAFT): ICD-10-CM

## 2023-12-05 PROCEDURE — 1036F TOBACCO NON-USER: CPT | Performed by: INTERNAL MEDICINE

## 2023-12-05 PROCEDURE — 99214 OFFICE O/P EST MOD 30 MIN: CPT | Performed by: INTERNAL MEDICINE

## 2023-12-05 PROCEDURE — 93000 ELECTROCARDIOGRAM COMPLETE: CPT | Performed by: INTERNAL MEDICINE

## 2023-12-05 PROCEDURE — G8484 FLU IMMUNIZE NO ADMIN: HCPCS | Performed by: INTERNAL MEDICINE

## 2023-12-05 PROCEDURE — 1123F ACP DISCUSS/DSCN MKR DOCD: CPT | Performed by: INTERNAL MEDICINE

## 2023-12-05 PROCEDURE — G8427 DOCREV CUR MEDS BY ELIG CLIN: HCPCS | Performed by: INTERNAL MEDICINE

## 2023-12-05 PROCEDURE — 3075F SYST BP GE 130 - 139MM HG: CPT | Performed by: INTERNAL MEDICINE

## 2023-12-05 PROCEDURE — 3079F DIAST BP 80-89 MM HG: CPT | Performed by: INTERNAL MEDICINE

## 2023-12-05 PROCEDURE — 3017F COLORECTAL CA SCREEN DOC REV: CPT | Performed by: INTERNAL MEDICINE

## 2023-12-05 PROCEDURE — G8417 CALC BMI ABV UP PARAM F/U: HCPCS | Performed by: INTERNAL MEDICINE

## 2024-01-25 NOTE — PROGRESS NOTES
Emphysematous change.  AIRWAYS: Trachea and proximal bronchi grossly patent.  PLEURA: No effusion or thickening or calcifications.  LYMPH NODES: No enlarged axillary, hilar or mediastinal lymph nodes.  HEART: Normal size.  AORTA: Normal caliber.  CORONARIES: Prior CABG surgery.   UPPER ABDOMEN: Stable left adrenal nodule..  SKELETAL/CHEST WALL: DJD, otherwise no gross bony lesions.     IMPRESSION:  Recommend noncontrast LDCT chest CT in 12 months.  L1 Negative: No nodules .    Specimen Collected: 02/20/23 18:59 EST Last Resulted: 02/20/23 20:19 EST       CT chest 2019  IMPRESSION:        1.  Interval progression of mild bilateral upper lobe predominant panlobular   emphysema and lower lobe predominant bronchiectasis.   2.  Other chronic findings as above.       Past Medical History:   Diagnosis Date    Abnormal cardiovascular function study 1/26/2016    Adenoma of left adrenal gland     Arthritis     general    BPH (benign prostatic hyperplasia)     Chest discomfort 1/26/2016    Chronic kidney disease     Chronic low back pain     Chronic pain     back, hip    COPD (chronic obstructive pulmonary disease) (HCC)     mild per pt; no rescue inhaler; denies SOB with 1 flight of steps    Coronary artery disease     had CABG 9/14/15 s/p failed stress test    ED (erectile dysfunction)     GERD (gastroesophageal reflux disease)     controlled with med    History of basal cell cancer     Hypertension     controlled with med    Left atrial dilation 10/2016    LVH (left ventricular hypertrophy) 02/2018    Mitral valve regurgitation 10/2016    1+    Mixed hyperlipidemia     Murmur, cardiac     soft 1/6 MR murmur per cardiologist note 11-10-17 : per echo 10-24-16 \"mild mitral regurg\"    Nausea & vomiting     responds to IV antiemetic    Palpitations     Pleural effusion exudative 9/30/15    t-cent on left    Prostatic hypertrophy, benign 1/26/2016    Sleep apnea     pt reports mild- no c-pap    Status post right hip replacement

## 2024-01-26 ENCOUNTER — OFFICE VISIT (OUTPATIENT)
Dept: PULMONOLOGY | Age: 74
End: 2024-01-26

## 2024-01-26 VITALS
RESPIRATION RATE: 16 BRPM | HEIGHT: 66 IN | OXYGEN SATURATION: 93 % | TEMPERATURE: 98.2 F | WEIGHT: 186 LBS | BODY MASS INDEX: 29.89 KG/M2 | SYSTOLIC BLOOD PRESSURE: 130 MMHG | HEART RATE: 84 BPM | DIASTOLIC BLOOD PRESSURE: 68 MMHG

## 2024-01-26 DIAGNOSIS — J47.9 BRONCHIECTASIS WITHOUT COMPLICATION (HCC): ICD-10-CM

## 2024-01-26 DIAGNOSIS — Z87.891 PERSONAL HISTORY OF TOBACCO USE, PRESENTING HAZARDS TO HEALTH: ICD-10-CM

## 2024-01-26 DIAGNOSIS — G47.33 OSA ON CPAP: ICD-10-CM

## 2024-01-26 DIAGNOSIS — E88.01 ALPHA-1-ANTITRYPSIN DEFICIENCY (HCC): ICD-10-CM

## 2024-01-26 DIAGNOSIS — R06.09 DYSPNEA ON EXERTION: ICD-10-CM

## 2024-01-26 DIAGNOSIS — J44.9 STAGE 2 MODERATE COPD BY GOLD CLASSIFICATION (HCC): Primary | ICD-10-CM

## 2024-01-26 LAB
EXPIRATORY TIME: NORMAL
FEF 25-75% %PRED-PRE: NORMAL
FEF 25-75% PRED: NORMAL
FEF 25-75-PRE: NORMAL
FEV1 %PRED-PRE: NORMAL %
FEV1 PRED: NORMAL
FEV1/FVC %PRED-PRE: NORMAL
FEV1/FVC PRED: NORMAL
FEV1/FVC: 0.66 %
FEV1: 1.65 L
FVC %PRED-PRE: NORMAL %
FVC PRED: NORMAL
FVC: 2.51 L
PEF %PRED-PRE: NORMAL
PEF PRED: NORMAL
PEF-PRE: NORMAL

## 2024-01-26 RX ORDER — AZITHROMYCIN 250 MG/1
250 TABLET, FILM COATED ORAL
Qty: 45 TABLET | Refills: 3 | Status: SHIPPED | OUTPATIENT
Start: 2024-01-26

## 2024-01-26 RX ORDER — BUDESONIDE, GLYCOPYRROLATE, AND FORMOTEROL FUMARATE 160; 9; 4.8 UG/1; UG/1; UG/1
2 AEROSOL, METERED RESPIRATORY (INHALATION) 2 TIMES DAILY
Qty: 3 EACH | Refills: 3 | Status: SHIPPED | OUTPATIENT
Start: 2024-01-26

## 2024-01-26 RX ORDER — ALBUTEROL SULFATE 90 UG/1
2 AEROSOL, METERED RESPIRATORY (INHALATION) EVERY 4 HOURS PRN
Qty: 3 EACH | Refills: 3 | Status: SHIPPED | OUTPATIENT
Start: 2024-01-26

## 2024-01-26 ASSESSMENT — PULMONARY FUNCTION TESTS
FEV1: 1.65
FEV1/FVC: 0.66
FVC: 2.51

## 2024-01-28 RX ORDER — ALBUTEROL SULFATE 2.5 MG/3ML
2.5 SOLUTION RESPIRATORY (INHALATION) EVERY 6 HOURS PRN
Qty: 120 EACH | Refills: 3 | Status: SHIPPED | OUTPATIENT
Start: 2024-01-28

## 2024-01-29 NOTE — PROGRESS NOTES
[facility]  [address]  [place] [address]  [phone]    Patient Name:  Tramaine Harding  YOB: 1950      Office Visit 1/30/2024    Chief Complaint   Patient presents with    Follow-up    Sleep Apnea    CPAP/BiPAP       HISTORY OF PRESENT ILLNESS:    This patient came in today for a follow.    To recap,   Patient previously with moderate sleep apnea AHI of 18.1 lowest at 75%.  Had been on PAP therapy and oxygen but did not like it. Was contemplating Inspire. Freedom score was elevated at 15/24. New PSG also noted positive for CAROLINA with AHI of 8.1. did given him an Jos full face mask since had issues with his N20i mask.       At present:  At this time patient is still using auto CPAP 6-12 cm H2O with C-Flex of 3 and oxygen 2 L/min.  He indicates he thinks he is getting the hang of it but there is some days that he just does not like it and will take it off after the 4 hours.  Compliance data shows he has used it 30 out of 30 days 27 days more than 4 hours average sleep is 5 hours 19 minutes.  AHI is down to 1.1.  95th percentile pressure is 10.7 and 95th percentile air leak is 25.0.  He indicates he is using the N30 I mask again but still has some issues with dry mouth.  He did have the Jos fullface mask that I gave him last time but indicated that has not worn it as much and sometimes has to fiddle with the straps.  Please note he has both mask with him today.    Overall he feels he is sleeping a little bit better.  Does have some insomnia sometimes which keeps him up and that is one of the reasons why he takes the mask off.  His Freedom score today is actually improved to 9/24.    In the past he was contemplating pursuing the inspire device, but indicates at this time he does not feel like he wants to.              1/30/2024     3:06 PM 10/17/2023     3:07 PM 7/10/2023     1:51 PM 2/13/2023    11:30 AM   Sleep Medicine   Sitting and reading 2 3 3 1   Watching TV 2

## 2024-01-30 ENCOUNTER — OFFICE VISIT (OUTPATIENT)
Dept: SLEEP MEDICINE | Age: 74
End: 2024-01-30
Payer: MEDICARE

## 2024-01-30 ENCOUNTER — TELEPHONE (OUTPATIENT)
Dept: SLEEP MEDICINE | Age: 74
End: 2024-01-30

## 2024-01-30 VITALS
OXYGEN SATURATION: 92 % | HEIGHT: 66 IN | DIASTOLIC BLOOD PRESSURE: 64 MMHG | BODY MASS INDEX: 30.37 KG/M2 | HEART RATE: 61 BPM | SYSTOLIC BLOOD PRESSURE: 130 MMHG | WEIGHT: 189 LBS | TEMPERATURE: 98 F | RESPIRATION RATE: 14 BRPM

## 2024-01-30 DIAGNOSIS — Z78.9 DIFFICULTY USING CONTINUOUS POSITIVE AIRWAY PRESSURE (CPAP) NASAL MASK: ICD-10-CM

## 2024-01-30 DIAGNOSIS — G47.10 HYPERSOMNIA, UNSPECIFIED: ICD-10-CM

## 2024-01-30 DIAGNOSIS — G47.34 NOCTURNAL HYPOXEMIA: ICD-10-CM

## 2024-01-30 DIAGNOSIS — G47.33 OSA (OBSTRUCTIVE SLEEP APNEA): Primary | ICD-10-CM

## 2024-01-30 DIAGNOSIS — Z78.9 DIFFICULTY WITH CPAP USE: ICD-10-CM

## 2024-01-30 DIAGNOSIS — G47.00 INSOMNIA, UNSPECIFIED TYPE: Primary | ICD-10-CM

## 2024-01-30 PROCEDURE — 3075F SYST BP GE 130 - 139MM HG: CPT | Performed by: INTERNAL MEDICINE

## 2024-01-30 PROCEDURE — 1036F TOBACCO NON-USER: CPT | Performed by: INTERNAL MEDICINE

## 2024-01-30 PROCEDURE — 3078F DIAST BP <80 MM HG: CPT | Performed by: INTERNAL MEDICINE

## 2024-01-30 PROCEDURE — 1123F ACP DISCUSS/DSCN MKR DOCD: CPT | Performed by: INTERNAL MEDICINE

## 2024-01-30 PROCEDURE — 99214 OFFICE O/P EST MOD 30 MIN: CPT | Performed by: INTERNAL MEDICINE

## 2024-01-30 PROCEDURE — G8427 DOCREV CUR MEDS BY ELIG CLIN: HCPCS | Performed by: INTERNAL MEDICINE

## 2024-01-30 PROCEDURE — 3017F COLORECTAL CA SCREEN DOC REV: CPT | Performed by: INTERNAL MEDICINE

## 2024-01-30 PROCEDURE — G8484 FLU IMMUNIZE NO ADMIN: HCPCS | Performed by: INTERNAL MEDICINE

## 2024-01-30 PROCEDURE — G8417 CALC BMI ABV UP PARAM F/U: HCPCS | Performed by: INTERNAL MEDICINE

## 2024-01-30 ASSESSMENT — SLEEP AND FATIGUE QUESTIONNAIRES
HOW LIKELY ARE YOU TO NOD OFF OR FALL ASLEEP WHILE SITTING QUIETLY AFTER LUNCH WITHOUT ALCOHOL: 1
HOW LIKELY ARE YOU TO NOD OFF OR FALL ASLEEP IN A CAR, WHILE STOPPED FOR A FEW MINUTES IN TRAFFIC: 0
HOW LIKELY ARE YOU TO NOD OFF OR FALL ASLEEP WHILE SITTING AND TALKING TO SOMEONE: 0
HOW LIKELY ARE YOU TO NOD OFF OR FALL ASLEEP WHILE WATCHING TV: 2
HOW LIKELY ARE YOU TO NOD OFF OR FALL ASLEEP WHEN YOU ARE A PASSENGER IN A CAR FOR AN HOUR WITHOUT A BREAK: 1
HOW LIKELY ARE YOU TO NOD OFF OR FALL ASLEEP WHILE SITTING INACTIVE IN A PUBLIC PLACE: 0
HOW LIKELY ARE YOU TO NOD OFF OR FALL ASLEEP WHILE LYING DOWN TO REST IN THE AFTERNOON WHEN CIRCUMSTANCES PERMIT: 3
HOW LIKELY ARE YOU TO NOD OFF OR FALL ASLEEP WHILE SITTING AND READING: 2
ESS TOTAL SCORE: 9

## 2024-01-31 RX ORDER — TRAZODONE HYDROCHLORIDE 50 MG/1
TABLET ORAL
Qty: 60 TABLET | Refills: 0 | Status: SHIPPED | OUTPATIENT
Start: 2024-01-31

## 2024-03-11 ENCOUNTER — TELEPHONE (OUTPATIENT)
Dept: PULMONOLOGY | Age: 74
End: 2024-03-11

## 2024-03-11 NOTE — PROGRESS NOTES
heterozygous, given that no evidence of elastase-induced damage was detected in an FF homozygous patient.[id number] Importantly, the F allele is a relatively uncommon variant that is found mainly in Europeans, reaching its highest frequency in [place] (0.9% in [employer]). Considering that there is also some heterogeneity in the clinical manifestations of AATD cases, the study of additional patients would be fundamental to clarify whether FF subjects are susceptible to lung disease, or not. Still, in no circumstance, the respiratory manifestations would be a consequence of the lower AAT concentrations given that the secretion of F allele is within normal ranges.[id number] There are recent reports of additional dysfunctional variants but their rarity has prevented so far, a detailed evaluation of their association with AATD disease.[id number]   .00  .

## 2024-03-11 NOTE — TELEPHONE ENCOUNTER
TRIAGE CALL      Complaint: cough /congestion  Cough: yes  Productive:  yes/ yellow  Bloody Sputum:  no  Increased SOB/Wheezing:  wheezing and sob  Duration: 2 months   Fever/Chills: no  OTC Meds tried: musinex     Feels like me may needs a cxr

## 2024-03-11 NOTE — TELEPHONE ENCOUNTER
Last seen: 1/26/24  Hx: COPD, bronchiectasis, CAROLINA w/ CPAP, alpha1 deficient    Patient call reporting increased coughing & congestion, productive of yellow sputum, wheezing & sob, feel like he needs a CXR.    Contacted patient to review, productive cough w/ yellow sometimes clear sputum; using albuterol nebulizer average BID; has tried several different OTC medications to try to clear up the excess mucous; continues to use breztri daily; no fevers; asking if he can have a CXR since insurance would not cover lung scan this year, advised provider would need to assess and order, worked in for office appt tomorrow afternoon.

## 2024-03-12 ENCOUNTER — OFFICE VISIT (OUTPATIENT)
Dept: PULMONOLOGY | Age: 74
End: 2024-03-12
Payer: MEDICARE

## 2024-03-12 VITALS
DIASTOLIC BLOOD PRESSURE: 74 MMHG | RESPIRATION RATE: 18 BRPM | WEIGHT: 184 LBS | HEIGHT: 66 IN | OXYGEN SATURATION: 91 % | BODY MASS INDEX: 29.57 KG/M2 | SYSTOLIC BLOOD PRESSURE: 134 MMHG | TEMPERATURE: 98.2 F | HEART RATE: 81 BPM

## 2024-03-12 DIAGNOSIS — J47.1 BRONCHIECTASIS WITH (ACUTE) EXACERBATION (HCC): ICD-10-CM

## 2024-03-12 DIAGNOSIS — J47.1 BRONCHIECTASIS WITH (ACUTE) EXACERBATION (HCC): Primary | ICD-10-CM

## 2024-03-12 PROBLEM — F33.0 MAJOR DEPRESSIVE DISORDER, RECURRENT, MILD (HCC): Status: ACTIVE | Noted: 2024-03-12

## 2024-03-12 PROBLEM — F33.9 MAJOR DEPRESSIVE DISORDER, RECURRENT, UNSPECIFIED (HCC): Status: ACTIVE | Noted: 2024-03-12

## 2024-03-12 PROBLEM — F33.1 MAJOR DEPRESSIVE DISORDER, RECURRENT, MODERATE (HCC): Status: ACTIVE | Noted: 2024-03-12

## 2024-03-12 PROCEDURE — 3017F COLORECTAL CA SCREEN DOC REV: CPT | Performed by: INTERNAL MEDICINE

## 2024-03-12 PROCEDURE — 1036F TOBACCO NON-USER: CPT | Performed by: INTERNAL MEDICINE

## 2024-03-12 PROCEDURE — G8484 FLU IMMUNIZE NO ADMIN: HCPCS | Performed by: INTERNAL MEDICINE

## 2024-03-12 PROCEDURE — G8417 CALC BMI ABV UP PARAM F/U: HCPCS | Performed by: INTERNAL MEDICINE

## 2024-03-12 PROCEDURE — 99214 OFFICE O/P EST MOD 30 MIN: CPT | Performed by: INTERNAL MEDICINE

## 2024-03-12 PROCEDURE — G8427 DOCREV CUR MEDS BY ELIG CLIN: HCPCS | Performed by: INTERNAL MEDICINE

## 2024-03-12 PROCEDURE — 1123F ACP DISCUSS/DSCN MKR DOCD: CPT | Performed by: INTERNAL MEDICINE

## 2024-03-12 PROCEDURE — 3075F SYST BP GE 130 - 139MM HG: CPT | Performed by: INTERNAL MEDICINE

## 2024-03-12 PROCEDURE — 3078F DIAST BP <80 MM HG: CPT | Performed by: INTERNAL MEDICINE

## 2024-03-12 RX ORDER — PREDNISONE 10 MG/1
TABLET ORAL
Qty: 17 TABLET | Refills: 0 | Status: SHIPPED | OUTPATIENT
Start: 2024-03-12 | End: 2024-03-22

## 2024-03-12 RX ORDER — LEVOFLOXACIN 750 MG/1
750 TABLET, FILM COATED ORAL DAILY
Qty: 7 TABLET | Refills: 0 | Status: SHIPPED | OUTPATIENT
Start: 2024-03-12 | End: 2024-03-19

## 2024-03-12 RX ORDER — SODIUM CHLORIDE FOR INHALATION 3 %
4 VIAL, NEBULIZER (ML) INHALATION 2 TIMES DAILY
Qty: 240 ML | Refills: 11 | Status: SHIPPED | OUTPATIENT
Start: 2024-03-12 | End: 2025-03-07

## 2024-03-12 RX ORDER — HYDROCODONE BITARTRATE AND HOMATROPINE METHYLBROMIDE ORAL SOLUTION 5; 1.5 MG/5ML; MG/5ML
5 LIQUID ORAL
Qty: 120 ML | Refills: 0 | Status: SHIPPED | OUTPATIENT
Start: 2024-03-12 | End: 2024-04-11

## 2024-03-13 DIAGNOSIS — J47.1 BRONCHIECTASIS WITH (ACUTE) EXACERBATION (HCC): ICD-10-CM

## 2024-03-13 DIAGNOSIS — G47.00 INSOMNIA, UNSPECIFIED TYPE: ICD-10-CM

## 2024-03-15 LAB
BACTERIA SPEC CULT: NORMAL
GRAM STN SPEC: NORMAL
SERVICE CMNT-IMP: NORMAL

## 2024-03-15 RX ORDER — TRAZODONE HYDROCHLORIDE 50 MG/1
TABLET ORAL
Qty: 60 TABLET | Refills: 0 | Status: SHIPPED | OUTPATIENT
Start: 2024-03-15

## 2024-03-16 LAB
ACID FAST STN SPEC: NEGATIVE
SPECIMEN PREPARATION: NORMAL
SPECIMEN SOURCE: NORMAL

## 2024-03-18 ENCOUNTER — TELEPHONE (OUTPATIENT)
Dept: PULMONOLOGY | Age: 74
End: 2024-03-18

## 2024-03-18 NOTE — TELEPHONE ENCOUNTER
----- Message from Maggi Barros MD sent at 3/14/2024 10:38 AM EDT -----  Please  let patient know that no particular organism grew on sputum cultures.  Maggi Barros MD

## 2024-04-08 ENCOUNTER — TELEPHONE (OUTPATIENT)
Dept: PULMONOLOGY | Age: 74
End: 2024-04-08

## 2024-04-08 DIAGNOSIS — Z87.891 PERSONAL HISTORY OF TOBACCO USE, PRESENTING HAZARDS TO HEALTH: Primary | ICD-10-CM

## 2024-04-08 DIAGNOSIS — J44.9 STAGE 2 MODERATE COPD BY GOLD CLASSIFICATION (HCC): ICD-10-CM

## 2024-04-08 NOTE — TELEPHONE ENCOUNTER
Shonda from Radiology called and need new LDCT order because the one that was put in does not have the questionnaire. New order placed and patient notified.

## 2024-04-16 LAB
ALBUMIN SERPL-MCNC: 4.1 G/DL (ref 3.8–4.8)
ALBUMIN/GLOB SERPL: 1.5 {RATIO} (ref 1.2–2.2)
ALP SERPL-CCNC: 129 IU/L (ref 44–121)
ALT SERPL-CCNC: 22 IU/L (ref 0–44)
AST SERPL-CCNC: 23 IU/L (ref 0–40)
BASOPHILS # BLD AUTO: 0.1 X10E3/UL (ref 0–0.2)
BASOPHILS NFR BLD AUTO: 1 %
BILIRUB SERPL-MCNC: 0.4 MG/DL (ref 0–1.2)
BUN SERPL-MCNC: 11 MG/DL (ref 8–27)
BUN/CREAT SERPL: 11 (ref 10–24)
CALCIUM SERPL-MCNC: 9.4 MG/DL (ref 8.6–10.2)
CHLORIDE SERPL-SCNC: 103 MMOL/L (ref 96–106)
CHOLEST SERPL-MCNC: 128 MG/DL (ref 100–199)
CO2 SERPL-SCNC: 22 MMOL/L (ref 20–29)
CREAT SERPL-MCNC: 1.02 MG/DL (ref 0.76–1.27)
EGFRCR SERPLBLD CKD-EPI 2021: 78 ML/MIN/1.73
EOSINOPHIL # BLD AUTO: 0.3 X10E3/UL (ref 0–0.4)
EOSINOPHIL NFR BLD AUTO: 5 %
ERYTHROCYTE [DISTWIDTH] IN BLOOD BY AUTOMATED COUNT: 12.1 % (ref 11.6–15.4)
GLOBULIN SER CALC-MCNC: 2.7 G/DL (ref 1.5–4.5)
GLUCOSE SERPL-MCNC: 101 MG/DL (ref 70–99)
HCT VFR BLD AUTO: 47.3 % (ref 37.5–51)
HDLC SERPL-MCNC: 51 MG/DL
HGB BLD-MCNC: 15.5 G/DL (ref 13–17.7)
IMM GRANULOCYTES # BLD AUTO: 0 X10E3/UL (ref 0–0.1)
IMM GRANULOCYTES NFR BLD AUTO: 0 %
IMP & REVIEW OF LAB RESULTS: NORMAL
LDLC SERPL CALC-MCNC: 56 MG/DL (ref 0–99)
LYMPHOCYTES # BLD AUTO: 1.6 X10E3/UL (ref 0.7–3.1)
LYMPHOCYTES NFR BLD AUTO: 24 %
MCH RBC QN AUTO: 32.4 PG (ref 26.6–33)
MCHC RBC AUTO-ENTMCNC: 32.8 G/DL (ref 31.5–35.7)
MCV RBC AUTO: 99 FL (ref 79–97)
MONOCYTES # BLD AUTO: 1.1 X10E3/UL (ref 0.1–0.9)
MONOCYTES NFR BLD AUTO: 17 %
NEUTROPHILS # BLD AUTO: 3.5 X10E3/UL (ref 1.4–7)
NEUTROPHILS NFR BLD AUTO: 53 %
PLATELET # BLD AUTO: 284 X10E3/UL (ref 150–450)
POTASSIUM SERPL-SCNC: 4.5 MMOL/L (ref 3.5–5.2)
PROT SERPL-MCNC: 6.8 G/DL (ref 6–8.5)
PSA SERPL-MCNC: 0.6 NG/ML (ref 0–4)
RBC # BLD AUTO: 4.79 X10E6/UL (ref 4.14–5.8)
SODIUM SERPL-SCNC: 138 MMOL/L (ref 134–144)
SPECIMEN STATUS REPORT: NORMAL
TRIGL SERPL-MCNC: 114 MG/DL (ref 0–149)
TSH SERPL DL<=0.005 MIU/L-ACNC: 1.08 UIU/ML (ref 0.45–4.5)
URATE SERPL-MCNC: 5.3 MG/DL (ref 3.8–8.4)
VLDLC SERPL CALC-MCNC: 21 MG/DL (ref 5–40)
WBC # BLD AUTO: 6.6 X10E3/UL (ref 3.4–10.8)

## 2024-04-17 ENCOUNTER — TELEPHONE (OUTPATIENT)
Dept: PULMONOLOGY | Age: 74
End: 2024-04-17

## 2024-04-17 NOTE — TELEPHONE ENCOUNTER
Thais with central scheduling says that that they need a new order . The patient has been stopped smoking for 15 years . She said the order should read CT chest without . That's all it needs  ask for to be sent this way

## 2024-04-18 ENCOUNTER — HOSPITAL ENCOUNTER (OUTPATIENT)
Dept: CT IMAGING | Age: 74
Discharge: HOME | End: 2024-04-18
Attending: INTERNAL MEDICINE
Payer: MEDICARE

## 2024-04-18 DIAGNOSIS — J44.9 STAGE 2 MODERATE COPD BY GOLD CLASSIFICATION (HCC): ICD-10-CM

## 2024-04-18 DIAGNOSIS — Z87.891 PERSONAL HISTORY OF TOBACCO USE, PRESENTING HAZARDS TO HEALTH: ICD-10-CM

## 2024-04-18 PROCEDURE — 71250 CT THORAX DX C-: CPT

## 2024-04-23 ENCOUNTER — OFFICE VISIT (OUTPATIENT)
Dept: INTERNAL MEDICINE CLINIC | Facility: CLINIC | Age: 74
End: 2024-04-23
Payer: MEDICARE

## 2024-04-23 VITALS
OXYGEN SATURATION: 88 % | DIASTOLIC BLOOD PRESSURE: 70 MMHG | WEIGHT: 185 LBS | BODY MASS INDEX: 29.86 KG/M2 | TEMPERATURE: 97 F | SYSTOLIC BLOOD PRESSURE: 126 MMHG | HEART RATE: 102 BPM

## 2024-04-23 DIAGNOSIS — J01.10 ACUTE FRONTAL SINUSITIS, RECURRENCE NOT SPECIFIED: ICD-10-CM

## 2024-04-23 DIAGNOSIS — R05.8 PRODUCTIVE COUGH: Primary | ICD-10-CM

## 2024-04-23 PROCEDURE — G8427 DOCREV CUR MEDS BY ELIG CLIN: HCPCS | Performed by: NURSE PRACTITIONER

## 2024-04-23 PROCEDURE — 3074F SYST BP LT 130 MM HG: CPT | Performed by: NURSE PRACTITIONER

## 2024-04-23 PROCEDURE — 1036F TOBACCO NON-USER: CPT | Performed by: NURSE PRACTITIONER

## 2024-04-23 PROCEDURE — 99214 OFFICE O/P EST MOD 30 MIN: CPT | Performed by: NURSE PRACTITIONER

## 2024-04-23 PROCEDURE — G8417 CALC BMI ABV UP PARAM F/U: HCPCS | Performed by: NURSE PRACTITIONER

## 2024-04-23 PROCEDURE — 1123F ACP DISCUSS/DSCN MKR DOCD: CPT | Performed by: NURSE PRACTITIONER

## 2024-04-23 PROCEDURE — 3017F COLORECTAL CA SCREEN DOC REV: CPT | Performed by: NURSE PRACTITIONER

## 2024-04-23 PROCEDURE — 3078F DIAST BP <80 MM HG: CPT | Performed by: NURSE PRACTITIONER

## 2024-04-23 RX ORDER — AMOXICILLIN AND CLAVULANATE POTASSIUM 875; 125 MG/1; MG/1
1 TABLET, FILM COATED ORAL 2 TIMES DAILY
Qty: 20 TABLET | Refills: 0 | Status: SHIPPED | OUTPATIENT
Start: 2024-04-23 | End: 2024-05-03

## 2024-04-23 ASSESSMENT — PATIENT HEALTH QUESTIONNAIRE - PHQ9
9. THOUGHTS THAT YOU WOULD BE BETTER OFF DEAD, OR OF HURTING YOURSELF: NOT AT ALL
7. TROUBLE CONCENTRATING ON THINGS, SUCH AS READING THE NEWSPAPER OR WATCHING TELEVISION: NOT AT ALL
SUM OF ALL RESPONSES TO PHQ QUESTIONS 1-9: 0
6. FEELING BAD ABOUT YOURSELF - OR THAT YOU ARE A FAILURE OR HAVE LET YOURSELF OR YOUR FAMILY DOWN: NOT AT ALL
8. MOVING OR SPEAKING SO SLOWLY THAT OTHER PEOPLE COULD HAVE NOTICED. OR THE OPPOSITE, BEING SO FIGETY OR RESTLESS THAT YOU HAVE BEEN MOVING AROUND A LOT MORE THAN USUAL: NOT AT ALL
SUM OF ALL RESPONSES TO PHQ QUESTIONS 1-9: 0
4. FEELING TIRED OR HAVING LITTLE ENERGY: NOT AT ALL
3. TROUBLE FALLING OR STAYING ASLEEP: NOT AT ALL
SUM OF ALL RESPONSES TO PHQ QUESTIONS 1-9: 0
2. FEELING DOWN, DEPRESSED OR HOPELESS: NOT AT ALL
5. POOR APPETITE OR OVEREATING: NOT AT ALL
SUM OF ALL RESPONSES TO PHQ QUESTIONS 1-9: 0

## 2024-04-23 NOTE — PATIENT INSTRUCTIONS
Use Afrin 2 sprays to each nostril twice a day for 3 days maximum.  Wait 5 to 10 minutes between first and second sprays.    Use sterile nasal saline, such as \"Simply Saline,\" to irrigate nasal passages and moisturize mucosal membranes.  Gently blow nose after using sterile nasal saline.

## 2024-04-23 NOTE — PROGRESS NOTES
nitroGLYCERIN (NITROSTAT) 0.3 MG SL tablet Place 1 tablet under the tongue      omeprazole (PRILOSEC) 40 MG delayed release capsule Take 1 capsule by mouth      rosuvastatin (CRESTOR) 40 MG tablet Take 1 tablet by mouth      tamsulosin (FLOMAX) 0.4 MG capsule Take 1 capsule by mouth       No current facility-administered medications for this visit.         Reviewed and updated this visit by provider:  Tobacco  Allergies  Meds  Problems  Med Hx  Surg Hx  Fam Hx         Review of Systems   Constitutional:  Positive for chills and fever (low grade).   HENT:  Positive for congestion, hearing loss (hearing aids), sinus pressure, sinus pain and sore throat.    Respiratory:  Positive for cough (frequent) and shortness of breath (with exertion). Negative for wheezing.         OBJECTIVE:    /70 (Site: Left Upper Arm, Position: Sitting, Cuff Size: Small Adult)   Pulse (!) 102   Temp 97 °F (36.1 °C)   Wt 83.9 kg (185 lb)   SpO2 (!) 88%   BMI 29.86 kg/m²      Physical Exam  Vitals and nursing note reviewed.   Constitutional:       Appearance: Normal appearance.   HENT:      Head: Normocephalic.      Right Ear: Tympanic membrane, ear canal and external ear normal. Decreased hearing noted.      Left Ear: Tympanic membrane, ear canal and external ear normal. Decreased hearing noted.      Nose: Mucosal edema and congestion present.      Right Turbinates: Enlarged and swollen.      Left Turbinates: Enlarged and swollen.      Right Sinus: Maxillary sinus tenderness and frontal sinus tenderness present.      Left Sinus: Frontal sinus tenderness present.      Comments: Purulent thick discharge right posterior nare     Mouth/Throat:      Lips: Pink.      Mouth: Mucous membranes are moist.      Pharynx: Posterior oropharyngeal erythema present. No oropharyngeal exudate or uvula swelling.   Cardiovascular:      Rate and Rhythm: Normal rate.   Pulmonary:      Effort: Pulmonary effort is normal.      Breath sounds:

## 2024-04-24 DIAGNOSIS — E78.2 MIXED HYPERLIPIDEMIA: Chronic | ICD-10-CM

## 2024-04-24 DIAGNOSIS — Z95.1 S/P CABG (CORONARY ARTERY BYPASS GRAFT): ICD-10-CM

## 2024-04-24 DIAGNOSIS — R35.1 BENIGN PROSTATIC HYPERPLASIA WITH NOCTURIA: ICD-10-CM

## 2024-04-24 DIAGNOSIS — N40.1 BENIGN PROSTATIC HYPERPLASIA WITH NOCTURIA: ICD-10-CM

## 2024-04-24 DIAGNOSIS — I10 ESSENTIAL HYPERTENSION: Chronic | ICD-10-CM

## 2024-04-25 ENCOUNTER — TELEPHONE (OUTPATIENT)
Dept: PULMONOLOGY | Age: 74
End: 2024-04-25

## 2024-04-27 LAB
ACID FAST STN SPEC: NEGATIVE
MYCOBACTERIUM SPEC QL CULT: NEGATIVE
SPECIMEN PREPARATION: NORMAL
SPECIMEN SOURCE: NORMAL

## 2024-04-30 ENCOUNTER — TELEMEDICINE (OUTPATIENT)
Dept: INTERNAL MEDICINE CLINIC | Facility: CLINIC | Age: 74
End: 2024-04-30
Payer: MEDICARE

## 2024-04-30 DIAGNOSIS — E78.2 MIXED HYPERLIPIDEMIA: ICD-10-CM

## 2024-04-30 DIAGNOSIS — I10 ESSENTIAL HYPERTENSION: Primary | ICD-10-CM

## 2024-04-30 DIAGNOSIS — J01.10 ACUTE FRONTAL SINUSITIS, RECURRENCE NOT SPECIFIED: ICD-10-CM

## 2024-04-30 DIAGNOSIS — Z95.1 S/P CABG (CORONARY ARTERY BYPASS GRAFT): ICD-10-CM

## 2024-04-30 PROCEDURE — 3017F COLORECTAL CA SCREEN DOC REV: CPT | Performed by: NURSE PRACTITIONER

## 2024-04-30 PROCEDURE — 1036F TOBACCO NON-USER: CPT | Performed by: NURSE PRACTITIONER

## 2024-04-30 PROCEDURE — 99214 OFFICE O/P EST MOD 30 MIN: CPT | Performed by: NURSE PRACTITIONER

## 2024-04-30 PROCEDURE — G8417 CALC BMI ABV UP PARAM F/U: HCPCS | Performed by: NURSE PRACTITIONER

## 2024-04-30 PROCEDURE — G8427 DOCREV CUR MEDS BY ELIG CLIN: HCPCS | Performed by: NURSE PRACTITIONER

## 2024-04-30 PROCEDURE — 1123F ACP DISCUSS/DSCN MKR DOCD: CPT | Performed by: NURSE PRACTITIONER

## 2024-04-30 RX ORDER — AMLODIPINE BESYLATE 5 MG/1
5 TABLET ORAL DAILY
Qty: 100 TABLET | Refills: 3
Start: 2024-04-30

## 2024-04-30 RX ORDER — AMLODIPINE BESYLATE 2.5 MG/1
2.5 TABLET ORAL DAILY
Qty: 30 TABLET | Refills: 11 | Status: CANCELLED | OUTPATIENT
Start: 2024-04-30

## 2024-04-30 SDOH — ECONOMIC STABILITY: FOOD INSECURITY: WITHIN THE PAST 12 MONTHS, YOU WORRIED THAT YOUR FOOD WOULD RUN OUT BEFORE YOU GOT MONEY TO BUY MORE.: NEVER TRUE

## 2024-04-30 SDOH — ECONOMIC STABILITY: FOOD INSECURITY: WITHIN THE PAST 12 MONTHS, THE FOOD YOU BOUGHT JUST DIDN'T LAST AND YOU DIDN'T HAVE MONEY TO GET MORE.: NEVER TRUE

## 2024-04-30 SDOH — ECONOMIC STABILITY: HOUSING INSECURITY
IN THE LAST 12 MONTHS, WAS THERE A TIME WHEN YOU DID NOT HAVE A STEADY PLACE TO SLEEP OR SLEPT IN A SHELTER (INCLUDING NOW)?: NO

## 2024-04-30 SDOH — ECONOMIC STABILITY: INCOME INSECURITY: HOW HARD IS IT FOR YOU TO PAY FOR THE VERY BASICS LIKE FOOD, HOUSING, MEDICAL CARE, AND HEATING?: NOT HARD AT ALL

## 2024-04-30 NOTE — PROGRESS NOTES
Tramaine aHrding, was evaluated through a synchronous (real-time) audio-video encounter. The patient (or guardian if applicable) is aware that this is a billable service, which includes applicable co-pays. This Virtual Visit was conducted with patient's (and/or legal guardian's) consent. Patient identification was verified, and a caregiver was present when appropriate.   The patient was located at Home: [address] [place] [address]  Provider was located at Home (Appt Dept State): [place]  Confirm you are appropriately licensed, registered, or certified to deliver care in the state where the patient is located as indicated above. If you are not or unsure, please re-schedule the visit: Yes, I confirm.     Tramaine Harding (:  1950) is a Established patient, presenting virtually for evaluation of the followin/97,  149/80 heart rate 88,     Assessment & Plan   Below is the assessment and plan developed based on review of pertinent history, physical exam, labs, studies, and medications.  1. Essential hypertension  -     amLODIPine (NORVASC) 5 MG tablet; Take 1 tablet by mouth daily, Disp-100 tablet, R-3NO PRINT  -     CBC with Auto Differential; Future  2. Mixed hyperlipidemia  -     Lipid Panel; Future  -     Comprehensive Metabolic Panel; Future  3. S/P CABG (coronary artery bypass graft)  -     Lipid Panel; Future  -     Comprehensive Metabolic Panel; Future  4. Acute frontal sinusitis, recurrence not specified    Return in about 6 months (around 10/30/2024) for Follow-Up, HTN, HLD, with labs at [facility].   Cough/secretions improving but cough still frequent.    Continue efforts to avoid post-nasal drip.  Lab orders entered to have prior to next visit.  Sinusitis resolving.  Likely associated with allergic rhinitis.  Reports his eyes are still itching/irritated.     Subjective   HPI  Still wheezing \"but maybe a little better.\"  Feels rattling in lung fields when has to cough.  Coughs hard then

## 2024-05-01 ENCOUNTER — OFFICE VISIT (OUTPATIENT)
Age: 74
End: 2024-05-01
Payer: MEDICARE

## 2024-05-01 VITALS — WEIGHT: 180.9 LBS | BODY MASS INDEX: 29.2 KG/M2

## 2024-05-01 DIAGNOSIS — Z98.1 S/P LUMBAR FUSION: ICD-10-CM

## 2024-05-01 DIAGNOSIS — M46.1 SACROILIITIS (HCC): ICD-10-CM

## 2024-05-01 DIAGNOSIS — M51.36 LUMBAR DEGENERATIVE DISC DISEASE: ICD-10-CM

## 2024-05-01 DIAGNOSIS — M54.50 LOW BACK PAIN, UNSPECIFIED BACK PAIN LATERALITY, UNSPECIFIED CHRONICITY, UNSPECIFIED WHETHER SCIATICA PRESENT: Primary | ICD-10-CM

## 2024-05-01 PROCEDURE — 99204 OFFICE O/P NEW MOD 45 MIN: CPT | Performed by: NURSE PRACTITIONER

## 2024-05-01 PROCEDURE — G8428 CUR MEDS NOT DOCUMENT: HCPCS | Performed by: NURSE PRACTITIONER

## 2024-05-01 PROCEDURE — G8417 CALC BMI ABV UP PARAM F/U: HCPCS | Performed by: NURSE PRACTITIONER

## 2024-05-01 PROCEDURE — 1123F ACP DISCUSS/DSCN MKR DOCD: CPT | Performed by: NURSE PRACTITIONER

## 2024-05-01 PROCEDURE — 3017F COLORECTAL CA SCREEN DOC REV: CPT | Performed by: NURSE PRACTITIONER

## 2024-05-01 PROCEDURE — 1036F TOBACCO NON-USER: CPT | Performed by: NURSE PRACTITIONER

## 2024-05-01 RX ORDER — ALPRAZOLAM 0.5 MG/1
0.5 TABLET ORAL ONCE
Qty: 1 TABLET | Refills: 0 | Status: SHIPPED | OUTPATIENT
Start: 2024-05-01 | End: 2024-05-01

## 2024-05-01 RX ORDER — TIZANIDINE 2 MG/1
2 TABLET ORAL 3 TIMES DAILY PRN
Qty: 30 TABLET | Refills: 0 | Status: SHIPPED | OUTPATIENT
Start: 2024-05-01

## 2024-05-01 RX ORDER — METHYLPREDNISOLONE 4 MG/1
TABLET ORAL
Qty: 1 KIT | Refills: 0 | Status: SHIPPED | OUTPATIENT
Start: 2024-05-01

## 2024-05-01 NOTE — PROGRESS NOTES
Name: Tramaine Harding  YOB: 1950  Gender: male  MRN: [id number]    CC: Low back pain, bilateral buttock pain, groin pain    HPI: This is a 74 y.o. year old male who has a history of significant COPD and open heart surgery who has had a history of lumbar fusion in the past.  He has complaints of pain across the lower back radiating into his buttocks and can go into the groin.  He has had both hips replaced.  Previous lumbar fusion is from L3-S1.  He denies bladder or bowel changes.  No significant leg pain.      This patient  has had lumbar surgery in the past.     Thus far, the patient has tried : Previous surgery, injections, medication management  Current pain level: 7  Activities limited by pain: All activities            No data to display                     ROS/Meds/PSH/PMH/FH/SH: I personally reviewed the patient's collected intake data.  Below are the pertinents:    Allergies   Allergen Reactions    Codeine Rash     Tolerates Percocet without side- effects         Current Outpatient Medications:     methylPREDNISolone (MEDROL DOSEPACK) 4 MG tablet, Take by mouth as directed. Start in morning, Disp: 1 kit, Rfl: 0    tiZANidine (ZANAFLEX) 2 MG tablet, Take 1 tablet by mouth 3 times daily as needed (muscle spasm), Disp: 30 tablet, Rfl: 0    amLODIPine (NORVASC) 5 MG tablet, Take 1 tablet by mouth daily, Disp: 100 tablet, Rfl: 3    traZODone (DESYREL) 50 MG tablet, TAKE 1 - 2 TABLETS BY MOUTH NIGHTLY, Disp: 60 tablet, Rfl: 0    sodium chloride, Inhalant, 3 % nebulizer solution, Take 4 mLs by nebulization in the morning and at bedtime, Disp: 240 mL, Rfl: 11    albuterol (PROVENTIL) (2.5 MG/3ML) 0.083% nebulizer solution, Take 3 mLs by nebulization every 6 hours as needed for Wheezing, Disp: 120 each, Rfl: 3    albuterol sulfate HFA (PROVENTIL;VENTOLIN;PROAIR) 108 (90 Base) MCG/ACT inhaler, Inhale 2 puffs into the lungs every 4 hours as needed for Shortness of Breath, Disp: 3 each, Rfl: 3

## 2024-05-03 ASSESSMENT — ENCOUNTER SYMPTOMS
COUGH: 1
SHORTNESS OF BREATH: 1
SORE THROAT: 0
SINUS PRESSURE: 0
WHEEZING: 0
SINUS PAIN: 0

## 2024-05-21 ENCOUNTER — OFFICE VISIT (OUTPATIENT)
Age: 74
End: 2024-05-21
Payer: MEDICARE

## 2024-05-21 DIAGNOSIS — Z98.1 S/P LUMBAR FUSION: ICD-10-CM

## 2024-05-21 DIAGNOSIS — M46.1 SACROILIITIS (HCC): Primary | ICD-10-CM

## 2024-05-21 DIAGNOSIS — M51.36 LUMBAR DEGENERATIVE DISC DISEASE: ICD-10-CM

## 2024-05-21 PROCEDURE — 3017F COLORECTAL CA SCREEN DOC REV: CPT | Performed by: NURSE PRACTITIONER

## 2024-05-21 PROCEDURE — G8428 CUR MEDS NOT DOCUMENT: HCPCS | Performed by: NURSE PRACTITIONER

## 2024-05-21 PROCEDURE — 1036F TOBACCO NON-USER: CPT | Performed by: NURSE PRACTITIONER

## 2024-05-21 PROCEDURE — 1123F ACP DISCUSS/DSCN MKR DOCD: CPT | Performed by: NURSE PRACTITIONER

## 2024-05-21 PROCEDURE — 99213 OFFICE O/P EST LOW 20 MIN: CPT | Performed by: NURSE PRACTITIONER

## 2024-05-21 PROCEDURE — G8417 CALC BMI ABV UP PARAM F/U: HCPCS | Performed by: NURSE PRACTITIONER

## 2024-05-21 NOTE — PROGRESS NOTES
Name: Tramaine Harding  YOB: 1950  Gender: male  MRN: [id number]    CC: Follow-up low back pain bilateral buttock pain, groin pain    HPI: This is a 74 y.o. year old male who has a history of significant COPD and open heart surgery who has had a history of lumbar fusion in the past.  He has complaints of pain across the lower back radiating into his buttocks and can go into the groin.  He has had both hips replaced.  Previous lumbar fusion is from L3-S1.  He denies bladder or bowel changes.  No significant leg pain.      This patient  has had lumbar surgery in the past.     Thus far, the patient has tried : Previous surgery, injections, medication management  Current pain level: 7  Activities limited by pain: All activities    At last visit patient has findings consistent with sacroiliitis.  I discussed with him that he is definitely at high risk for developing this given the fact that he has fusion from L3-S1.  However we obtained an MRI of the lumbar spine to rule out any adjacent level advanced spinal stenosis.            No data to display                     Allergies   Allergen Reactions    Codeine Rash     Tolerates Percocet without side- effects       Current Outpatient Medications:     methylPREDNISolone (MEDROL DOSEPACK) 4 MG tablet, Take by mouth as directed. Start in morning, Disp: 1 kit, Rfl: 0    tiZANidine (ZANAFLEX) 2 MG tablet, Take 1 tablet by mouth 3 times daily as needed (muscle spasm), Disp: 30 tablet, Rfl: 0    amLODIPine (NORVASC) 5 MG tablet, Take 1 tablet by mouth daily, Disp: 100 tablet, Rfl: 3    traZODone (DESYREL) 50 MG tablet, TAKE 1 - 2 TABLETS BY MOUTH NIGHTLY, Disp: 60 tablet, Rfl: 0    sodium chloride, Inhalant, 3 % nebulizer solution, Take 4 mLs by nebulization in the morning and at bedtime, Disp: 240 mL, Rfl: 11    albuterol (PROVENTIL) (2.5 MG/3ML) 0.083% nebulizer solution, Take 3 mLs by nebulization every 6 hours as needed for Wheezing, Disp: 120 each, Rfl: 3

## 2024-06-11 ASSESSMENT — ENCOUNTER SYMPTOMS
DIARRHEA: 0
ABDOMINAL DISTENTION: 0
PHOTOPHOBIA: 0
SORE THROAT: 0
SHORTNESS OF BREATH: 1
COUGH: 0
CONSTIPATION: 0
ABDOMINAL PAIN: 0

## 2024-06-11 NOTE — PROGRESS NOTES
LABA1C 5.9 (H) 10/17/2023     Lab Results   Component Value Date     04/12/2022        No results found for: \"BNP\"     Lab Results   Component Value Date    TSH 1.080 04/15/2024        No results found for any visits on 06/13/24.         ASSESSMENT and PLAN:        Diagnoses and all orders for this visit:      SOB/CERDA -slowly worsening-no angina or CHF/volume overload symptoms-continue overnight O2, continue inhalers as prescribed but needs to be compliant with Symbicort 2 puffs twice daily every day -  Dr Papo burris at [facility].  Exercise nuclear stress test and follow-up.  Consider trial of cessation of Toprol-XL to see if this helps his breathing after nuclear stress testing (if normal)     S/P CABG (coronary artery bypass graft) - see above, [facility] [place] 9/2018 ago, exercise nuclear stress test and follow-up      Coronary artery disease involving native coronary artery of native heart without angina pectoris- stable, continue meds-more short of breath now.      Essential hypertension- increased losartan to 100mg daily - Continue with low sodium diet, continue losartan and norvasc as tolerated.   Discussed lifestyle modification with plans for low carb/low sugar/low fat diet.  Try to  eat more lean protein, vegetables, and salads.  Try to get at least 20-30min moderate intensity cardiovascular exercise at least 4-5 times weekly as tolerated with goal of weight loss in the next year.  Check daily afternoon resting/relaxed blood pressure and heart rate and follow-up.  Consider trial of cessation of long-acting beta-blockade see if this helps his breathing at next visit      Palpitations- benign PAC's and PVC's by E-cardio -none recent, continue toprol XL 25 mg daily, increase to 50 mg daily as needed for recurrent palpitations.      Non-rheumatic mitral regurgitation- stable, continue meds- minimal MR and TR on echo, normal PA pressures- echo in 2-3 years      Gastroesophageal reflux disease without

## 2024-06-13 ENCOUNTER — OFFICE VISIT (OUTPATIENT)
Age: 74
End: 2024-06-13
Payer: MEDICARE

## 2024-06-13 ENCOUNTER — EVALUATION (OUTPATIENT)
Age: 74
End: 2024-06-13
Payer: MEDICARE

## 2024-06-13 VITALS
WEIGHT: 185 LBS | BODY MASS INDEX: 29.73 KG/M2 | SYSTOLIC BLOOD PRESSURE: 139 MMHG | HEIGHT: 66 IN | HEART RATE: 84 BPM | DIASTOLIC BLOOD PRESSURE: 78 MMHG

## 2024-06-13 DIAGNOSIS — M25.60 JOINT STIFFNESS OF SPINE: ICD-10-CM

## 2024-06-13 DIAGNOSIS — R00.2 INTERMITTENT PALPITATIONS: ICD-10-CM

## 2024-06-13 DIAGNOSIS — Z74.09 IMPAIRED MOBILITY AND ADLS: ICD-10-CM

## 2024-06-13 DIAGNOSIS — M62.81 MUSCLE WEAKNESS: ICD-10-CM

## 2024-06-13 DIAGNOSIS — I34.0 MITRAL VALVE INSUFFICIENCY, UNSPECIFIED ETIOLOGY: ICD-10-CM

## 2024-06-13 DIAGNOSIS — R26.2 DIFFICULTY WALKING: ICD-10-CM

## 2024-06-13 DIAGNOSIS — G47.33 OSA (OBSTRUCTIVE SLEEP APNEA): Chronic | ICD-10-CM

## 2024-06-13 DIAGNOSIS — Z78.9 IMPAIRED MOBILITY AND ADLS: ICD-10-CM

## 2024-06-13 DIAGNOSIS — I10 ESSENTIAL HYPERTENSION: Chronic | ICD-10-CM

## 2024-06-13 DIAGNOSIS — Z95.1 S/P CABG (CORONARY ARTERY BYPASS GRAFT): ICD-10-CM

## 2024-06-13 DIAGNOSIS — M54.41 CHRONIC BILATERAL LOW BACK PAIN WITH BILATERAL SCIATICA: Primary | ICD-10-CM

## 2024-06-13 DIAGNOSIS — G89.29 CHRONIC BILATERAL LOW BACK PAIN WITH BILATERAL SCIATICA: Primary | ICD-10-CM

## 2024-06-13 DIAGNOSIS — I25.10 CORONARY ARTERY DISEASE INVOLVING NATIVE CORONARY ARTERY OF NATIVE HEART WITHOUT ANGINA PECTORIS: Primary | ICD-10-CM

## 2024-06-13 DIAGNOSIS — M54.42 CHRONIC BILATERAL LOW BACK PAIN WITH BILATERAL SCIATICA: Primary | ICD-10-CM

## 2024-06-13 PROCEDURE — G8417 CALC BMI ABV UP PARAM F/U: HCPCS | Performed by: INTERNAL MEDICINE

## 2024-06-13 PROCEDURE — 3075F SYST BP GE 130 - 139MM HG: CPT | Performed by: INTERNAL MEDICINE

## 2024-06-13 PROCEDURE — G0283 ELEC STIM OTHER THAN WOUND: HCPCS | Performed by: PHYSICAL THERAPIST

## 2024-06-13 PROCEDURE — 3017F COLORECTAL CA SCREEN DOC REV: CPT | Performed by: INTERNAL MEDICINE

## 2024-06-13 PROCEDURE — 3078F DIAST BP <80 MM HG: CPT | Performed by: INTERNAL MEDICINE

## 2024-06-13 PROCEDURE — 1036F TOBACCO NON-USER: CPT | Performed by: INTERNAL MEDICINE

## 2024-06-13 PROCEDURE — G8427 DOCREV CUR MEDS BY ELIG CLIN: HCPCS | Performed by: INTERNAL MEDICINE

## 2024-06-13 PROCEDURE — 1123F ACP DISCUSS/DSCN MKR DOCD: CPT | Performed by: INTERNAL MEDICINE

## 2024-06-13 PROCEDURE — 97162 PT EVAL MOD COMPLEX 30 MIN: CPT | Performed by: PHYSICAL THERAPIST

## 2024-06-13 PROCEDURE — 97110 THERAPEUTIC EXERCISES: CPT | Performed by: PHYSICAL THERAPIST

## 2024-06-13 PROCEDURE — 99214 OFFICE O/P EST MOD 30 MIN: CPT | Performed by: INTERNAL MEDICINE

## 2024-06-13 NOTE — PROGRESS NOTES
deficits and to develop an initial HEP as noted below.  Included:   LTR 2x  SKTC 2x  SLR 10x right and 5x left  SAQ  LAQ  Sit to stand   Seated hip flexion  Seated trunk flexion  Seated HS stretch    Unattended electrical stimulation (68817/) x 15 minutes to low back in order to reduce pain and muscle spasms associated with increased activities in therapy as well as help reduce delayed pain episodes.     Patient Education on the condition/pathology, involved anatomy, and exercise rationale.    CLINICAL DECISION MAKING/ASSESSMENT     Personal Factors/co-morbidities affecting POC (1-2 Medium/3+High): coping styles/strategies  habits/routines  past/current experiences  co-morbidities as previously noted   Problem List: (1-2 Low/ 3 Medium/ 4+ High) Pain  ROM limitations  Strength deficits  Impaired posture  Impaired transfers  Impaired gait  Decreased endurance/activity Tolerance  ADL/functional limitations/modifications  Restricted recreational participation  Decreased Knowledge of Precautions  Decreased [place] with Home Exercise Program  Difficulty sleeping    Clinical decision making: low complexity with therapist able to easily and confidently determine and predict expectations and future outcomes for the POC.  Prognosis: good   Benefits and precautions of treatment explained to patient.  Tramaine Harding is a 74 y.o. male who presents to therapy today with evolving/changing clinical presentation (moderate complexity)  related to chronic low back pain. Pt would benefit from skilled physical therapy services to address the deficits noted above for return to prior level of function.    PLAN OF CARE     Effective Dates/Duration: 6/13/2024 TO 8/12/2024 (60 days).    Frequency: 2x/week   Interventions may include but are not limited to:   (05514) Therapeutic exercise to develop ROM, strength, endurance and flexibility  (60413) Therapeutic activities using dynamic activities to improve function  (70113) Manual

## 2024-06-18 NOTE — PROGRESS NOTES
[facility]  [address] [place] [id number]  Dept: [phone]      Physical Therapy Daily Note       Insurance: Today is 2/20 visits ()  Payor: Payor: MEDICARE  Billing pattern: Government- total time       Total Timed Procedure Codes: 40 min, Total Time: 55 min Modifier needed: No      Referring MD: Romulo Cai APRN*  Referral for: chronic low back pain  Onset Date: 1/1/2024      Diagnosis:     ICD-10-CM    1. Chronic bilateral low back pain with bilateral sciatica  M54.42     M54.41     G89.29       2. Joint stiffness of spine  M25.60       3. Difficulty walking  R26.2       4. Muscle weakness  M62.81       5. Impaired mobility and ADLs  Z74.09     Z78.9            Therapy precautions:None  Co-morbidities affecting plan of care: Chronic low back pain, COPD, CAD, CABG, HTN, GERD, R CHARLIE (2018), L CHARLIE (2014), Lumbar fusion L3/4/5-S1 (2017).        SUBJECTIVE     Patient reports moderate (5/10) low back pain today. He has no c/o right or left LE today.  He is unable to bend over to retrieve items from floor. He has difficulty bending over to lower or lift toilet seat.     Medications: no changes since last session    OBJECTIVE     Treatment provided today:  Therapeutic exercise (72858) x 40 min to develop ROM, strength, endurance and flexibility.  Included:   LTR 2x  SKTC 2x  Hip ER stretch 2x  Butterfly stretch 1 minute  Seated trunk flexion 2x  Seated HS stretch 1 minute    SLR 10x 2 right and left    SAQ 30x with 2#  LAQ 30x with 2#  Clams 20x  Bridges 20x    Sit to stand from 20\" seat 10x2      Followed by Unattended electrical stimulation (00998/) x 15 minutes to low back in order to reduce pain and muscle spasms associated with increased activities in therapy as well as help reduce delayed pain episodes.     Patient Education on the condition/pathology, involved anatomy, and exercise rationale.    ASSESSMENT     Patient has limited trunk flexion and extension. He

## 2024-06-19 ENCOUNTER — TREATMENT (OUTPATIENT)
Age: 74
End: 2024-06-19
Payer: MEDICARE

## 2024-06-19 DIAGNOSIS — Z78.9 IMPAIRED MOBILITY AND ADLS: ICD-10-CM

## 2024-06-19 DIAGNOSIS — M25.60 JOINT STIFFNESS OF SPINE: ICD-10-CM

## 2024-06-19 DIAGNOSIS — M62.81 MUSCLE WEAKNESS: ICD-10-CM

## 2024-06-19 DIAGNOSIS — M54.42 CHRONIC BILATERAL LOW BACK PAIN WITH BILATERAL SCIATICA: Primary | ICD-10-CM

## 2024-06-19 DIAGNOSIS — R26.2 DIFFICULTY WALKING: ICD-10-CM

## 2024-06-19 DIAGNOSIS — Z74.09 IMPAIRED MOBILITY AND ADLS: ICD-10-CM

## 2024-06-19 DIAGNOSIS — M54.41 CHRONIC BILATERAL LOW BACK PAIN WITH BILATERAL SCIATICA: Primary | ICD-10-CM

## 2024-06-19 DIAGNOSIS — G89.29 CHRONIC BILATERAL LOW BACK PAIN WITH BILATERAL SCIATICA: Primary | ICD-10-CM

## 2024-06-19 PROCEDURE — 97110 THERAPEUTIC EXERCISES: CPT | Performed by: PHYSICAL THERAPIST

## 2024-06-19 PROCEDURE — G0283 ELEC STIM OTHER THAN WOUND: HCPCS | Performed by: PHYSICAL THERAPIST

## 2024-06-25 NOTE — PROGRESS NOTES
5GVL PT [facility]  [address] [place] [id number]  Dept: [phone]      Physical Therapy Daily Note       Insurance: Today is 3/20 visits ()  Payor: Payor: MEDICARE  Billing pattern: Government- total time       Total Timed Procedure Codes:40 min, Total Time: 55 min Modifier needed: No      Referring MD: Romulo Cai APRN*  Referral for: chronic low back pain  Onset Date: 1/1/2024      Diagnosis:     ICD-10-CM    1. Chronic bilateral low back pain with bilateral sciatica  M54.42     M54.41     G89.29       2. Joint stiffness of spine  M25.60       3. Difficulty walking  R26.2       4. Muscle weakness  M62.81       5. Impaired mobility and ADLs  Z74.09     Z78.9            Therapy precautions:None  Co-morbidities affecting plan of care: Chronic low back pain, COPD, CAD, CABG, HTN, GERD, R CHARLIE (2018), L CHARLIE (2014), Lumbar fusion L3/4/5-S1 (2017).        SUBJECTIVE     Patient reports moderate (5/10) low back pain today. He states exercises seem to be improving flexibility and slight decrease in low back pain. He has no c/o right or left LE today.  He is unable to bend over to retrieve items from floor. He has difficulty bending over to lower or lift toilet seat.     Medications: no changes since last session    OBJECTIVE     Treatment provided today:  Therapeutic exercise (11096) x 40 min to develop ROM, strength, endurance and flexibility.  Included:   LTR 2x  SKTC 2x  Hip ER stretch 2x  Butterfly stretch 1 minute  Seated trunk flexion 2x  Seated HS stretch 1 minute    SLR 20x  each leg    SAQ 30x with 2#  LAQ 30x with 2#  Clams 25x  Bridges 25x  Hip IR with ball squeeze using LB loop    Sit to stand from 20\" seat 10x2  Side step without resistance 2x      Followed by Unattended electrical stimulation (96411/) x 15 minutes to low back in order to reduce pain and muscle spasms associated with increased activities in therapy as well as help reduce delayed pain episodes.

## 2024-06-26 ENCOUNTER — TREATMENT (OUTPATIENT)
Age: 74
End: 2024-06-26
Payer: MEDICARE

## 2024-06-26 DIAGNOSIS — M54.41 CHRONIC BILATERAL LOW BACK PAIN WITH BILATERAL SCIATICA: Primary | ICD-10-CM

## 2024-06-26 DIAGNOSIS — Z78.9 IMPAIRED MOBILITY AND ADLS: ICD-10-CM

## 2024-06-26 DIAGNOSIS — Z74.09 IMPAIRED MOBILITY AND ADLS: ICD-10-CM

## 2024-06-26 DIAGNOSIS — G89.29 CHRONIC BILATERAL LOW BACK PAIN WITH BILATERAL SCIATICA: Primary | ICD-10-CM

## 2024-06-26 DIAGNOSIS — M54.42 CHRONIC BILATERAL LOW BACK PAIN WITH BILATERAL SCIATICA: Primary | ICD-10-CM

## 2024-06-26 DIAGNOSIS — R26.2 DIFFICULTY WALKING: ICD-10-CM

## 2024-06-26 DIAGNOSIS — M25.60 JOINT STIFFNESS OF SPINE: ICD-10-CM

## 2024-06-26 DIAGNOSIS — M62.81 MUSCLE WEAKNESS: ICD-10-CM

## 2024-06-26 PROCEDURE — G0283 ELEC STIM OTHER THAN WOUND: HCPCS | Performed by: PHYSICAL THERAPIST

## 2024-06-26 PROCEDURE — 97110 THERAPEUTIC EXERCISES: CPT | Performed by: PHYSICAL THERAPIST

## 2024-07-01 NOTE — PROGRESS NOTES
stretching right and left hip flexors and quads/STM to right and left IT bands and quads).     Followed by Unattended electrical stimulation (93862/) x 15 minutes to low back in order to reduce pain and muscle spasms associated with increased activities in therapy as well as help reduce delayed pain episodes.     Patient Education on the condition/pathology, involved anatomy, and exercise rationale.    ASSESSMENT     Patient has limited trunk flexion and extension. Trunk flexion has improved since evaluation. He has moderate low back pain today. He did yard work this morning prior to therapy. He has no LE pain today. He has difficulty bending enough to lift and lower toilet seat. He has weak LE s. He has weak core. He has no increase in painful symptoms today with new exercises. He has tight HS s.  He has tight right and left hip flexors which limit right and left hip extension.     PLAN     Do Progress Note next week. Continue with ROM and strengthening exercises to improve function and gait. Progress exercises to patient tolerance. Use manual therapy as needed to improve ROM, reduce edema, reduce painful symptoms and improve function. Continue with modalities as needed to reduce edema, reduce painful symptoms and improve function.      PLAN OF CARE     Effective Dates/Duration: 6/13/2024 TO 8/12/2024 (60 days).    Frequency: 2x/week       GOALS     Short term goals to be met by 7/11/2024  (4 weeks):  Patient will be independent with HEP    Patient will be able to reciprocate stairs in clinic without deviation  Patient will be able to sleep for 4 hours without waking due to pain  Patient will be able to stand for 10 minutes to do ADLs without difficulty  Patient will be able to ambulate safely for community distances with good posture    Patient will report 4/10 pain low back with activity   Patient will be able to move sit to stand from 20\" seat without difficulty   Patient will be able to flex trunk enough

## 2024-07-02 ENCOUNTER — TELEPHONE (OUTPATIENT)
Age: 74
End: 2024-07-02

## 2024-07-02 ENCOUNTER — TREATMENT (OUTPATIENT)
Age: 74
End: 2024-07-02
Payer: MEDICARE

## 2024-07-02 DIAGNOSIS — M54.41 CHRONIC BILATERAL LOW BACK PAIN WITH BILATERAL SCIATICA: Primary | ICD-10-CM

## 2024-07-02 DIAGNOSIS — Z78.9 IMPAIRED MOBILITY AND ADLS: ICD-10-CM

## 2024-07-02 DIAGNOSIS — M25.60 JOINT STIFFNESS OF SPINE: ICD-10-CM

## 2024-07-02 DIAGNOSIS — M62.81 MUSCLE WEAKNESS: ICD-10-CM

## 2024-07-02 DIAGNOSIS — R26.2 DIFFICULTY WALKING: ICD-10-CM

## 2024-07-02 DIAGNOSIS — Z74.09 IMPAIRED MOBILITY AND ADLS: ICD-10-CM

## 2024-07-02 DIAGNOSIS — G89.29 CHRONIC BILATERAL LOW BACK PAIN WITH BILATERAL SCIATICA: Primary | ICD-10-CM

## 2024-07-02 DIAGNOSIS — M54.42 CHRONIC BILATERAL LOW BACK PAIN WITH BILATERAL SCIATICA: Primary | ICD-10-CM

## 2024-07-02 PROCEDURE — 97140 MANUAL THERAPY 1/> REGIONS: CPT | Performed by: PHYSICAL THERAPIST

## 2024-07-02 PROCEDURE — G0283 ELEC STIM OTHER THAN WOUND: HCPCS | Performed by: PHYSICAL THERAPIST

## 2024-07-02 PROCEDURE — 97110 THERAPEUTIC EXERCISES: CPT | Performed by: PHYSICAL THERAPIST

## 2024-07-05 NOTE — PROGRESS NOTES
5GVL PT [facility]TH[facility]  [address] [place] [id number]  Dept: [phone]      Physical Therapy Discharge       Insurance: Today is 5/20 visits ()  Payor: Payor: MEDICARE  Billing pattern: Government- total time       Total Timed Procedure Codes: 40 min, Total Time: 45 min Modifier needed: No      Referring MD: Romulo Cai APRN*  Referral for: chronic low back pain  Onset Date: 1/1/2024      Diagnosis:     ICD-10-CM    1. Chronic bilateral low back pain with bilateral sciatica  M54.42     M54.41     G89.29       2. Joint stiffness of spine  M25.60       3. Difficulty walking  R26.2       4. Muscle weakness  M62.81       5. Impaired mobility and ADLs  Z74.09     Z78.9            Therapy precautions:None  Co-morbidities affecting plan of care: Chronic low back pain, COPD, CAD, CABG, HTN, GERD, R CHARLIE (2018), L CHARLIE (2014), Lumbar fusion L3/4/5-S1 (2017).        SUBJECTIVE     Patient reports moderate (6/10) low back pain today. He states exercises seem to be improving flexibility slightly, but no change in painful symptoms. He requests to continue with exercises independently at home. He wants to talk to MAR Bear, about injections. He is unable to bend over to retrieve items from floor. He has difficulty bending over to lower or lift toilet seat. He has tight HS right and left.     Medications: no changes since last session    OBJECTIVE     Treatment provided today:  Therapeutic exercise (40750) x 40 min to develop ROM, strength, endurance and flexibility.  Included:   LTR 2x  SKTC 2x    Hip ER stretch 2x  Butterfly stretch 1 minute  Seated trunk flexion 2x  Seated HS stretch 1 minute    SLR 20x 2  each leg    Clams 30x  Bridges 30x  Hip IR with ball squeeze using LB loop 30x  Standing hip 3 way 20x with LB loop    Sit to stand from 20\" seat 30x  Side step with LB loop 2x        Patient Education on the condition/pathology, involved anatomy, and exercise rationale.    AROM

## 2024-07-08 ENCOUNTER — TREATMENT (OUTPATIENT)
Age: 74
End: 2024-07-08
Payer: MEDICARE

## 2024-07-08 DIAGNOSIS — G89.29 CHRONIC BILATERAL LOW BACK PAIN WITH BILATERAL SCIATICA: Primary | ICD-10-CM

## 2024-07-08 DIAGNOSIS — Z74.09 IMPAIRED MOBILITY AND ADLS: ICD-10-CM

## 2024-07-08 DIAGNOSIS — Z78.9 IMPAIRED MOBILITY AND ADLS: ICD-10-CM

## 2024-07-08 DIAGNOSIS — M54.41 CHRONIC BILATERAL LOW BACK PAIN WITH BILATERAL SCIATICA: Primary | ICD-10-CM

## 2024-07-08 DIAGNOSIS — M62.81 MUSCLE WEAKNESS: ICD-10-CM

## 2024-07-08 DIAGNOSIS — R26.2 DIFFICULTY WALKING: ICD-10-CM

## 2024-07-08 DIAGNOSIS — M25.60 JOINT STIFFNESS OF SPINE: ICD-10-CM

## 2024-07-08 DIAGNOSIS — M54.42 CHRONIC BILATERAL LOW BACK PAIN WITH BILATERAL SCIATICA: Primary | ICD-10-CM

## 2024-07-08 PROCEDURE — 97110 THERAPEUTIC EXERCISES: CPT | Performed by: PHYSICAL THERAPIST

## 2024-07-09 ENCOUNTER — TELEPHONE (OUTPATIENT)
Dept: ORTHOPEDIC SURGERY | Age: 74
End: 2024-07-09

## 2024-07-09 DIAGNOSIS — M46.1 SACROILIITIS (HCC): Primary | ICD-10-CM

## 2024-07-09 DIAGNOSIS — M51.36 DDD (DEGENERATIVE DISC DISEASE), LUMBAR: ICD-10-CM

## 2024-07-17 NOTE — PROGRESS NOTES
30  Current smoker: No  Any current signs or symptoms of lung carcinoma: No     TECHNIQUE:  Transaxial CT imaging from the thoracic inlet through the lung bases  was performed.  Low dose protocol was implemented. (LDCT) Slice thickness is  1.25 mm. Radiation dose reduction techniques were used for this study. Our CT  scanners use one or all of the following: Automated exposure control, adjustment  of the mA and/or kV according to patient size, iterative reconstruction.     FINDINGS:  LUNGS: No pulmonary nodules.  No airspace disease. Emphysematous change.  AIRWAYS: Trachea and proximal bronchi grossly patent.  PLEURA: No effusion or thickening or calcifications.  LYMPH NODES: No enlarged axillary, hilar or mediastinal lymph nodes.  HEART: Normal size.  AORTA: Normal caliber.  CORONARIES: Prior CABG surgery.   UPPER ABDOMEN: Stable left adrenal nodule..  SKELETAL/CHEST WALL: DJD, otherwise no gross bony lesions.     IMPRESSION:  Recommend noncontrast LDCT chest CT in 12 months.  L1 Negative: No nodules .    Specimen Collected: 02/20/23 18:59 EST Last Resulted: 02/20/23 20:19 EST       CT chest 2019  IMPRESSION:        1.  Interval progression of mild bilateral upper lobe predominant panlobular   emphysema and lower lobe predominant bronchiectasis.   2.  Other chronic findings as above.       Past Medical History:   Diagnosis Date    Abnormal cardiovascular function study 1/26/2016    Adenoma of left adrenal gland     Arthritis     general    BPH (benign prostatic hyperplasia)     Chest discomfort 1/26/2016    Chronic kidney disease     Chronic low back pain     Chronic pain     back, hip    COPD (chronic obstructive pulmonary disease) (HCC)     mild per pt; no rescue inhaler; denies SOB with 1 flight of steps    Coronary artery disease     had CABG 9/14/15 s/p failed stress test    ED (erectile dysfunction)     GERD (gastroesophageal reflux disease)     controlled with med    History of basal cell cancer

## 2024-07-18 ENCOUNTER — OFFICE VISIT (OUTPATIENT)
Dept: ORTHOPEDIC SURGERY | Age: 74
End: 2024-07-18

## 2024-07-18 ENCOUNTER — OFFICE VISIT (OUTPATIENT)
Dept: PULMONOLOGY | Age: 74
End: 2024-07-18
Payer: MEDICARE

## 2024-07-18 VITALS
TEMPERATURE: 97.3 F | HEIGHT: 66 IN | SYSTOLIC BLOOD PRESSURE: 118 MMHG | DIASTOLIC BLOOD PRESSURE: 66 MMHG | BODY MASS INDEX: 30.53 KG/M2 | WEIGHT: 190 LBS | OXYGEN SATURATION: 95 % | RESPIRATION RATE: 17 BRPM | HEART RATE: 73 BPM

## 2024-07-18 DIAGNOSIS — R06.09 DYSPNEA ON EXERTION: Primary | ICD-10-CM

## 2024-07-18 DIAGNOSIS — J47.9 BRONCHIECTASIS WITHOUT COMPLICATION (HCC): ICD-10-CM

## 2024-07-18 DIAGNOSIS — J43.1 PANLOBULAR EMPHYSEMA (HCC): ICD-10-CM

## 2024-07-18 DIAGNOSIS — E88.01 ALPHA-1-ANTITRYPSIN DEFICIENCY (HCC): ICD-10-CM

## 2024-07-18 DIAGNOSIS — M46.1 SACROILIITIS (HCC): Primary | ICD-10-CM

## 2024-07-18 PROCEDURE — 99204 OFFICE O/P NEW MOD 45 MIN: CPT | Performed by: INTERNAL MEDICINE

## 2024-07-18 PROCEDURE — G8417 CALC BMI ABV UP PARAM F/U: HCPCS | Performed by: INTERNAL MEDICINE

## 2024-07-18 PROCEDURE — 1036F TOBACCO NON-USER: CPT | Performed by: INTERNAL MEDICINE

## 2024-07-18 PROCEDURE — 3078F DIAST BP <80 MM HG: CPT | Performed by: INTERNAL MEDICINE

## 2024-07-18 PROCEDURE — G8427 DOCREV CUR MEDS BY ELIG CLIN: HCPCS | Performed by: INTERNAL MEDICINE

## 2024-07-18 PROCEDURE — 1123F ACP DISCUSS/DSCN MKR DOCD: CPT | Performed by: INTERNAL MEDICINE

## 2024-07-18 PROCEDURE — 3074F SYST BP LT 130 MM HG: CPT | Performed by: INTERNAL MEDICINE

## 2024-07-18 PROCEDURE — 3023F SPIROM DOC REV: CPT | Performed by: INTERNAL MEDICINE

## 2024-07-18 PROCEDURE — 3017F COLORECTAL CA SCREEN DOC REV: CPT | Performed by: INTERNAL MEDICINE

## 2024-07-18 RX ORDER — BETAMETHASONE SODIUM PHOSPHATE AND BETAMETHASONE ACETATE 3; 3 MG/ML; MG/ML
12 INJECTION, SUSPENSION INTRA-ARTICULAR; INTRALESIONAL; INTRAMUSCULAR; SOFT TISSUE ONCE
Status: COMPLETED | OUTPATIENT
Start: 2024-07-18 | End: 2024-07-18

## 2024-07-18 RX ADMIN — BETAMETHASONE SODIUM PHOSPHATE AND BETAMETHASONE ACETATE 12 MG: 3; 3 INJECTION, SUSPENSION INTRA-ARTICULAR; INTRALESIONAL; INTRAMUSCULAR; SOFT TISSUE at 14:17

## 2024-07-18 NOTE — PROGRESS NOTES
Sacroiliac Joint Injection Procedure Note    Procedure: Bilateral sacroiliac joint steroid injection    Precautions: Tramaine Harding denies prior sensitivity to steroid, local anesthetic, iodine, or shellfish.       Consent:  Consent was obtained prior to the procedure. The procedure was discussed at length with Tramaine Harding. He was given the opportunity to ask questions regarding the procedure and its associated risks.  In addition to the potential risks associated with the procedure itself, the patient was informed both verbally and in writing of potential side effects of the use glucocorticoids.  The patient appeared to comprehend the informed consent and desired to have the procedure performed, and informed consent was signed.     He was placed in a prone position on the fluoroscopy table and the skin was prepped and draped in a routine sterile fashion. The areas to be injected were each anesthetized with 1 ml of 1% Lidocaine. A 22 gauge 3.5 inch spinal needle was carefully advanced under fluoroscopic guidance to the right sacroiliac joint space  0.5 ml of 70% of Omnipaque was injected to confirm proper needle placement and absence of subdural or vascular flow Once proper placement was confirmed, 2 ml of 0.25 marcaine and 40 mg of kenalog were injected through the spinal needle.     The above procedure was then repeated at the left sacroiliac joint space.    Fluoroscopic guidance was used intermittently over a 10-minute period to insure proper needle placement and his safety. A hard copy of the fluoroscopic image has been placed in his chart and is saved on the C-arm hard drive. He was monitored for 30 minutes after the procedure and discharged home in a stable fashion with a routine follow up.    Procedural Diagnosis:     ICD-10-CM    1. Sacroiliitis (HCC)  M46.1 XR INJECTION SI JT W WO ARTHROGRAM     betamethasone acetate-betamethasone sodium phosphate (CELESTONE) injection 12 mg         SERGEY EATON,

## 2024-07-18 NOTE — PATIENT INSTRUCTIONS
Plans:  We will check to see if we can get you a portable oxygen device.  Please start plugging in the oxygen to your CPAP  Stay on the breztri albuterol and azithromycin  Use an albuterol nebulizer on days when dyspnea is severe.    Call [facility] pulmonary rehab if you'd like to get started on their program to help with shortness of breath:  [phone]

## 2024-07-26 NOTE — PROGRESS NOTES
[facility]  [address]  [place] [address]  [phone]    PATIENT ID    Mr. Tramaine Harding  1950  His primary provider is Dona Washburn APRN - [facility]    CC: Mr. Harding returns to the clinic today for follow up of his obstructive sleep apnea.    INTERVAL HISTORY  Mr. Harding was originally diagnosed with moderate obstructive sleep apnea in 2022, most recently has been diagnosed with mild [place] in 2023 with an AHI of 8, SpO2 sharmaine 63%.  He has extensive past medical history notable for COPD on home oxygen, hypertension/hyperlipidemia, coronary artery disease status post CABG, gastroesophageal reflux disease, depression, obesity with a BMI of 30, BPH, chronic back pain, chronic insomnia.  I am meeting him for the first time today.  He notes he has really tried with CPAP and feels like this is actually hurting his sleep quality and not helping, he was fitted for an oronasal mask last visit, he does finds himself waking up all throughout the night messing with his machine and is wondering if there are any other options.  He does have home oxygen that he has not been adding to his CPAP circuit but is supposed to be on 2 L/min with his CPAP settings of 6/12 cm H2O.  He is working on getting a portable oxygen concentrator, follows with pulmonary Dr. Barros.  His sleep quality is poor and has been so for a while, he is ready to abandon CPAP use.      He does struggle with falling asleep, he drinks 2-3 drinks an evening, likes margaritas.  He will fall asleep on the couch but then when going to bed sometimes he has a hard time going to sleep, he has been on trazodone without efficacy and currently is taking 20 mg of melatonin without efficacy goes to bed around 11 gets up at 7 but estimates between the time it takes to fall asleep and the time he is up messing with his CPAP he only gets about 4 hours of sleep at night.  He has gotten sleepy when taking Benadryl in the past.  He is not using any

## 2024-07-30 ENCOUNTER — OFFICE VISIT (OUTPATIENT)
Dept: SLEEP MEDICINE | Age: 74
End: 2024-07-30
Payer: MEDICARE

## 2024-07-30 VITALS
HEART RATE: 68 BPM | OXYGEN SATURATION: 96 % | DIASTOLIC BLOOD PRESSURE: 73 MMHG | SYSTOLIC BLOOD PRESSURE: 134 MMHG | BODY MASS INDEX: 29.96 KG/M2 | RESPIRATION RATE: 16 BRPM | HEIGHT: 66 IN | WEIGHT: 186.4 LBS | TEMPERATURE: 97.2 F

## 2024-07-30 DIAGNOSIS — G47.34 NOCTURNAL HYPOXEMIA: Primary | ICD-10-CM

## 2024-07-30 DIAGNOSIS — G47.33 OSA (OBSTRUCTIVE SLEEP APNEA): Chronic | ICD-10-CM

## 2024-07-30 DIAGNOSIS — G47.00 ORGANIC INSOMNIA: ICD-10-CM

## 2024-07-30 PROCEDURE — 3075F SYST BP GE 130 - 139MM HG: CPT | Performed by: INTERNAL MEDICINE

## 2024-07-30 PROCEDURE — 3078F DIAST BP <80 MM HG: CPT | Performed by: INTERNAL MEDICINE

## 2024-07-30 PROCEDURE — 3017F COLORECTAL CA SCREEN DOC REV: CPT | Performed by: INTERNAL MEDICINE

## 2024-07-30 PROCEDURE — G8417 CALC BMI ABV UP PARAM F/U: HCPCS | Performed by: INTERNAL MEDICINE

## 2024-07-30 PROCEDURE — G8427 DOCREV CUR MEDS BY ELIG CLIN: HCPCS | Performed by: INTERNAL MEDICINE

## 2024-07-30 PROCEDURE — 1036F TOBACCO NON-USER: CPT | Performed by: INTERNAL MEDICINE

## 2024-07-30 PROCEDURE — 1123F ACP DISCUSS/DSCN MKR DOCD: CPT | Performed by: INTERNAL MEDICINE

## 2024-07-30 PROCEDURE — G2211 COMPLEX E/M VISIT ADD ON: HCPCS | Performed by: INTERNAL MEDICINE

## 2024-07-30 PROCEDURE — 99214 OFFICE O/P EST MOD 30 MIN: CPT | Performed by: INTERNAL MEDICINE

## 2024-07-30 RX ORDER — DOXEPIN HYDROCHLORIDE 10 MG/1
10 CAPSULE ORAL
Qty: 30 CAPSULE | Refills: 2 | Status: SHIPPED | OUTPATIENT
Start: 2024-07-30

## 2024-07-30 ASSESSMENT — SLEEP AND FATIGUE QUESTIONNAIRES
HOW LIKELY ARE YOU TO NOD OFF OR FALL ASLEEP WHILE SITTING AND TALKING TO SOMEONE: SLIGHT CHANCE OF DOZING
HOW LIKELY ARE YOU TO NOD OFF OR FALL ASLEEP WHILE SITTING INACTIVE IN A PUBLIC PLACE: MODERATE CHANCE OF DOZING
HOW LIKELY ARE YOU TO NOD OFF OR FALL ASLEEP WHILE SITTING AND READING: MODERATE CHANCE OF DOZING
HOW LIKELY ARE YOU TO NOD OFF OR FALL ASLEEP WHILE LYING DOWN TO REST IN THE AFTERNOON WHEN CIRCUMSTANCES PERMIT: MODERATE CHANCE OF DOZING
HOW LIKELY ARE YOU TO NOD OFF OR FALL ASLEEP WHILE SITTING QUIETLY AFTER LUNCH WITHOUT ALCOHOL: SLIGHT CHANCE OF DOZING
HOW LIKELY ARE YOU TO NOD OFF OR FALL ASLEEP IN A CAR, WHILE STOPPED FOR A FEW MINUTES IN TRAFFIC: MODERATE CHANCE OF DOZING
HOW LIKELY ARE YOU TO NOD OFF OR FALL ASLEEP WHEN YOU ARE A PASSENGER IN A CAR FOR AN HOUR WITHOUT A BREAK: MODERATE CHANCE OF DOZING
ESS TOTAL SCORE: 14
HOW LIKELY ARE YOU TO NOD OFF OR FALL ASLEEP WHILE WATCHING TV: MODERATE CHANCE OF DOZING

## 2024-07-30 NOTE — PATIENT INSTRUCTIONS
It was a pleasure seeing you today.  Here are some items that we discussed:    1.   We will discontinue CPAP and I want you to do 2 L via nasal cannula for all sleep opportunities and we will check your oxygen levels on the settings.  Please keep your CPAP machine safe in case we ever need it again in the future.    2.   Please stop melatonin as it is not working, we will try doxepin 10 mg 1 hour before bedtime okay to take up to 20 mg if 10 mg is not effective, try to use this only 3-4 times a week.    3.   I have given you a handout on sleep hygiene please read through this, recommend trying to limit yourself to only 1 drink in the afternoon to see if minimizing alcohol can help you have better sleep quality in the second half of the night.  Consider getting the book \"Hello Sleep\" by Dr. Roxanna Robert to learn about a lot of the issues we discussed today and how they can affect your sleep.      Sin Gonzalez MD  [phone]          Insomnia: Care Instructions  Overview     Insomnia is the inability to sleep well. Insomnia may make it hard for you to get to sleep, stay asleep, or sleep as long as you need to. This can make you tired and grouchy during the day. It can also make you forgetful, less effective at work, and unhappy.  Insomnia can be linked to many things. These include health problems, medicines, and stressful events.  Treatment may include treating problems that may be linked with your insomnia. Treatment also includes behavior and lifestyle changes. This may include cognitive behavioral therapy for insomnia (CBT-I). CBT-I uses different ways to help you change your thoughts and behaviors that may interfere with sleep. Your doctor can recommend specific things you can try. Examples include doing relaxation exercises, keeping regular bedtimes and wake times, limiting alcohol, and making healthy sleep habits. Some people decide to take medicine for a while to help with sleep.  Follow-up care is a key part of

## 2024-07-30 NOTE — ASSESSMENT & PLAN NOTE
Patient is struggling both with sleep onset and sleep maintenance insomnia, he has self increased his melatonin up to 20 mg and is not effective, as he has gotten sleepy on Benadryl in the past we will trial doxepin 2-3 times a week as needed.  We discussed sleep hygiene and educational handout given.  It is possible was simply converting CPAP to supplemental oxygen this also may help improve sleep quality as he reports being up and down all night messing with his CPAP machine.  We will see him back in 3 months, sooner if needed.  Doxepin 10 mg tabs taking 1-2 tabs 2-3 times a week sent to his pharmacy.  He has not responded to trazodone in the past.     He does drink a fair amount of alcohol to the point he did not want to share exact details, encouraged him to try to limit himself to 1 drink in the evening of reasonable size for 2 weeks to see if this also helps improve his sleep quality.

## 2024-07-30 NOTE — ASSESSMENT & PLAN NOTE
Patient has mild obstructive sleep apnea with significant desats related to underlying nocturnal hypoxemia.  He is really given CPAP evaluate try and is miserable and not finding any benefit, he underwent mask fitting last visit.  At this point I think it is reasonable to discontinue CPAP and try just supplemental oxygen 2 L/min, reviewed his last sleep study from 2023.  We will put him on 2 L nasal cannula for all sleep opportunities and check overnight oximetry to ensure normal oxygenation.  We will call him to discuss the results, plan to follow-up in 3 months.     He is not a candidate for oral appliance therapy due to having dentures and he is not severe enough to qualify for inspire therapy.

## 2024-08-06 NOTE — PATIENT INSTRUCTIONS
Colonoscopy with Nulytely  Pre-Procedure Instructions  Dr. Ana Segal      Patient Name:  Chantal Ren    Date:   October 14, 2024, 1:30 PM    Arrival Time: 12:30 PM      Location:  [facility]. [address] [place] [address] ([phone]) Check in at the 3rd floor GI Lab.    Please read these instructions thoroughly at least 2 weeks before your procedure. If you have any questions about these instructions, please call your physician's office at [phone].    Procedure and arrival times may occasionally change. Dependent on the facility where your procedure is scheduled you may receive a phone call 3-7 days prior to confirm those times and to review your current medications.     ** HOLD NSAIDS (non steroidal anti-inflammatories) which include Advil (Ibuprofen),Meloxicam ,Celebrex   and Aleve (Naproxen) for 7 days prior to procedure. You may take Tylenol, if needed. Also, do not take more than 81mg of Aspirin a day for 7 days prior to procedure.     **7 days prior to exam: Do not take any iron medication .**    **You will need lab work completed within 30 days before procedure.** Please have done any day after September 14th.   Orders have been placed and you can stop in the lab to have these completed.    If you have diabetes, ask your regular doctor for diet and medication restrictions.   If you take a medication to thin your blood and have not already discussed this with our office, please call us at [phone]. If you take aspirin, please discuss stopping it with our staff.    A sedative will be given to you for the exam. A urine pregnancy screening will be performed on female patients prior to sedation.    Expect to be here for 2 - 2.5 hours. You must arrange for a ride for the day of your exam. If you fail to arrange transportation with a responsible adult, your procedure will need to be cancelled and rescheduled.    Due to everyone's busy schedules, it is important  For right now, your inhalers will be:   Wixela 1 puffs twice a day. Spiriva 2 puffs in the morning. Albuterol is your rescue inhaler. [facility] now has a combination inhaler that incorporates both the wixela and the spiriva into one inhaler. That would be a lot simpler. In the future after you finish your supply of wixela and spiriva, we will change to the following regimen. Call the [facility] pharmacy at [phone] when you are ready to have the new medicine shipped. Breztri 2 puffs twice a day. Rinse mouth after. that you keep your appointment. If you must reschedule or cancel your appointment, please notify your physician's office at least 72 hours in advance.    PREPARATION  To ensure a successful exam, please follow all instructions carefully. Failure to accurately and completely prepare for your exam may result in the need for an additional procedure and both procedures will be billed to your insurance.    Please pick-up a Nulytely Prep Kit at the pharmacy your physician sent the prescription to. Do not mix the solution until the day before the procedure. Bring these instructions with you when you go to the pharmacy.    All patients including diabetics should drink regular liquids not just diet drinks.    Make arrangements for someone to drive you home after the procedure because you will be very drowsy. Please make these arrangements in advance. A taxi is not acceptable transportation. If you do not have transportation arranged, we will not be able to do the colonoscopy.    Make arrangements for someone to stay with you or check in on you frequently the rest of the day/evening until the drowsiness has completely worn off.    DAY BEFORE YOUR COLONOSCOPY:  October 13    You may have only clear liquids to eat or drink all day long. You may not have any solid foods or dairy products, and you may not eat or drink anything that is red or purple. The more clear liquids you drink, the better off you will be.    Examples of a Clear Liquid Diet:  No red or purple liquids; No alcohol; No dairy products  Water: drink at least 8 glasses of water during the day  Tea (do not add milk or creamer)/Black coffee  Clear broth or bouillon  Gatorade®, Pedialyte® or Powerade® (No red)  Carbonated and non-carbonated soft drinks (Sprite®, 7-Up®, Gingerale)  Strained fruit juices without pulp (apple, white grape, white cranberry)  Jell-O®, popsicles, hard candy (No red)    Do not drink any alcoholic beverages for at least 24 hours before the  procedure.    Do not take Lomotil, Imodium, Effer-syllium, Metamucil, Citrucel, Konsyl, Hydrocil, or FiberCon.    !! PREP INSTRUCTIONS !!  In the morning, mix the Nulytely prep with the flavor packet and one gallon of water, shake well to mix, and refrigerate to chill. Do not further dilute the prep in any way.    At 5:00 pm, start drinking half of the Nulytely prep. Drink one large (8-10 oz.) glass every 10-15 minutes. In most cases, loose, liquidy bowel movements will begin within 30 minutes to one hour. You should remain within easy access of a bathroom. Continue to drink the prep regardless of stool frequency.  You will drink half of the Nulytely prep now.  The remaining 1/2 gallon will be used tomorrow.  Put the remaining prep solution in the refrigerator.     Continue to drink clear liquids (your physician recommends 64 ounces of Gatorade) throughout the evening.    If you experience rectal irritation due to frequent stooling, you may apply Desitin, Balmex, A and D Ointment, or a similar product.    Colon Cleansing Tips  Drink adequate amounts of fluid before and after your colon cleansing to prevent dehydration.  Chill the Nulytely solution in the refrigerator. DO NOT add ice to the solution or your drinking glass.  Set a timer for every 15 minutes. Drink each 8 - 10 oz. glass of solution quickly to help flush your colon.  Stay near a toilet! You will have diarrhea.  Even if you are sitting on the toilet, continue to drink the cleansing solution every 15 minutes.  Drink all of the solution until half is gone.  If you feel nauseated or vomit, rinse your mouth with water, take a 15 to 30-minute break and then continue drinking the Nulytely solution.  You will be uncomfortable until the stool has flushed from your colon (in about 2-4 hours). You may feel chilled.  You may suck on a few hard candies (NO red or purple).  Alcohol-free baby wipes or Vaseline® may help ease skin irritation.  Over-the-counter  hydrocortisone creams, hemorrhoid treatments or Tucks may be used if desired.    DAY OF YOUR COLONOSCOPY:  October 14    Continue clear liquid diet.    At  7:30 AM. drink the other 1/2 gallon of Nulytely prep over 1 hour.    Nothing to eat or drink after  9:30 AM.    Take heart and/or blood pressure medications with a small sip of water. Do not take any other medications until after the procedure. This includes any blood-thinning or platelet-modifying medications. Examples of such medications include, but are not limited to Motrin, Ibuprofen, Advil, Aleve, Coumadin, Plavix, and Aggrenox.     In the morning, your stools should have a clear to see-through cloudy yellow appearance with no formed substance. If your stools are darker or have formed substance, please call the location where your procedure is scheduled to be done and ask for the pre-op nurse, who will get further instructions from your physician.    AFTER YOUR COLONOSCOPY:    You will receive after-procedure information and instructions. In addition:    Do not plan on going to work or doing anything for the rest of the day.    You may resume eating and drinking with no limitations following the procedure.    Please call your physician's office at [phone] if you have any further questions or if you need additional care.  Please bring your insurance information with you to your procedure.       Thank you for entrusting your care to [facility].

## 2024-08-18 NOTE — PROGRESS NOTES
Oropharynx is clear.   Eyes:      Extraocular Movements: Extraocular movements intact.      Pupils: Pupils are equal, round, and reactive to light.   Neck:      Vascular: No carotid bruit or JVD.   Cardiovascular:      Rate and Rhythm: Normal rate and regular rhythm.      Heart sounds: No murmur heard.     No friction rub. No gallop.   Pulmonary:      Effort: Pulmonary effort is normal.      Breath sounds: Normal breath sounds. No wheezing or rhonchi.   Abdominal:      General: Abdomen is flat. Bowel sounds are normal. There is no distension.      Palpations: Abdomen is soft.      Tenderness: There is no abdominal tenderness.   Musculoskeletal:         General: No swelling. Normal range of motion.      Cervical back: Normal range of motion and neck supple. No tenderness.   Skin:     General: Skin is warm and dry.   Neurological:      General: No focal deficit present.      Mental Status: He is alert and oriented to person, place, and time. Mental status is at baseline.   Psychiatric:         Mood and Affect: Mood normal.         Behavior: Behavior normal.          Medical problems and test results were reviewed with the patient today.     Lab Results   Component Value Date    LDL 56 04/15/2024       Lab Results   Component Value Date    CHOL 128 04/15/2024    CHOL 116 10/17/2023    CHOL 182 04/17/2023     Lab Results   Component Value Date    TRIG 114 04/15/2024    TRIG 87 10/17/2023    TRIG 241 (H) 04/17/2023     Lab Results   Component Value Date    HDL 51 04/15/2024    HDL 49 10/17/2023    HDL 68 04/17/2023     No components found for: \"LDLCHOLESTEROL\", \"LDLCALC\"    Lab Results   Component Value Date    VLDL 21 04/15/2024    VLDL 17 10/17/2023    VLDL 39 04/17/2023     Lab Results   Component Value Date    CHOLHDLRATIO 2.2 09/19/2022        Lab Results   Component Value Date/Time     04/15/2024 10:06 AM    K 4.5 04/15/2024 10:06 AM     04/15/2024 10:06 AM    CO2 22 04/15/2024 10:06 AM    BUN 11

## 2024-08-19 ENCOUNTER — TELEPHONE (OUTPATIENT)
Dept: PULMONOLOGY | Age: 74
End: 2024-08-19

## 2024-08-19 DIAGNOSIS — J44.9 STAGE 2 MODERATE COPD BY GOLD CLASSIFICATION (HCC): ICD-10-CM

## 2024-08-19 NOTE — TELEPHONE ENCOUNTER
Last seen: 7/18/24  Hx: CERDA, emphysema, alpha1 deficient, bronchiectasis    Patient call reporting bronchitis w/ productive cough, sob & wheezing for 2 weeks. Contacted patient reporting sats dropping into the 80s with exertion, able to recover w/ rest; using nebulizer a few times a week, not sure any change; has had TIW azithromycin but has been out for about 2 weeks, not renewed, not listed on his [facility] chart, [facility] record [facility]. Occasional ankle swelling. Has cardiology appt tomorrow as f/u from stress test done last month.   Any Rx through [facility] is free to him.   Please advise for next step.

## 2024-08-19 NOTE — TELEPHONE ENCOUNTER
TRIAGE CALL      Complaint:   bronchitsis  Cough: yes  Productive:  yes   Bloody Sputum:  no   Increased SOB/Wheezing:  yes both   Duration: 2 wks    Fever/Chills: no  OTC Meds tried: zyrtec

## 2024-08-20 ENCOUNTER — OFFICE VISIT (OUTPATIENT)
Age: 74
End: 2024-08-20
Payer: MEDICARE

## 2024-08-20 VITALS
WEIGHT: 183 LBS | DIASTOLIC BLOOD PRESSURE: 74 MMHG | HEIGHT: 66 IN | BODY MASS INDEX: 29.41 KG/M2 | SYSTOLIC BLOOD PRESSURE: 138 MMHG | HEART RATE: 72 BPM

## 2024-08-20 DIAGNOSIS — I10 ESSENTIAL HYPERTENSION: Chronic | ICD-10-CM

## 2024-08-20 DIAGNOSIS — R00.2 INTERMITTENT PALPITATIONS: ICD-10-CM

## 2024-08-20 DIAGNOSIS — Z95.1 S/P CABG (CORONARY ARTERY BYPASS GRAFT): ICD-10-CM

## 2024-08-20 DIAGNOSIS — I51.7 LVH (LEFT VENTRICULAR HYPERTROPHY): ICD-10-CM

## 2024-08-20 DIAGNOSIS — I34.0 NONRHEUMATIC MITRAL VALVE REGURGITATION: ICD-10-CM

## 2024-08-20 DIAGNOSIS — G47.33 OSA (OBSTRUCTIVE SLEEP APNEA): Chronic | ICD-10-CM

## 2024-08-20 DIAGNOSIS — I25.10 CORONARY ARTERY DISEASE INVOLVING NATIVE CORONARY ARTERY OF NATIVE HEART WITHOUT ANGINA PECTORIS: Primary | ICD-10-CM

## 2024-08-20 PROCEDURE — 3075F SYST BP GE 130 - 139MM HG: CPT | Performed by: INTERNAL MEDICINE

## 2024-08-20 PROCEDURE — G8417 CALC BMI ABV UP PARAM F/U: HCPCS | Performed by: INTERNAL MEDICINE

## 2024-08-20 PROCEDURE — 1036F TOBACCO NON-USER: CPT | Performed by: INTERNAL MEDICINE

## 2024-08-20 PROCEDURE — 3078F DIAST BP <80 MM HG: CPT | Performed by: INTERNAL MEDICINE

## 2024-08-20 PROCEDURE — G8427 DOCREV CUR MEDS BY ELIG CLIN: HCPCS | Performed by: INTERNAL MEDICINE

## 2024-08-20 PROCEDURE — 3017F COLORECTAL CA SCREEN DOC REV: CPT | Performed by: INTERNAL MEDICINE

## 2024-08-20 PROCEDURE — 99214 OFFICE O/P EST MOD 30 MIN: CPT | Performed by: INTERNAL MEDICINE

## 2024-08-20 PROCEDURE — 1123F ACP DISCUSS/DSCN MKR DOCD: CPT | Performed by: INTERNAL MEDICINE

## 2024-08-20 RX ORDER — DOXEPIN HYDROCHLORIDE 10 MG/1
10 CAPSULE ORAL
Qty: 180 CAPSULE | Refills: 1 | Status: SHIPPED | OUTPATIENT
Start: 2024-08-20

## 2024-08-20 ASSESSMENT — ENCOUNTER SYMPTOMS
WHEEZING: 1
COUGH: 1

## 2024-08-21 ENCOUNTER — TELEPHONE (OUTPATIENT)
Dept: INTERNAL MEDICINE CLINIC | Facility: CLINIC | Age: 74
End: 2024-08-21

## 2024-08-21 NOTE — TELEPHONE ENCOUNTER
Pt is needing some thing for his Bronchitis. Says that Dona has treated him for this before. I informed him that he may need an appt for this.     Called pt and he states that he is having shortness of coughing a lot due to COPD. Pt says that it has been short of breath for the last couple weeks, scheduled pt for this Friday 8/23 at 10am with Dona. Okayed by Karen

## 2024-08-21 NOTE — TELEPHONE ENCOUNTER
Pt is needing some thing for his Bronchitis. Says that Dona has treated him for this before. I informed him that he may need an appt for this.

## 2024-08-22 ENCOUNTER — OFFICE VISIT (OUTPATIENT)
Dept: INTERNAL MEDICINE CLINIC | Facility: CLINIC | Age: 74
End: 2024-08-22
Payer: MEDICARE

## 2024-08-22 VITALS
OXYGEN SATURATION: 95 % | HEART RATE: 105 BPM | SYSTOLIC BLOOD PRESSURE: 136 MMHG | TEMPERATURE: 98 F | HEIGHT: 66 IN | WEIGHT: 183 LBS | BODY MASS INDEX: 29.41 KG/M2 | DIASTOLIC BLOOD PRESSURE: 72 MMHG

## 2024-08-22 DIAGNOSIS — J47.1 BRONCHIECTASIS WITH ACUTE EXACERBATION (HCC): Primary | ICD-10-CM

## 2024-08-22 PROCEDURE — 3078F DIAST BP <80 MM HG: CPT | Performed by: NURSE PRACTITIONER

## 2024-08-22 PROCEDURE — 1123F ACP DISCUSS/DSCN MKR DOCD: CPT | Performed by: NURSE PRACTITIONER

## 2024-08-22 PROCEDURE — 1036F TOBACCO NON-USER: CPT | Performed by: NURSE PRACTITIONER

## 2024-08-22 PROCEDURE — G8427 DOCREV CUR MEDS BY ELIG CLIN: HCPCS | Performed by: NURSE PRACTITIONER

## 2024-08-22 PROCEDURE — 3075F SYST BP GE 130 - 139MM HG: CPT | Performed by: NURSE PRACTITIONER

## 2024-08-22 PROCEDURE — 3017F COLORECTAL CA SCREEN DOC REV: CPT | Performed by: NURSE PRACTITIONER

## 2024-08-22 PROCEDURE — G8417 CALC BMI ABV UP PARAM F/U: HCPCS | Performed by: NURSE PRACTITIONER

## 2024-08-22 PROCEDURE — 99214 OFFICE O/P EST MOD 30 MIN: CPT | Performed by: NURSE PRACTITIONER

## 2024-08-22 RX ORDER — PREDNISONE 10 MG/1
TABLET ORAL
Qty: 18 TABLET | Refills: 0 | Status: SHIPPED | OUTPATIENT
Start: 2024-08-22 | End: 2024-09-01

## 2024-08-22 RX ORDER — LEVOFLOXACIN 750 MG/1
750 TABLET, FILM COATED ORAL DAILY
Qty: 7 TABLET | Refills: 0 | Status: SHIPPED | OUTPATIENT
Start: 2024-08-22 | End: 2024-08-29

## 2024-08-22 ASSESSMENT — PATIENT HEALTH QUESTIONNAIRE - PHQ9
8. MOVING OR SPEAKING SO SLOWLY THAT OTHER PEOPLE COULD HAVE NOTICED. OR THE OPPOSITE, BEING SO FIGETY OR RESTLESS THAT YOU HAVE BEEN MOVING AROUND A LOT MORE THAN USUAL: NEARLY EVERY DAY
9. THOUGHTS THAT YOU WOULD BE BETTER OFF DEAD, OR OF HURTING YOURSELF: NOT AT ALL
6. FEELING BAD ABOUT YOURSELF - OR THAT YOU ARE A FAILURE OR HAVE LET YOURSELF OR YOUR FAMILY DOWN: NOT AT ALL
SUM OF ALL RESPONSES TO PHQ QUESTIONS 1-9: 9
3. TROUBLE FALLING OR STAYING ASLEEP: NEARLY EVERY DAY
1. LITTLE INTEREST OR PLEASURE IN DOING THINGS: NOT AT ALL
SUM OF ALL RESPONSES TO PHQ QUESTIONS 1-9: 9
7. TROUBLE CONCENTRATING ON THINGS, SUCH AS READING THE NEWSPAPER OR WATCHING TELEVISION: NOT AT ALL
10. IF YOU CHECKED OFF ANY PROBLEMS, HOW DIFFICULT HAVE THESE PROBLEMS MADE IT FOR YOU TO DO YOUR WORK, TAKE CARE OF THINGS AT HOME, OR GET ALONG WITH OTHER PEOPLE: SOMEWHAT DIFFICULT
SUM OF ALL RESPONSES TO PHQ QUESTIONS 1-9: 9
2. FEELING DOWN, DEPRESSED OR HOPELESS: NOT AT ALL
5. POOR APPETITE OR OVEREATING: NOT AT ALL
4. FEELING TIRED OR HAVING LITTLE ENERGY: NEARLY EVERY DAY
SUM OF ALL RESPONSES TO PHQ QUESTIONS 1-9: 9
SUM OF ALL RESPONSES TO PHQ9 QUESTIONS 1 & 2: 0

## 2024-08-22 NOTE — PROGRESS NOTES
PROGRESS NOTE    SUBJECTIVE:   Tramaine Harding is a 74 y.o. male seen for a follow up visit for   Chief Complaint   Patient presents with    Fatigue    Cough    Shortness of Breath     For a few weeks     HPI    Weak, dizzy, short of breath, cough d ry cough.  Can walk about 40 feet and then becomes short of breath.    Also having sneezing.      [facility]     Past Medical History:   Diagnosis Date    Abnormal cardiovascular function study 1/26/2016    Adenoma of left adrenal gland     Arthritis     general    BPH (benign prostatic hyperplasia)     Chest discomfort 1/26/2016    Chronic kidney disease     Chronic low back pain     Chronic pain     back, hip    COPD (chronic obstructive pulmonary disease) (HCC)     mild per pt; no rescue inhaler; denies SOB with 1 flight of steps    Coronary artery disease     had CABG 9/14/15 s/p failed stress test    ED (erectile dysfunction)     GERD (gastroesophageal reflux disease)     controlled with med    History of basal cell cancer     Hypertension     controlled with med    Left atrial dilation 10/2016    LVH (left ventricular hypertrophy) 02/2018    Mitral valve regurgitation 10/2016    1+    Mixed hyperlipidemia     Murmur, cardiac     soft 1/6 MR murmur per cardiologist note 11-10-17 : per echo 10-24-16 \"mild mitral regurg\"    Nausea & vomiting     responds to IV antiemetic    Palpitations     Pleural effusion exudative 9/30/15    t-cent on left    Prostatic hypertrophy, benign 1/26/2016    Sleep apnea     pt reports mild- no c-pap    Status post right hip replacement 1/12/2018    Tricuspid valvular regurgitation         Past Surgical History:   Procedure Laterality Date    COLONOSCOPY  08/2011    tubular adenoma    HEENT Bilateral 8/2003    Lasik    LUMBAR FUSION  2013    Laminectomy & fusion L4/L5    MOHS SURGERY Left 2008    ORTHOPEDIC SURGERY  06/13/2017    L3, L4, L5 and S1 fusion with instrumentation, Dr Miles    OTHER SURGICAL HISTORY      thoracentesis     plan.      Dona Washburn APRN - NP    Dictated using voice recognition software. Proofread, but unrecognized voice recognition errors may exist.

## 2024-08-23 RX ORDER — AZITHROMYCIN 250 MG/1
250 TABLET, FILM COATED ORAL
Qty: 12 TABLET | Refills: 11 | Status: SHIPPED | OUTPATIENT
Start: 2024-08-23

## 2024-08-23 NOTE — TELEPHONE ENCOUNTER
Contacted patient w/ MD recommendations, he was seen by PCP yesterday & prescribed levofloxin & steroids, advised TIW azithromycin Rx will [facility] and he can start this after antibiotic course from PCP.

## 2024-08-23 NOTE — TELEPHONE ENCOUNTER
Please send in the azithromycin refill for him.  Before he starts that though, have him take 7 days of levaquin.   Make sure he took a COVID test along the way at some point.  Maggi Barros MD

## 2024-08-27 ASSESSMENT — ENCOUNTER SYMPTOMS
WHEEZING: 0
SORE THROAT: 1
COUGH: 1
SHORTNESS OF BREATH: 1

## 2024-09-06 ENCOUNTER — TELEPHONE (OUTPATIENT)
Dept: PULMONOLOGY | Age: 74
End: 2024-09-06

## 2024-09-06 DIAGNOSIS — R05.9 COUGH, UNSPECIFIED TYPE: ICD-10-CM

## 2024-09-06 DIAGNOSIS — R06.02 SOB (SHORTNESS OF BREATH): Primary | ICD-10-CM

## 2024-09-06 NOTE — TELEPHONE ENCOUNTER
TRIAGE CALL      Complaint: very sob/ drainage   Cough: some  Productive:  no  Bloody Sputum:  no  Increased SOB/Wheezing:  yes both  Duration: several wks   Fever/Chills: no  OTC Meds tried: musinex    Says he has been on prednisone . But finished those last Monday . Says he is just very sob . Says he afraid he may have fluid in his lungs

## 2024-09-06 NOTE — TELEPHONE ENCOUNTER
Last seen: 7/18/24  Hx: CERDA, emphysema, alpha1 deficient, bronchiectasis        See sick call 8/19.  Patient reporting continued sob & a lot of drainage, feels like this has happened before and antibiotic course w/ steroids cleared him up, but this time did not; worked in for office exam 9/9 w/ CXR prior.

## 2024-09-09 ENCOUNTER — OFFICE VISIT (OUTPATIENT)
Dept: PULMONOLOGY | Age: 74
End: 2024-09-09
Payer: MEDICARE

## 2024-09-09 ENCOUNTER — HOSPITAL ENCOUNTER (OUTPATIENT)
Dept: GENERAL RADIOLOGY | Age: 74
Discharge: HOME | End: 2024-09-12
Payer: MEDICARE

## 2024-09-09 VITALS
DIASTOLIC BLOOD PRESSURE: 66 MMHG | HEART RATE: 96 BPM | HEIGHT: 66 IN | TEMPERATURE: 97.2 F | SYSTOLIC BLOOD PRESSURE: 128 MMHG | OXYGEN SATURATION: 99 % | BODY MASS INDEX: 29.97 KG/M2 | WEIGHT: 186.5 LBS | RESPIRATION RATE: 14 BRPM

## 2024-09-09 DIAGNOSIS — R06.02 SOB (SHORTNESS OF BREATH): ICD-10-CM

## 2024-09-09 DIAGNOSIS — R19.8 ABDOMINAL TIGHTNESS: ICD-10-CM

## 2024-09-09 DIAGNOSIS — R05.9 COUGH, UNSPECIFIED TYPE: ICD-10-CM

## 2024-09-09 DIAGNOSIS — R09.02 EXERCISE HYPOXEMIA: ICD-10-CM

## 2024-09-09 DIAGNOSIS — J47.9 BRONCHIECTASIS WITHOUT COMPLICATION (HCC): ICD-10-CM

## 2024-09-09 DIAGNOSIS — R06.02 SOB (SHORTNESS OF BREATH): Primary | ICD-10-CM

## 2024-09-09 DIAGNOSIS — E88.01 ALPHA-1-ANTITRYPSIN DEFICIENCY (HCC): ICD-10-CM

## 2024-09-09 LAB
ALBUMIN SERPL-MCNC: 3.8 G/DL (ref 3.2–4.6)
ALBUMIN/GLOB SERPL: 1.2 (ref 1–1.9)
ALP SERPL-CCNC: 103 U/L (ref 40–129)
ALT SERPL-CCNC: 42 U/L (ref 12–65)
ANION GAP SERPL CALC-SCNC: 12 MMOL/L (ref 9–18)
AST SERPL-CCNC: 32 U/L (ref 15–37)
BILIRUB SERPL-MCNC: 0.6 MG/DL (ref 0–1.2)
BUN SERPL-MCNC: 12 MG/DL (ref 8–23)
CALCIUM SERPL-MCNC: 9.6 MG/DL (ref 8.8–10.2)
CHLORIDE SERPL-SCNC: 103 MMOL/L (ref 98–107)
CO2 SERPL-SCNC: 24 MMOL/L (ref 20–28)
CREAT SERPL-MCNC: 0.89 MG/DL (ref 0.8–1.3)
GLOBULIN SER CALC-MCNC: 3.1 G/DL (ref 2.3–3.5)
GLUCOSE SERPL-MCNC: 99 MG/DL (ref 70–99)
NT PRO BNP: 90 PG/ML (ref 0–125)
POTASSIUM SERPL-SCNC: 4.1 MMOL/L (ref 3.5–5.1)
PROT SERPL-MCNC: 7 G/DL (ref 6.3–8.2)
SODIUM SERPL-SCNC: 138 MMOL/L (ref 136–145)

## 2024-09-09 PROCEDURE — 71046 X-RAY EXAM CHEST 2 VIEWS: CPT

## 2024-09-09 PROCEDURE — 1036F TOBACCO NON-USER: CPT | Performed by: STUDENT IN AN ORGANIZED HEALTH CARE EDUCATION/TRAINING PROGRAM

## 2024-09-09 PROCEDURE — 1123F ACP DISCUSS/DSCN MKR DOCD: CPT | Performed by: STUDENT IN AN ORGANIZED HEALTH CARE EDUCATION/TRAINING PROGRAM

## 2024-09-09 PROCEDURE — G8417 CALC BMI ABV UP PARAM F/U: HCPCS | Performed by: STUDENT IN AN ORGANIZED HEALTH CARE EDUCATION/TRAINING PROGRAM

## 2024-09-09 PROCEDURE — 3074F SYST BP LT 130 MM HG: CPT | Performed by: STUDENT IN AN ORGANIZED HEALTH CARE EDUCATION/TRAINING PROGRAM

## 2024-09-09 PROCEDURE — G8427 DOCREV CUR MEDS BY ELIG CLIN: HCPCS | Performed by: STUDENT IN AN ORGANIZED HEALTH CARE EDUCATION/TRAINING PROGRAM

## 2024-09-09 PROCEDURE — 3078F DIAST BP <80 MM HG: CPT | Performed by: STUDENT IN AN ORGANIZED HEALTH CARE EDUCATION/TRAINING PROGRAM

## 2024-09-09 PROCEDURE — 99214 OFFICE O/P EST MOD 30 MIN: CPT | Performed by: STUDENT IN AN ORGANIZED HEALTH CARE EDUCATION/TRAINING PROGRAM

## 2024-09-09 PROCEDURE — 3017F COLORECTAL CA SCREEN DOC REV: CPT | Performed by: STUDENT IN AN ORGANIZED HEALTH CARE EDUCATION/TRAINING PROGRAM

## 2024-09-09 RX ORDER — FUROSEMIDE 40 MG
40 TABLET ORAL DAILY
Qty: 7 TABLET | Refills: 0 | Status: SHIPPED | OUTPATIENT
Start: 2024-09-09

## 2024-09-10 ENCOUNTER — TELEPHONE (OUTPATIENT)
Dept: PULMONOLOGY | Age: 74
End: 2024-09-10

## 2024-09-10 DIAGNOSIS — R19.8 ABDOMINAL TIGHTNESS: Primary | ICD-10-CM

## 2024-09-10 DIAGNOSIS — R06.02 SOB (SHORTNESS OF BREATH): ICD-10-CM

## 2024-09-17 ENCOUNTER — TELEMEDICINE (OUTPATIENT)
Dept: INTERNAL MEDICINE CLINIC | Facility: CLINIC | Age: 74
End: 2024-09-17
Payer: MEDICARE

## 2024-09-17 DIAGNOSIS — E88.01 ALPHA-1-ANTITRYPSIN DEFICIENCY (HCC): Chronic | ICD-10-CM

## 2024-09-17 DIAGNOSIS — U07.1 COVID-19: Primary | ICD-10-CM

## 2024-09-17 DIAGNOSIS — J44.9 CHRONIC OBSTRUCTIVE PULMONARY DISEASE, UNSPECIFIED COPD TYPE (HCC): Chronic | ICD-10-CM

## 2024-09-17 PROCEDURE — 99213 OFFICE O/P EST LOW 20 MIN: CPT | Performed by: NURSE PRACTITIONER

## 2024-09-17 PROCEDURE — 1123F ACP DISCUSS/DSCN MKR DOCD: CPT | Performed by: NURSE PRACTITIONER

## 2024-09-17 PROCEDURE — 3017F COLORECTAL CA SCREEN DOC REV: CPT | Performed by: NURSE PRACTITIONER

## 2024-09-17 PROCEDURE — G8427 DOCREV CUR MEDS BY ELIG CLIN: HCPCS | Performed by: NURSE PRACTITIONER

## 2024-09-17 ASSESSMENT — ENCOUNTER SYMPTOMS
DIARRHEA: 0
COUGH: 1
WHEEZING: 1
SHORTNESS OF BREATH: 0

## 2024-09-24 ENCOUNTER — HOSPITAL ENCOUNTER (OUTPATIENT)
Dept: ULTRASOUND IMAGING | Age: 74
Discharge: HOME | End: 2024-09-27
Payer: MEDICARE

## 2024-09-24 DIAGNOSIS — R06.02 SOB (SHORTNESS OF BREATH): ICD-10-CM

## 2024-09-24 DIAGNOSIS — R19.8 ABDOMINAL TIGHTNESS: ICD-10-CM

## 2024-09-24 PROCEDURE — 76700 US EXAM ABDOM COMPLETE: CPT

## 2024-10-15 ENCOUNTER — TELEPHONE (OUTPATIENT)
Dept: PULMONOLOGY | Age: 74
End: 2024-10-15

## 2024-10-15 NOTE — TELEPHONE ENCOUNTER
----- Message from MAR Olguin NP sent at 10/8/2024  2:11 PM EDT -----  Will you please let Mr. Harding know that his abdominal ultrasound was unremarkable with no fluid accumulation. Please let me know if he is not feeling better.   Thank you,   MAR Manjarrez NP

## 2024-10-15 NOTE — TELEPHONE ENCOUNTER
Contacted patient, I let him know as per Ms.Amy Sebastian his abdominal ultrasound was unremarkable with no fluid accumulation. He voices understanding abt the result. Patient stated, he still feel bloated and SOB. He use O2 prn on exertion. Theresa DONIS

## 2024-10-29 LAB
ALBUMIN SERPL-MCNC: 3.9 G/DL (ref 3.8–4.8)
ALP SERPL-CCNC: 124 IU/L (ref 44–121)
ALT SERPL-CCNC: 25 IU/L (ref 0–44)
AST SERPL-CCNC: 24 IU/L (ref 0–40)
BASOPHILS # BLD AUTO: 0.1 X10E3/UL (ref 0–0.2)
BASOPHILS NFR BLD AUTO: 1 %
BILIRUB SERPL-MCNC: 0.3 MG/DL (ref 0–1.2)
BUN SERPL-MCNC: 12 MG/DL (ref 8–27)
BUN/CREAT SERPL: 13 (ref 10–24)
CALCIUM SERPL-MCNC: 9.5 MG/DL (ref 8.6–10.2)
CHLORIDE SERPL-SCNC: 105 MMOL/L (ref 96–106)
CHOLEST SERPL-MCNC: 227 MG/DL (ref 100–199)
CO2 SERPL-SCNC: 21 MMOL/L (ref 20–29)
CREAT SERPL-MCNC: 0.95 MG/DL (ref 0.76–1.27)
EGFRCR SERPLBLD CKD-EPI 2021: 85 ML/MIN/1.73
EOSINOPHIL # BLD AUTO: 0.4 X10E3/UL (ref 0–0.4)
EOSINOPHIL NFR BLD AUTO: 6 %
ERYTHROCYTE [DISTWIDTH] IN BLOOD BY AUTOMATED COUNT: 12.5 % (ref 11.6–15.4)
GLOBULIN SER CALC-MCNC: 2.7 G/DL (ref 1.5–4.5)
GLUCOSE SERPL-MCNC: 102 MG/DL (ref 70–99)
HCT VFR BLD AUTO: 46.9 % (ref 37.5–51)
HDLC SERPL-MCNC: 60 MG/DL
HGB BLD-MCNC: 15.4 G/DL (ref 13–17.7)
IMM GRANULOCYTES # BLD AUTO: 0 X10E3/UL (ref 0–0.1)
IMM GRANULOCYTES NFR BLD AUTO: 0 %
IMP & REVIEW OF LAB RESULTS: NORMAL
LDLC SERPL CALC-MCNC: 123 MG/DL (ref 0–99)
LYMPHOCYTES # BLD AUTO: 1.6 X10E3/UL (ref 0.7–3.1)
LYMPHOCYTES NFR BLD AUTO: 24 %
MCH RBC QN AUTO: 33 PG (ref 26.6–33)
MCHC RBC AUTO-ENTMCNC: 32.8 G/DL (ref 31.5–35.7)
MCV RBC AUTO: 100 FL (ref 79–97)
MONOCYTES # BLD AUTO: 0.7 X10E3/UL (ref 0.1–0.9)
MONOCYTES NFR BLD AUTO: 11 %
NEUTROPHILS # BLD AUTO: 4 X10E3/UL (ref 1.4–7)
NEUTROPHILS NFR BLD AUTO: 58 %
PLATELET # BLD AUTO: 255 X10E3/UL (ref 150–450)
POTASSIUM SERPL-SCNC: 4.7 MMOL/L (ref 3.5–5.2)
PROT SERPL-MCNC: 6.6 G/DL (ref 6–8.5)
RBC # BLD AUTO: 4.67 X10E6/UL (ref 4.14–5.8)
SODIUM SERPL-SCNC: 140 MMOL/L (ref 134–144)
TRIGL SERPL-MCNC: 252 MG/DL (ref 0–149)
VLDLC SERPL CALC-MCNC: 44 MG/DL (ref 5–40)
WBC # BLD AUTO: 6.7 X10E3/UL (ref 3.4–10.8)

## 2024-10-30 DIAGNOSIS — Z95.1 S/P CABG (CORONARY ARTERY BYPASS GRAFT): ICD-10-CM

## 2024-10-30 DIAGNOSIS — E78.2 MIXED HYPERLIPIDEMIA: ICD-10-CM

## 2024-10-30 DIAGNOSIS — I10 ESSENTIAL HYPERTENSION: ICD-10-CM

## 2024-12-09 ENCOUNTER — TELEPHONE (OUTPATIENT)
Dept: PULMONOLOGY | Age: 74
End: 2024-12-09

## 2024-12-09 NOTE — TELEPHONE ENCOUNTER
Patient is having problems with hernia and is asking to be referred to surgeon in [place].  His PCP has moved and he can not see another one until January

## 2024-12-10 ENCOUNTER — OFFICE VISIT (OUTPATIENT)
Dept: INTERNAL MEDICINE CLINIC | Facility: CLINIC | Age: 74
End: 2024-12-10
Payer: MEDICARE

## 2024-12-10 VITALS
BODY MASS INDEX: 31.44 KG/M2 | OXYGEN SATURATION: 94 % | DIASTOLIC BLOOD PRESSURE: 78 MMHG | WEIGHT: 194.8 LBS | SYSTOLIC BLOOD PRESSURE: 134 MMHG | HEART RATE: 92 BPM

## 2024-12-10 DIAGNOSIS — Z95.1 S/P CABG (CORONARY ARTERY BYPASS GRAFT): ICD-10-CM

## 2024-12-10 DIAGNOSIS — K42.9 UMBILICAL HERNIA WITHOUT OBSTRUCTION AND WITHOUT GANGRENE: Primary | ICD-10-CM

## 2024-12-10 PROBLEM — F33.9 MAJOR DEPRESSIVE DISORDER, RECURRENT, UNSPECIFIED (HCC): Status: RESOLVED | Noted: 2024-03-12 | Resolved: 2024-12-10

## 2024-12-10 PROBLEM — G47.34 NOCTURNAL HYPOXEMIA: Status: RESOLVED | Noted: 2023-02-13 | Resolved: 2024-12-10

## 2024-12-10 PROBLEM — Z46.1 ENCOUNTER FOR FITTING AND ADJUSTMENT OF HEARING AID: Status: ACTIVE | Noted: 2024-12-10

## 2024-12-10 PROBLEM — J47.9 BRONCHIECTASIS, UNCOMPLICATED (HCC): Status: ACTIVE | Noted: 2024-12-10

## 2024-12-10 PROCEDURE — 1123F ACP DISCUSS/DSCN MKR DOCD: CPT | Performed by: INTERNAL MEDICINE

## 2024-12-10 PROCEDURE — G8427 DOCREV CUR MEDS BY ELIG CLIN: HCPCS | Performed by: INTERNAL MEDICINE

## 2024-12-10 PROCEDURE — 99213 OFFICE O/P EST LOW 20 MIN: CPT | Performed by: INTERNAL MEDICINE

## 2024-12-10 PROCEDURE — 1036F TOBACCO NON-USER: CPT | Performed by: INTERNAL MEDICINE

## 2024-12-10 PROCEDURE — G8417 CALC BMI ABV UP PARAM F/U: HCPCS | Performed by: INTERNAL MEDICINE

## 2024-12-10 PROCEDURE — G8482 FLU IMMUNIZE ORDER/ADMIN: HCPCS | Performed by: INTERNAL MEDICINE

## 2024-12-10 PROCEDURE — 3075F SYST BP GE 130 - 139MM HG: CPT | Performed by: INTERNAL MEDICINE

## 2024-12-10 PROCEDURE — 3017F COLORECTAL CA SCREEN DOC REV: CPT | Performed by: INTERNAL MEDICINE

## 2024-12-10 PROCEDURE — 1160F RVW MEDS BY RX/DR IN RCRD: CPT | Performed by: INTERNAL MEDICINE

## 2024-12-10 PROCEDURE — 1159F MED LIST DOCD IN RCRD: CPT | Performed by: INTERNAL MEDICINE

## 2024-12-10 PROCEDURE — 3078F DIAST BP <80 MM HG: CPT | Performed by: INTERNAL MEDICINE

## 2024-12-10 RX ORDER — EZETIMIBE 10 MG/1
10 TABLET ORAL DAILY
COMMUNITY
Start: 2024-11-06

## 2024-12-10 RX ORDER — GABAPENTIN 300 MG/1
300 CAPSULE ORAL
COMMUNITY
Start: 2024-11-06

## 2024-12-10 ASSESSMENT — ENCOUNTER SYMPTOMS: ABDOMINAL PAIN: 1

## 2024-12-10 NOTE — PROGRESS NOTES
He had had an abdominal hernia in the past and now noted worsening pain and protrusion.     Abdominal Pain  This is a chronic problem. The current episode started more than 1 month ago. The onset quality is gradual. The problem occurs intermittently. The problem has been gradually worsening.       Past Medical History, Past Surgical History, Family history, Social History, and Medications were all reviewed with the patient today and updated as necessary.       Current Outpatient Medications   Medication Sig Dispense Refill    ezetimibe (ZETIA) 10 MG tablet Take 1 tablet by mouth daily      gabapentin (NEURONTIN) 300 MG capsule Take 1 capsule by mouth nightly as needed.      amLODIPine (NORVASC) 5 MG tablet Take 1 tablet by mouth daily 100 tablet 3    sodium chloride, Inhalant, 3 % nebulizer solution Take 4 mLs by nebulization in the morning and at bedtime 240 mL 11    albuterol (PROVENTIL) (2.5 MG/3ML) 0.083% nebulizer solution Take 3 mLs by nebulization every 6 hours as needed for Wheezing 120 each 3    albuterol sulfate HFA (PROVENTIL;VENTOLIN;PROAIR) 108 (90 Base) MCG/ACT inhaler Inhale 2 puffs into the lungs every 4 hours as needed for Shortness of Breath 3 each 3    Budeson-Glycopyrrol-Formoterol (BREZTRI AEROSPHERE) 160-9-4.8 MCG/ACT AERO Inhale 2 puffs into the lungs in the morning and at bedtime 3 each 3    OXYGEN 2lpm qhs      aspirin 81 MG EC tablet Take by mouth daily      cyanocobalamin 1000 MCG tablet Take 1 tablet by mouth daily      fluticasone (FLONASE) 50 MCG/ACT nasal spray 2 sprays by Nasal route daily      ibuprofen (ADVIL;MOTRIN) 800 MG tablet Take 1 tablet by mouth every 6 hours as needed      losartan (COZAAR) 100 MG tablet Take 1 tablet by mouth daily      metoprolol succinate (TOPROL XL) 25 MG extended release tablet Take 1 tablet by mouth daily      nitroGLYCERIN (NITROSTAT) 0.3 MG SL tablet Place 1 tablet under the tongue      omeprazole (PRILOSEC) 40 MG delayed release capsule Take 1

## 2024-12-17 NOTE — TELEPHONE ENCOUNTER
Patient has already been referred to a surgeon and will keep appt with Dr. Barros in January and voices understanding.

## 2025-01-04 NOTE — PROGRESS NOTES
Dr. Maggi Barros MD  [address]  [place] [address]  [phone]    Patient Name:  Tramaine Harding  YOB: 1950  PCP:  Dona Washburn NP  Office Visit: 1/8/2025    ASSESSMENT AND PLAN:  (Medical Decision Making)    1. Dyspnea on exertion  Plan to start by increasing albuterol nebulizer use and adding a spacer with the Breztri.  If this doesn't improve dyspnea, we can switch to all nebulized long acting medications.  If still has severe dyspnea, could consider fasenra since has significant eosinophilia.    2. Stage 2 moderate COPD by GOLD classification ([facility])  Continue Breztri, albuterol, tiw azithromycin.  No symptoms of hypercarbia but has hypoxia and very low DLCO.    3. Bronchiectasis without complication (HCC)  No recent exacerbations and immunizations are up to date.    4. Exercise hypoxemia  Requested POC from [facility] and will speak with them about this today.      5. Alpha-1-antitrypsin deficiency (HCC)  Maintained on augmentation therapy.      No orders of the defined types were placed in this encounter.    No orders of the defined types were placed in this encounter.    Follow-up and Dispositions    Return for next available with Papo or Margie.       Maggi Barros MD    Total time for encounter on day of encounter was 41 minutes.  This time includes chart prep, review of tests/procedures, review of other provider's notes, documentation and counseling patient regarding disease process and medications.     _____________________________________________________________________________________________________________________    Reason for Visit:  Bronchiectasis, Shortness of Breath, and Alpha-1 Antitrypsin      History of Present Illness:     Tramaine Harding Is a 74 y.o. 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

## 2025-01-08 ENCOUNTER — NURSE ONLY (OUTPATIENT)
Dept: PULMONOLOGY | Age: 75
End: 2025-01-08
Payer: MEDICARE

## 2025-01-08 ENCOUNTER — OFFICE VISIT (OUTPATIENT)
Dept: PULMONOLOGY | Age: 75
End: 2025-01-08

## 2025-01-08 VITALS
HEART RATE: 79 BPM | SYSTOLIC BLOOD PRESSURE: 122 MMHG | HEIGHT: 66 IN | BODY MASS INDEX: 31.34 KG/M2 | DIASTOLIC BLOOD PRESSURE: 68 MMHG | WEIGHT: 195 LBS | OXYGEN SATURATION: 95 % | TEMPERATURE: 97.3 F | RESPIRATION RATE: 20 BRPM

## 2025-01-08 DIAGNOSIS — J44.9 STAGE 2 MODERATE COPD BY GOLD CLASSIFICATION (HCC): ICD-10-CM

## 2025-01-08 DIAGNOSIS — J47.9 BRONCHIECTASIS WITHOUT COMPLICATION (HCC): ICD-10-CM

## 2025-01-08 DIAGNOSIS — R06.09 DYSPNEA ON EXERTION: Primary | ICD-10-CM

## 2025-01-08 DIAGNOSIS — R09.02 EXERCISE HYPOXEMIA: ICD-10-CM

## 2025-01-08 DIAGNOSIS — E88.01 ALPHA-1-ANTITRYPSIN DEFICIENCY (HCC): ICD-10-CM

## 2025-01-08 DIAGNOSIS — J43.1 PANLOBULAR EMPHYSEMA (HCC): Primary | ICD-10-CM

## 2025-01-08 LAB
FEF25-27, POC: 0.94 L/S
FET, POC: NORMAL
FEV 1 , POC: 1.93 L
FEV1/FVC, POC: NORMAL
FVC, POC: NORMAL
LUNG AGE, POC: NORMAL
PEF, POC: 5.2 L/S

## 2025-01-08 PROCEDURE — 94010 BREATHING CAPACITY TEST: CPT | Performed by: INTERNAL MEDICINE

## 2025-01-08 PROCEDURE — 94726 PLETHYSMOGRAPHY LUNG VOLUMES: CPT | Performed by: INTERNAL MEDICINE

## 2025-01-08 PROCEDURE — 94729 DIFFUSING CAPACITY: CPT | Performed by: INTERNAL MEDICINE

## 2025-01-08 RX ORDER — ALBUTEROL SULFATE 0.83 MG/ML
2.5 SOLUTION RESPIRATORY (INHALATION) EVERY 6 HOURS PRN
Qty: 360 ML | Refills: 3 | Status: SHIPPED | OUTPATIENT
Start: 2025-01-08

## 2025-01-08 RX ORDER — ALBUTEROL SULFATE 90 UG/1
2 INHALANT RESPIRATORY (INHALATION) EVERY 4 HOURS PRN
Qty: 3 EACH | Refills: 3 | Status: SHIPPED | OUTPATIENT
Start: 2025-01-08

## 2025-01-08 RX ORDER — BUDESONIDE, GLYCOPYRROLATE, AND FORMOTEROL FUMARATE 160; 9; 4.8 UG/1; UG/1; UG/1
2 AEROSOL, METERED RESPIRATORY (INHALATION) 2 TIMES DAILY
Qty: 3 EACH | Refills: 3 | Status: SHIPPED | OUTPATIENT
Start: 2025-01-08

## 2025-01-08 RX ORDER — PREDNISONE 10 MG/1
TABLET ORAL
Qty: 30 TABLET | Refills: 0 | Status: SHIPPED | OUTPATIENT
Start: 2025-01-08

## 2025-01-08 NOTE — PATIENT INSTRUCTIONS
To help with your shortness of breath and wheezing, I recommend the following as \"Plan A\":  Use albuterol in the nebulizer three times a day for the next 1-2 weeks.  Air trapping can cause a lot of the symptoms you describe and albuterol nebulizer help with this a lot.  Use a spacer with your Breztri and take it after you have done an albuterol nebulizer to try to get the medicine deeper in your lungs.        2.  We will coordinate with [facility] to make sure you are assessed for a POC device soon.    3.  If \"Plan A\" doesn't work, then I recommend we proceed to \"Plan B\":   A.  Take a course of prednisone as prescribed.  B.  Contact Marlyn Torres so we can change your Breztri to long-acting nebulizer medications:   Brovana nebulizer twice a day  Budesonide nebulizer twice a day   Duonebs 3-4 times a day as needed.    4.  If \"Plan B\" doesn't sufficiently help your shortness of breath, we could consider treatment for \"eosinophilic COPD or COPD/asthma overlap\" with a medication called fasenra. There is paperwork we are having you sign in the office today just in case you ultimately need it.

## 2025-01-11 SDOH — HEALTH STABILITY: PHYSICAL HEALTH
ON AVERAGE, HOW MANY DAYS PER WEEK DO YOU ENGAGE IN MODERATE TO STRENUOUS EXERCISE (LIKE A BRISK WALK)?: PATIENT DECLINED

## 2025-01-14 ENCOUNTER — OFFICE VISIT (OUTPATIENT)
Dept: INTERNAL MEDICINE CLINIC | Facility: CLINIC | Age: 75
End: 2025-01-14

## 2025-01-14 VITALS
HEART RATE: 88 BPM | BODY MASS INDEX: 31.72 KG/M2 | DIASTOLIC BLOOD PRESSURE: 81 MMHG | OXYGEN SATURATION: 94 % | SYSTOLIC BLOOD PRESSURE: 153 MMHG | TEMPERATURE: 98.2 F | HEIGHT: 66 IN | WEIGHT: 197.4 LBS

## 2025-01-14 DIAGNOSIS — I10 ESSENTIAL HYPERTENSION: Chronic | ICD-10-CM

## 2025-01-14 DIAGNOSIS — Z13.29 SCREENING FOR THYROID DISORDER: ICD-10-CM

## 2025-01-14 DIAGNOSIS — E78.2 MIXED HYPERLIPIDEMIA: Chronic | ICD-10-CM

## 2025-01-14 DIAGNOSIS — E55.9 VITAMIN D DEFICIENCY: ICD-10-CM

## 2025-01-14 DIAGNOSIS — R73.03 PREDIABETES: ICD-10-CM

## 2025-01-14 DIAGNOSIS — Z87.891 PERSONAL HISTORY OF TOBACCO USE, PRESENTING HAZARDS TO HEALTH: ICD-10-CM

## 2025-01-14 DIAGNOSIS — G47.33 OSA (OBSTRUCTIVE SLEEP APNEA): ICD-10-CM

## 2025-01-14 DIAGNOSIS — Z29.11 NEED FOR RSV IMMUNIZATION: ICD-10-CM

## 2025-01-14 DIAGNOSIS — Z13.6 SCREENING FOR AAA (ABDOMINAL AORTIC ANEURYSM): ICD-10-CM

## 2025-01-14 DIAGNOSIS — Z76.89 ENCOUNTER TO ESTABLISH CARE: Primary | ICD-10-CM

## 2025-01-14 SDOH — ECONOMIC STABILITY: FOOD INSECURITY: WITHIN THE PAST 12 MONTHS, THE FOOD YOU BOUGHT JUST DIDN'T LAST AND YOU DIDN'T HAVE MONEY TO GET MORE.: SOMETIMES TRUE

## 2025-01-14 SDOH — ECONOMIC STABILITY: FOOD INSECURITY: WITHIN THE PAST 12 MONTHS, YOU WORRIED THAT YOUR FOOD WOULD RUN OUT BEFORE YOU GOT MONEY TO BUY MORE.: NEVER TRUE

## 2025-01-14 ASSESSMENT — ENCOUNTER SYMPTOMS
COUGH: 0
BACK PAIN: 1
VOMITING: 0
ABDOMINAL PAIN: 0
SHORTNESS OF BREATH: 1
CHEST TIGHTNESS: 0
DIARRHEA: 0
NAUSEA: 0
ABDOMINAL DISTENTION: 0
CONSTIPATION: 0

## 2025-01-14 ASSESSMENT — PATIENT HEALTH QUESTIONNAIRE - PHQ9
SUM OF ALL RESPONSES TO PHQ QUESTIONS 1-9: 1
9. THOUGHTS THAT YOU WOULD BE BETTER OFF DEAD, OR OF HURTING YOURSELF: NOT AT ALL
1. LITTLE INTEREST OR PLEASURE IN DOING THINGS: NOT AT ALL
10. IF YOU CHECKED OFF ANY PROBLEMS, HOW DIFFICULT HAVE THESE PROBLEMS MADE IT FOR YOU TO DO YOUR WORK, TAKE CARE OF THINGS AT HOME, OR GET ALONG WITH OTHER PEOPLE: NOT DIFFICULT AT ALL
8. MOVING OR SPEAKING SO SLOWLY THAT OTHER PEOPLE COULD HAVE NOTICED. OR THE OPPOSITE, BEING SO FIGETY OR RESTLESS THAT YOU HAVE BEEN MOVING AROUND A LOT MORE THAN USUAL: NOT AT ALL
SUM OF ALL RESPONSES TO PHQ QUESTIONS 1-9: 1
3. TROUBLE FALLING OR STAYING ASLEEP: NOT AT ALL
4. FEELING TIRED OR HAVING LITTLE ENERGY: SEVERAL DAYS
7. TROUBLE CONCENTRATING ON THINGS, SUCH AS READING THE NEWSPAPER OR WATCHING TELEVISION: NOT AT ALL
6. FEELING BAD ABOUT YOURSELF - OR THAT YOU ARE A FAILURE OR HAVE LET YOURSELF OR YOUR FAMILY DOWN: NOT AT ALL
5. POOR APPETITE OR OVEREATING: NOT AT ALL
SUM OF ALL RESPONSES TO PHQ QUESTIONS 1-9: 1
SUM OF ALL RESPONSES TO PHQ9 QUESTIONS 1 & 2: 0
SUM OF ALL RESPONSES TO PHQ QUESTIONS 1-9: 1
2. FEELING DOWN, DEPRESSED OR HOPELESS: NOT AT ALL

## 2025-01-14 NOTE — PROGRESS NOTES
than 140 or diastolic greater than 90.  -     Comprehensive Metabolic Panel; Future    10. Screening for thyroid disorder  -     TSH reflex to FT4; Future    11. Mixed hyperlipidemia  Chronic, well controlled.  Labs reviewed [facility].  Copy in media.  Repeat lipid panel in 6 months.  -     TSH reflex to FT4; Future    Follow up:  Return in about 3 months (around 4/14/2025) for AWV and labs same day.     TTS: On this date 01/14/25  have spent 52 minutes reviewing previous notes, test results and face to face with the patient. Over 50% of today's office visit was spent in face to face time in counseling reviewing test results, importance of compliance, education about disease process, benefits and side-effects of medications and follow-up plan.     Signed By: MAR Hendrickson NP     January 14, 2025      Please note that this chart may contain entries made using voice recognition or similar or related smart technology; consequently, inadvertent word substitutions and/or grammatical errors may exist.

## 2025-01-16 DIAGNOSIS — J44.9 STAGE 2 MODERATE COPD BY GOLD CLASSIFICATION (HCC): Primary | ICD-10-CM

## 2025-01-16 NOTE — TELEPHONE ENCOUNTER
Patient is calling back to let Dr. Barros know that Plan A is not working.  Needs to go to Plan B and get prednisone

## 2025-01-17 DIAGNOSIS — J44.9 STAGE 2 MODERATE COPD BY GOLD CLASSIFICATION (HCC): ICD-10-CM

## 2025-01-17 RX ORDER — ARFORMOTEROL TARTRATE 15 UG/2ML
15 SOLUTION RESPIRATORY (INHALATION) 2 TIMES DAILY
Qty: 360 ML | Refills: 3 | Status: SHIPPED | OUTPATIENT
Start: 2025-01-17

## 2025-01-17 RX ORDER — BUDESONIDE 0.5 MG/2ML
1 INHALANT ORAL 2 TIMES DAILY
Qty: 360 ML | Refills: 3 | Status: SHIPPED | OUTPATIENT
Start: 2025-01-17

## 2025-01-17 RX ORDER — IPRATROPIUM BROMIDE AND ALBUTEROL SULFATE 2.5; .5 MG/3ML; MG/3ML
1 SOLUTION RESPIRATORY (INHALATION) EVERY 4 HOURS
Qty: 360 ML | Refills: 11 | Status: SHIPPED | OUTPATIENT
Start: 2025-01-17

## 2025-01-17 NOTE — TELEPHONE ENCOUNTER
Spoke to patient and he reports the Breztri along with Albuterol nebulizer treatments 3-4 times daily has not helped with the cough and shortness of breath and wants to go plan B. Wants the Brovana and pulmicort and [facility]oneb [facility]. He thinks his prednisone came already in the mail and will start on it as soon as he gets home and if was not the prednisone will call back. Informed him will send medications to Dr. Barros to sign and voices understanding.

## 2025-01-28 DIAGNOSIS — J44.9 STAGE 2 MODERATE COPD BY GOLD CLASSIFICATION (HCC): ICD-10-CM

## 2025-01-31 RX ORDER — IPRATROPIUM BROMIDE AND ALBUTEROL SULFATE 2.5; .5 MG/3ML; MG/3ML
SOLUTION RESPIRATORY (INHALATION)
Refills: 0 | OUTPATIENT
Start: 2025-01-31

## 2025-02-06 ENCOUNTER — HOSPITAL ENCOUNTER (OUTPATIENT)
Dept: ULTRASOUND IMAGING | Age: 75
Discharge: HOME | End: 2025-02-09
Payer: MEDICARE

## 2025-02-06 DIAGNOSIS — Z87.891 PERSONAL HISTORY OF TOBACCO USE, PRESENTING HAZARDS TO HEALTH: ICD-10-CM

## 2025-02-06 DIAGNOSIS — Z13.6 SCREENING FOR AAA (ABDOMINAL AORTIC ANEURYSM): ICD-10-CM

## 2025-02-06 PROCEDURE — 76706 US ABDL AORTA SCREEN AAA: CPT

## 2025-02-06 PROCEDURE — 76706 US ABDL AORTA SCREEN AAA: CPT | Performed by: RADIOLOGY

## 2025-02-23 NOTE — PROGRESS NOTES
[facility] CARDIOLOGY  [address] [place] [address], [address]  [place] [address]  PHONE: [phone]        NAME:  Tramaine Harding  : 1950  MRN: [id number]     PCP:  Macey Barraza APRN - NP      SUBJECTIVE:   Tramaine Harding is a 75 y.o. male seen for a follow up visit regarding the following:     Chief Complaint   Patient presents with    Coronary Artery Disease    Shortness of Breath       HPI:    Doing well since last visit without interval angina, CHF, palpitations, edema, presyncope or syncope.  Dyspnea has been slowly worsening since last visit, coughing and wheezing and seeing pulmonary regularly.   Staying active without any significant limitations other than dyspnea, using home O2 and trying to tolerate CPAP now at night but intolerant recently and not using.  Clinically euvolemic today but worried about his shortness of breath.        He has CAD with prior bypass, COPD, but also hypoxic at [place] [facility] recently and getting portable O2 sometime soon. Intolerant to multiple forms of CPAP and seeing pulmonary with COPD and alpha-1 antripsyn deficiency.  Encouraged him to try to find a CPAP mask he can tolerate.  ? Inspire candidate?  Defer to pulmonary.     He denies any chest discomfort symptoms whatsoever with his exertional dyspnea.  Nuclear stress test with benign features as noted below, no significant ischemia and normal function.       Nuclear stress test 2024:    Stress Combined Conclusion: Findings suggest a low risk of cardiac events.    Perfusion Comments: LV perfusion is probably normal.    Perfusion Defect: There is a moderate severity left ventricular stress perfusion defect that is large in size present in the inferior segment(s) that is fixed. This defect was visualized during the stress and rest phases of imaging. The defect appears to be an artifact caused by subdiaphragmatic activity.    Perfusion Conclusion: There is no evidence of transient ischemic dilation

## 2025-02-24 ENCOUNTER — OFFICE VISIT (OUTPATIENT)
Age: 75
End: 2025-02-24
Payer: MEDICARE

## 2025-02-24 VITALS
DIASTOLIC BLOOD PRESSURE: 70 MMHG | BODY MASS INDEX: 31.5 KG/M2 | HEART RATE: 72 BPM | WEIGHT: 196 LBS | SYSTOLIC BLOOD PRESSURE: 122 MMHG | HEIGHT: 66 IN

## 2025-02-24 DIAGNOSIS — I25.10 CORONARY ARTERY DISEASE INVOLVING NATIVE CORONARY ARTERY OF NATIVE HEART WITHOUT ANGINA PECTORIS: ICD-10-CM

## 2025-02-24 DIAGNOSIS — Z95.1 S/P CABG (CORONARY ARTERY BYPASS GRAFT): ICD-10-CM

## 2025-02-24 DIAGNOSIS — G47.33 OSA (OBSTRUCTIVE SLEEP APNEA): Chronic | ICD-10-CM

## 2025-02-24 DIAGNOSIS — I10 ESSENTIAL HYPERTENSION: Chronic | ICD-10-CM

## 2025-02-24 DIAGNOSIS — I51.7 LVH (LEFT VENTRICULAR HYPERTROPHY): ICD-10-CM

## 2025-02-24 DIAGNOSIS — E78.2 MIXED HYPERLIPIDEMIA: Chronic | ICD-10-CM

## 2025-02-24 DIAGNOSIS — I34.0 NONRHEUMATIC MITRAL VALVE REGURGITATION: Primary | ICD-10-CM

## 2025-02-24 PROCEDURE — 1160F RVW MEDS BY RX/DR IN RCRD: CPT | Performed by: INTERNAL MEDICINE

## 2025-02-24 PROCEDURE — 1159F MED LIST DOCD IN RCRD: CPT | Performed by: INTERNAL MEDICINE

## 2025-02-24 PROCEDURE — 1123F ACP DISCUSS/DSCN MKR DOCD: CPT | Performed by: INTERNAL MEDICINE

## 2025-02-24 PROCEDURE — G8417 CALC BMI ABV UP PARAM F/U: HCPCS | Performed by: INTERNAL MEDICINE

## 2025-02-24 PROCEDURE — 99214 OFFICE O/P EST MOD 30 MIN: CPT | Performed by: INTERNAL MEDICINE

## 2025-02-24 PROCEDURE — 3074F SYST BP LT 130 MM HG: CPT | Performed by: INTERNAL MEDICINE

## 2025-02-24 PROCEDURE — 93000 ELECTROCARDIOGRAM COMPLETE: CPT | Performed by: INTERNAL MEDICINE

## 2025-02-24 PROCEDURE — G8427 DOCREV CUR MEDS BY ELIG CLIN: HCPCS | Performed by: INTERNAL MEDICINE

## 2025-02-24 PROCEDURE — 1126F AMNT PAIN NOTED NONE PRSNT: CPT | Performed by: INTERNAL MEDICINE

## 2025-02-24 PROCEDURE — 3017F COLORECTAL CA SCREEN DOC REV: CPT | Performed by: INTERNAL MEDICINE

## 2025-02-24 PROCEDURE — 3078F DIAST BP <80 MM HG: CPT | Performed by: INTERNAL MEDICINE

## 2025-02-24 PROCEDURE — 1036F TOBACCO NON-USER: CPT | Performed by: INTERNAL MEDICINE

## 2025-02-24 RX ORDER — METOPROLOL SUCCINATE 25 MG/1
25 TABLET, EXTENDED RELEASE ORAL DAILY
Qty: 90 TABLET | Refills: 3 | Status: SHIPPED | OUTPATIENT
Start: 2025-02-24

## 2025-02-24 RX ORDER — LOSARTAN POTASSIUM 100 MG/1
100 TABLET ORAL DAILY
Qty: 90 TABLET | Refills: 3 | Status: SHIPPED | OUTPATIENT
Start: 2025-02-24

## 2025-02-24 RX ORDER — NITROGLYCERIN 0.3 MG/1
0.3 TABLET SUBLINGUAL EVERY 5 MIN PRN
Qty: 25 TABLET | Refills: 1 | Status: SHIPPED | OUTPATIENT
Start: 2025-02-24

## 2025-02-24 RX ORDER — ROSUVASTATIN CALCIUM 40 MG/1
40 TABLET, COATED ORAL DAILY
Qty: 90 TABLET | Refills: 3 | Status: SHIPPED | OUTPATIENT
Start: 2025-02-24

## 2025-03-27 ENCOUNTER — OFFICE VISIT (OUTPATIENT)
Dept: INTERNAL MEDICINE CLINIC | Facility: CLINIC | Age: 75
End: 2025-03-27

## 2025-03-27 VITALS
HEIGHT: 66 IN | OXYGEN SATURATION: 97 % | HEART RATE: 69 BPM | WEIGHT: 198.2 LBS | BODY MASS INDEX: 31.85 KG/M2 | DIASTOLIC BLOOD PRESSURE: 72 MMHG | SYSTOLIC BLOOD PRESSURE: 144 MMHG | TEMPERATURE: 97.7 F

## 2025-03-27 DIAGNOSIS — Z13.29 SCREENING FOR THYROID DISORDER: ICD-10-CM

## 2025-03-27 DIAGNOSIS — E55.9 VITAMIN D DEFICIENCY: ICD-10-CM

## 2025-03-27 DIAGNOSIS — R73.03 PREDIABETES: ICD-10-CM

## 2025-03-27 DIAGNOSIS — F33.1 MAJOR DEPRESSIVE DISORDER, RECURRENT, MODERATE (HCC): ICD-10-CM

## 2025-03-27 DIAGNOSIS — I10 ESSENTIAL HYPERTENSION: Chronic | ICD-10-CM

## 2025-03-27 DIAGNOSIS — E78.2 MIXED HYPERLIPIDEMIA: Chronic | ICD-10-CM

## 2025-03-27 DIAGNOSIS — M48.062 LUMBAR STENOSIS WITH NEUROGENIC CLAUDICATION: ICD-10-CM

## 2025-03-27 DIAGNOSIS — Z00.00 MEDICARE ANNUAL WELLNESS VISIT, SUBSEQUENT: Primary | ICD-10-CM

## 2025-03-27 LAB
25(OH)D3 SERPL-MCNC: 33 NG/ML (ref 30–100)
ALBUMIN SERPL-MCNC: 3.8 G/DL (ref 3.2–4.6)
ALBUMIN/GLOB SERPL: 1.3 (ref 1–1.9)
ALP SERPL-CCNC: 117 U/L (ref 40–129)
ALT SERPL-CCNC: 37 U/L (ref 8–55)
ANION GAP SERPL CALC-SCNC: 10 MMOL/L (ref 7–16)
AST SERPL-CCNC: 37 U/L (ref 15–37)
BASOPHILS # BLD: 0.06 K/UL (ref 0–0.2)
BASOPHILS NFR BLD: 0.8 % (ref 0–2)
BILIRUB SERPL-MCNC: 0.5 MG/DL (ref 0–1.2)
BUN SERPL-MCNC: 10 MG/DL (ref 8–23)
CALCIUM SERPL-MCNC: 9.8 MG/DL (ref 8.8–10.2)
CHLORIDE SERPL-SCNC: 106 MMOL/L (ref 98–107)
CO2 SERPL-SCNC: 25 MMOL/L (ref 20–29)
CREAT SERPL-MCNC: 0.94 MG/DL (ref 0.8–1.3)
DIFFERENTIAL METHOD BLD: NORMAL
EOSINOPHIL # BLD: 0.24 K/UL (ref 0–0.8)
EOSINOPHIL NFR BLD: 3.1 % (ref 0.5–7.8)
ERYTHROCYTE [DISTWIDTH] IN BLOOD BY AUTOMATED COUNT: 12.3 % (ref 11.9–14.6)
EST. AVERAGE GLUCOSE BLD GHB EST-MCNC: 123 MG/DL
GLOBULIN SER CALC-MCNC: 3 G/DL (ref 2.3–3.5)
GLUCOSE SERPL-MCNC: 111 MG/DL (ref 70–99)
HBA1C MFR BLD: 5.9 % (ref 0–5.6)
HCT VFR BLD AUTO: 48.9 % (ref 41.1–50.3)
HGB BLD-MCNC: 16 G/DL (ref 13.6–17.2)
IMM GRANULOCYTES # BLD AUTO: 0.03 K/UL (ref 0–0.5)
IMM GRANULOCYTES NFR BLD AUTO: 0.4 % (ref 0–5)
LYMPHOCYTES # BLD: 1.73 K/UL (ref 0.5–4.6)
LYMPHOCYTES NFR BLD: 22.6 % (ref 13–44)
MCH RBC QN AUTO: 32.7 PG (ref 26.1–32.9)
MCHC RBC AUTO-ENTMCNC: 32.7 G/DL (ref 31.4–35)
MCV RBC AUTO: 99.8 FL (ref 82–102)
MONOCYTES # BLD: 0.78 K/UL (ref 0.1–1.3)
MONOCYTES NFR BLD: 10.2 % (ref 4–12)
NEUTS SEG # BLD: 4.81 K/UL (ref 1.7–8.2)
NEUTS SEG NFR BLD: 62.9 % (ref 43–78)
NRBC # BLD: 0 K/UL (ref 0–0.2)
PLATELET # BLD AUTO: 247 K/UL (ref 150–450)
PMV BLD AUTO: 10.8 FL (ref 9.4–12.3)
POTASSIUM SERPL-SCNC: 4.7 MMOL/L (ref 3.5–5.1)
PROT SERPL-MCNC: 6.8 G/DL (ref 6.3–8.2)
RBC # BLD AUTO: 4.9 M/UL (ref 4.23–5.6)
SODIUM SERPL-SCNC: 141 MMOL/L (ref 136–145)
TSH W FREE THYROID IF ABNORMAL: 0.89 UIU/ML (ref 0.27–4.2)
WBC # BLD AUTO: 7.7 K/UL (ref 4.3–11.1)

## 2025-03-27 ASSESSMENT — PATIENT HEALTH QUESTIONNAIRE - PHQ9
5. POOR APPETITE OR OVEREATING: SEVERAL DAYS
10. IF YOU CHECKED OFF ANY PROBLEMS, HOW DIFFICULT HAVE THESE PROBLEMS MADE IT FOR YOU TO DO YOUR WORK, TAKE CARE OF THINGS AT HOME, OR GET ALONG WITH OTHER PEOPLE: SOMEWHAT DIFFICULT
SUM OF ALL RESPONSES TO PHQ QUESTIONS 1-9: 5
6. FEELING BAD ABOUT YOURSELF - OR THAT YOU ARE A FAILURE OR HAVE LET YOURSELF OR YOUR FAMILY DOWN: NOT AT ALL
4. FEELING TIRED OR HAVING LITTLE ENERGY: SEVERAL DAYS
3. TROUBLE FALLING OR STAYING ASLEEP: SEVERAL DAYS
2. FEELING DOWN, DEPRESSED OR HOPELESS: NOT AT ALL
SUM OF ALL RESPONSES TO PHQ QUESTIONS 1-9: 5
SUM OF ALL RESPONSES TO PHQ QUESTIONS 1-9: 5
1. LITTLE INTEREST OR PLEASURE IN DOING THINGS: SEVERAL DAYS
SUM OF ALL RESPONSES TO PHQ QUESTIONS 1-9: 5
9. THOUGHTS THAT YOU WOULD BE BETTER OFF DEAD, OR OF HURTING YOURSELF: NOT AT ALL
7. TROUBLE CONCENTRATING ON THINGS, SUCH AS READING THE NEWSPAPER OR WATCHING TELEVISION: NOT AT ALL
8. MOVING OR SPEAKING SO SLOWLY THAT OTHER PEOPLE COULD HAVE NOTICED. OR THE OPPOSITE, BEING SO FIGETY OR RESTLESS THAT YOU HAVE BEEN MOVING AROUND A LOT MORE THAN USUAL: SEVERAL DAYS

## 2025-03-27 ASSESSMENT — ENCOUNTER SYMPTOMS
CONSTIPATION: 0
BACK PAIN: 1
DIARRHEA: 0
ABDOMINAL PAIN: 0
VOMITING: 0
ABDOMINAL DISTENTION: 0
COUGH: 0
SHORTNESS OF BREATH: 0
NAUSEA: 0
CHEST TIGHTNESS: 0

## 2025-03-27 NOTE — PROGRESS NOTES
decision-making abilities or is life-limiting. Recommended the patient document care preferences in state-specific advance directives. Also reviewed the process of designating a trusted capable adult as an Agent (or Health Care Power of ) to make health care decisions for the patient if the patient becomes unable due to incapacity. Patient was asked to complete advance directive forms, if not already done, and to provide a dated, signed and witnessed (or notarized) copy, per the forms' requirements, to the practice office.    Time spent (minutes): <16 minutes (Non-Billable)                    Objective   Vitals:    03/27/25 0952 03/27/25 1100   BP: (!) 148/72 (!) 144/72   Pulse: 68 69   Temp: 97.7 °F (36.5 °C)    TempSrc: Temporal    SpO2: 97%    Weight: 89.9 kg (198 lb 3.2 oz)    Height: 1.676 m (5' 6\")       Body mass index is 31.99 kg/m².                    Allergies   Allergen Reactions    Codeine Rash     Tolerates Percocet without side- effects    Contrave [Naltrexone-Bupropion Hcl Er] Other (See Comments)     Side effects     Prior to Visit Medications    Medication Sig Taking? Authorizing Provider   amLODIPine (NORVASC) 5 MG tablet Take 1 tablet by mouth daily Yes Macey Barraza APRN - NP   nitroGLYCERIN (NITROSTAT) 0.3 MG SL tablet Place 1 tablet under the tongue every 5 minutes as needed for Chest pain Yes Simone Pendleton MD   rosuvastatin (CRESTOR) 40 MG tablet Take 1 tablet by mouth daily Yes Simone Pendleton MD   metoprolol succinate (TOPROL XL) 25 MG extended release tablet Take 1 tablet by mouth daily Yes Simone Pendleton MD   losartan (COZAAR) 100 MG tablet Take 1 tablet by mouth daily Yes Simone Pendleton MD   ipratropium (ATROVENT) 0.02 % nebulizer solution Take 2.5 mLs by nebulization 3 times daily Yes Maggi Barros MD   arformoterol tartrate (BROVANA) 15 MCG/2ML NEBU Take 2 mLs by nebulization 2 times daily Yes Maggi Barros MD   budesonide (PULMICORT) 0.5

## 2025-03-28 ENCOUNTER — TELEPHONE (OUTPATIENT)
Dept: INTERNAL MEDICINE CLINIC | Facility: CLINIC | Age: 75
End: 2025-03-28

## 2025-03-28 RX ORDER — AMLODIPINE BESYLATE 5 MG/1
5 TABLET ORAL DAILY
Qty: 100 TABLET | Refills: 3 | Status: SHIPPED | OUTPATIENT
Start: 2025-03-28

## 2025-03-28 NOTE — TELEPHONE ENCOUNTER
----- Message from MAR Hendrickson NP sent at 3/28/2025  7:48 AM EDT -----  Needs lab appt one week before next 6mth. Saad YBARRA

## 2025-03-31 ENCOUNTER — RESULTS FOLLOW-UP (OUTPATIENT)
Dept: INTERNAL MEDICINE CLINIC | Facility: CLINIC | Age: 75
End: 2025-03-31

## 2025-04-14 ENCOUNTER — TELEPHONE (OUTPATIENT)
Dept: PULMONOLOGY | Age: 75
End: 2025-04-14

## 2025-04-14 NOTE — TELEPHONE ENCOUNTER
Patient is having trouble with sob of breath  for awhile. Dr. Barros has been having him try some different things for while . Seems none of those are helping . He is asking to have a CT scan of  his lungs before his up coming visit on the 28th

## 2025-04-14 NOTE — TELEPHONE ENCOUNTER
Last seen: 1/8/25  Hx: CERDA, COPD, bronchiectasis, alpha1 deficient, exercise hypoxemia    Patient call reporting trouble w/ sob for some time, has tried several changes that have not alleviated sob, wants to have CT scan before next visit.    Contacted patient to clarify daily medication regimen, left msg to call back, but will route to Dr. Barros w/ CT order request.

## 2025-04-16 ENCOUNTER — TELEMEDICINE (OUTPATIENT)
Dept: PULMONOLOGY | Age: 75
End: 2025-04-16

## 2025-04-16 DIAGNOSIS — J44.9 STAGE 3 SEVERE COPD BY GOLD CLASSIFICATION (HCC): Primary | ICD-10-CM

## 2025-04-16 NOTE — TELEPHONE ENCOUNTER
Contacted patient and offered work in appt tomorrow afternoon. Patient cannot come to office, worked in for virtual appt this afternoon w/ MD to review needs.

## 2025-04-17 ENCOUNTER — CLINICAL DOCUMENTATION (OUTPATIENT)
Dept: PULMONOLOGY | Age: 75
End: 2025-04-17

## 2025-04-17 ENCOUNTER — OFFICE VISIT (OUTPATIENT)
Dept: PULMONOLOGY | Age: 75
End: 2025-04-17
Payer: MEDICARE

## 2025-04-17 ENCOUNTER — HOSPITAL ENCOUNTER (OUTPATIENT)
Dept: CT IMAGING | Age: 75
Discharge: HOME | End: 2025-04-19
Attending: INTERNAL MEDICINE
Payer: MEDICARE

## 2025-04-17 VITALS
BODY MASS INDEX: 31.02 KG/M2 | WEIGHT: 193 LBS | HEIGHT: 66 IN | OXYGEN SATURATION: 96 % | RESPIRATION RATE: 20 BRPM | SYSTOLIC BLOOD PRESSURE: 118 MMHG | TEMPERATURE: 98 F | HEART RATE: 75 BPM | DIASTOLIC BLOOD PRESSURE: 60 MMHG

## 2025-04-17 DIAGNOSIS — R09.02 EXERCISE HYPOXEMIA: ICD-10-CM

## 2025-04-17 DIAGNOSIS — R06.00 DYSPNEA, UNSPECIFIED TYPE: ICD-10-CM

## 2025-04-17 DIAGNOSIS — E88.01 ALPHA-1-ANTITRYPSIN DEFICIENCY (HCC): ICD-10-CM

## 2025-04-17 DIAGNOSIS — R06.00 DYSPNEA, UNSPECIFIED TYPE: Primary | ICD-10-CM

## 2025-04-17 DIAGNOSIS — J44.9 STAGE 2 MODERATE COPD BY GOLD CLASSIFICATION (HCC): ICD-10-CM

## 2025-04-17 PROCEDURE — 1123F ACP DISCUSS/DSCN MKR DOCD: CPT | Performed by: INTERNAL MEDICINE

## 2025-04-17 PROCEDURE — 3017F COLORECTAL CA SCREEN DOC REV: CPT | Performed by: INTERNAL MEDICINE

## 2025-04-17 PROCEDURE — 71250 CT THORAX DX C-: CPT

## 2025-04-17 PROCEDURE — 1036F TOBACCO NON-USER: CPT | Performed by: INTERNAL MEDICINE

## 2025-04-17 PROCEDURE — 1159F MED LIST DOCD IN RCRD: CPT | Performed by: INTERNAL MEDICINE

## 2025-04-17 PROCEDURE — 3078F DIAST BP <80 MM HG: CPT | Performed by: INTERNAL MEDICINE

## 2025-04-17 PROCEDURE — G8427 DOCREV CUR MEDS BY ELIG CLIN: HCPCS | Performed by: INTERNAL MEDICINE

## 2025-04-17 PROCEDURE — 3023F SPIROM DOC REV: CPT | Performed by: INTERNAL MEDICINE

## 2025-04-17 PROCEDURE — 3074F SYST BP LT 130 MM HG: CPT | Performed by: INTERNAL MEDICINE

## 2025-04-17 PROCEDURE — G8417 CALC BMI ABV UP PARAM F/U: HCPCS | Performed by: INTERNAL MEDICINE

## 2025-04-17 PROCEDURE — 99215 OFFICE O/P EST HI 40 MIN: CPT | Performed by: INTERNAL MEDICINE

## 2025-04-17 PROCEDURE — 1160F RVW MEDS BY RX/DR IN RCRD: CPT | Performed by: INTERNAL MEDICINE

## 2025-04-17 RX ORDER — REVEFENACIN 175 UG/3ML
1 SOLUTION RESPIRATORY (INHALATION) DAILY
Qty: 90 EACH | Refills: 11 | Status: SHIPPED | OUTPATIENT
Start: 2025-04-17

## 2025-04-17 RX ORDER — ALBUTEROL SULFATE 0.83 MG/ML
2.5 SOLUTION RESPIRATORY (INHALATION) EVERY 6 HOURS PRN
Qty: 360 ML | Refills: 3 | Status: CANCELLED | OUTPATIENT
Start: 2025-04-17

## 2025-04-17 RX ORDER — ARFORMOTEROL TARTRATE 15 UG/2ML
15 SOLUTION RESPIRATORY (INHALATION) 2 TIMES DAILY
Qty: 360 ML | Refills: 3 | Status: SHIPPED | OUTPATIENT
Start: 2025-04-17

## 2025-04-17 RX ORDER — BENRALIZUMAB 30 MG/ML
30 INJECTION, SOLUTION SUBCUTANEOUS
Qty: 1 ML | Refills: 11 | Status: SHIPPED | OUTPATIENT
Start: 2025-05-15

## 2025-04-17 RX ORDER — BUDESONIDE 0.5 MG/2ML
1 INHALANT ORAL 2 TIMES DAILY
Qty: 60 EACH | Refills: 3 | Status: SHIPPED | OUTPATIENT
Start: 2025-04-17

## 2025-04-17 RX ORDER — ALBUTEROL SULFATE 90 UG/1
2 INHALANT RESPIRATORY (INHALATION) EVERY 4 HOURS PRN
Qty: 3 EACH | Refills: 3 | Status: SHIPPED | OUTPATIENT
Start: 2025-04-17

## 2025-04-17 RX ORDER — BUDESONIDE, GLYCOPYRROLATE, AND FORMOTEROL FUMARATE 160; 9; 4.8 UG/1; UG/1; UG/1
2 AEROSOL, METERED RESPIRATORY (INHALATION) 2 TIMES DAILY
Qty: 3 EACH | Refills: 3 | Status: CANCELLED | OUTPATIENT
Start: 2025-04-17

## 2025-04-17 NOTE — PATIENT INSTRUCTIONS
Today we are going to be more aggressive about figuring out your shortness of breath.     Will get you scheduled for the next available lung function test.  A CT of the chest will be done today  We will use an ALL NEBULIZER approach with inhaler treatment.  [facility]  Brovana/Aformoterol nebulizer twice a day (mix with budesonide)  Budesonide Nebulizer twice a day (can mix with aformoterol).  RINSE MOUTH AFTERWARDS.  Yupelri nebulizer once every morning.  Albuterol nebulizer mid-day and also as needed for severe shortness of breath.     MORNING NOON NIGHT   AFORMOTEROL ALBUTEROL AFORMOTEROL   BUDESONIDE  BUDESONIDE        YUPELRI        4. Starting Fasenra today.  Will talk to Javed, our specialty pharmacist from [facility] about whether this [facility] or should go to a different specialty pharmacy.  This is an injected therapy given once a month for COPD.    5.  If we can find no pulmonary cause for your worsening exertional dyspnea then we should coordinate with Dr. Pendleton about a [facility].      If you have questions or concerns regarding your recently prescribed specialty drug, including approval or delivery status, please contact Brandon at the number below.

## 2025-04-17 NOTE — PROGRESS NOTES
antitrypsin deficiency (PI*MF- renders half of his A1AT protein nonfunctional), mild [place]OL[place], nocturnal hypoxemia on 2 L/min of supplemental oxygen at night, CAD s/p CABG x 3 in 2015.      He continues Prolastin therapy which is going well every Wednesday (has been on this for several years).  He goes to [facility] in [place].     In June 2023, we initiated low-dose azithromycin on Monday Wednesday Friday and increased his inhaler regimen to breztri2 puffs twice daily.  Since then he has had fewer cases of bronchitis.      Today he reports his dyspnea is severe.  He is not wheezing and not coughing much.  Reliably uses Breztri 2 puffs twice a day.  His dyspnea has worsened further since his last visit here in January.  He can hardly walk to the bathroom without severe dyspnea.    He currently uses:  Brovana twice a day  Breztri 2 puffs twice a day  Albuterol inhaler     Oxygen is okay at pulmonary rehab even with exertion.    Tobacco Use: Medium Risk (4/17/2025)    Patient History     Smoking Tobacco Use: Former     Smokeless Tobacco Use: Never     Passive Exposure: Not on file     Physical exam:    Vitals:    04/17/25 1423   BP: 118/60   Pulse: 75   Resp: 20   Temp: 98 °F (36.7 °C)   TempSrc: Temporal   SpO2: 96%   Weight: 87.5 kg (193 lb)   Height: 1.676 m (5' 6\")         4/17/2025     2:23 PM 3/27/2025     9:52 AM 2/24/2025     9:56 AM 1/14/2025     1:39 PM 1/8/2025    10:20 AM 12/10/2024     9:54 AM 10/15/2024    10:26 AM   Weight Metrics   Weight 193 lb 198 lb 3.2 oz 196 lb 197 lb 6.4 oz 195 lb 194 lb 12.8 oz 186 lb   BMI (Calculated) 31.2 kg/m2 32.1 kg/m2 31.7 kg/m2 31.9 kg/m2 31.5 kg/m2 0 kg/m2 30.1 kg/m2     Body mass index is 31.15 kg/m².  General Appearance:  The patient is pleasa and in no respiratory distress.  HEENT: PERRLA.  Conjunctivae unremarkable.  Nasal mucosa is without epistaxis, exudate, or polyps.  Gums and dentition are unremarkable.  There is no oropharyngeal narrowing.  TMs are

## 2025-04-17 NOTE — PROGRESS NOTES
Patient came to the office today to administer their first Fascenra injection with assistance. Patient waited 20 minutes after injection to verify any reaction. Patient tolerated the injection well and did not show any signs or symptoms of distress.       NDC [id number]  [id number]  Exp 2027-03

## 2025-04-21 ENCOUNTER — TELEPHONE (OUTPATIENT)
Age: 75
End: 2025-04-21

## 2025-04-21 NOTE — TELEPHONE ENCOUNTER
Patient called c/o SOB and fatigue, which has been going on for awhile. Patient states that he saw Dr Barros 4-17-25, did Chest CT 4-17-25 which, per patient, was ok.  Patient states that he is going on a cruise end of May first of June and would like to see if he needs a heart cath to see if his heart could be the reason he is having SOB and fatigue. Dr Barros suggested that he have [facility].      Last office visit 2-24-25  Last echo 10-15-24  Calista 7-16-24

## 2025-04-21 NOTE — TELEPHONE ENCOUNTER
Patient called with Dr. Pendleton' response. Has appointment open Tuesday 4-22 at 415 [address]. Patient will come at this time. //[email or web address]

## 2025-04-22 ENCOUNTER — OFFICE VISIT (OUTPATIENT)
Age: 75
End: 2025-04-22
Payer: MEDICARE

## 2025-04-22 VITALS
WEIGHT: 194.7 LBS | DIASTOLIC BLOOD PRESSURE: 70 MMHG | BODY MASS INDEX: 31.29 KG/M2 | HEART RATE: 74 BPM | HEIGHT: 66 IN | SYSTOLIC BLOOD PRESSURE: 137 MMHG

## 2025-04-22 DIAGNOSIS — R06.02 SOB (SHORTNESS OF BREATH): Primary | ICD-10-CM

## 2025-04-22 DIAGNOSIS — J44.9 CHRONIC OBSTRUCTIVE PULMONARY DISEASE, UNSPECIFIED COPD TYPE (HCC): Chronic | ICD-10-CM

## 2025-04-22 DIAGNOSIS — R07.89 CHEST TIGHTNESS: ICD-10-CM

## 2025-04-22 DIAGNOSIS — I25.10 CORONARY ARTERY DISEASE INVOLVING NATIVE CORONARY ARTERY OF NATIVE HEART WITHOUT ANGINA PECTORIS: ICD-10-CM

## 2025-04-22 DIAGNOSIS — G47.33 OSA (OBSTRUCTIVE SLEEP APNEA): Chronic | ICD-10-CM

## 2025-04-22 DIAGNOSIS — I34.0 NONRHEUMATIC MITRAL VALVE REGURGITATION: ICD-10-CM

## 2025-04-22 DIAGNOSIS — I10 ESSENTIAL HYPERTENSION: Chronic | ICD-10-CM

## 2025-04-22 DIAGNOSIS — Z95.1 S/P CABG (CORONARY ARTERY BYPASS GRAFT): ICD-10-CM

## 2025-04-22 DIAGNOSIS — Z87.891 HISTORY OF TOBACCO ABUSE: ICD-10-CM

## 2025-04-22 DIAGNOSIS — R53.82 CHRONIC FATIGUE: ICD-10-CM

## 2025-04-22 DIAGNOSIS — R06.02 SOB (SHORTNESS OF BREATH): ICD-10-CM

## 2025-04-22 DIAGNOSIS — J47.9 BRONCHIECTASIS, UNCOMPLICATED (HCC): ICD-10-CM

## 2025-04-22 DIAGNOSIS — E78.2 MIXED HYPERLIPIDEMIA: Chronic | ICD-10-CM

## 2025-04-22 DIAGNOSIS — I07.1 TRICUSPID VALVE INSUFFICIENCY, UNSPECIFIED ETIOLOGY: ICD-10-CM

## 2025-04-22 DIAGNOSIS — I51.7 LVH (LEFT VENTRICULAR HYPERTROPHY): ICD-10-CM

## 2025-04-22 LAB
ANION GAP SERPL CALC-SCNC: 12 MMOL/L (ref 7–16)
BASOPHILS # BLD: 0.02 K/UL (ref 0–0.2)
BASOPHILS NFR BLD: 0.3 % (ref 0–2)
BUN SERPL-MCNC: 9 MG/DL (ref 8–23)
CALCIUM SERPL-MCNC: 9.6 MG/DL (ref 8.8–10.2)
CHLORIDE SERPL-SCNC: 106 MMOL/L (ref 98–107)
CO2 SERPL-SCNC: 21 MMOL/L (ref 20–29)
CREAT SERPL-MCNC: 0.88 MG/DL (ref 0.8–1.3)
DIFFERENTIAL METHOD BLD: ABNORMAL
EOSINOPHIL # BLD: 0 K/UL (ref 0–0.8)
EOSINOPHIL NFR BLD: 0 % (ref 0.5–7.8)
ERYTHROCYTE [DISTWIDTH] IN BLOOD BY AUTOMATED COUNT: 12.1 % (ref 11.9–14.6)
GLUCOSE SERPL-MCNC: 90 MG/DL (ref 70–99)
HCT VFR BLD AUTO: 45.1 % (ref 41.1–50.3)
HGB BLD-MCNC: 15.5 G/DL (ref 13.6–17.2)
IMM GRANULOCYTES # BLD AUTO: 0.02 K/UL (ref 0–0.5)
IMM GRANULOCYTES NFR BLD AUTO: 0.3 % (ref 0–5)
LYMPHOCYTES # BLD: 1.75 K/UL (ref 0.5–4.6)
LYMPHOCYTES NFR BLD: 28.4 % (ref 13–44)
MCH RBC QN AUTO: 32.9 PG (ref 26.1–32.9)
MCHC RBC AUTO-ENTMCNC: 34.4 G/DL (ref 31.4–35)
MCV RBC AUTO: 95.8 FL (ref 82–102)
MONOCYTES # BLD: 0.82 K/UL (ref 0.1–1.3)
MONOCYTES NFR BLD: 13.3 % (ref 4–12)
NEUTS SEG # BLD: 3.55 K/UL (ref 1.7–8.2)
NEUTS SEG NFR BLD: 57.7 % (ref 43–78)
NRBC # BLD: 0 K/UL (ref 0–0.2)
PLATELET # BLD AUTO: 248 K/UL (ref 150–450)
PMV BLD AUTO: 10.4 FL (ref 9.4–12.3)
POTASSIUM SERPL-SCNC: 4.4 MMOL/L (ref 3.5–5.1)
RBC # BLD AUTO: 4.71 M/UL (ref 4.23–5.6)
SODIUM SERPL-SCNC: 138 MMOL/L (ref 136–145)
WBC # BLD AUTO: 6.2 K/UL (ref 4.3–11.1)

## 2025-04-22 PROCEDURE — 1126F AMNT PAIN NOTED NONE PRSNT: CPT | Performed by: INTERNAL MEDICINE

## 2025-04-22 PROCEDURE — 93000 ELECTROCARDIOGRAM COMPLETE: CPT | Performed by: INTERNAL MEDICINE

## 2025-04-22 PROCEDURE — G8417 CALC BMI ABV UP PARAM F/U: HCPCS | Performed by: INTERNAL MEDICINE

## 2025-04-22 PROCEDURE — 1036F TOBACCO NON-USER: CPT | Performed by: INTERNAL MEDICINE

## 2025-04-22 PROCEDURE — 99215 OFFICE O/P EST HI 40 MIN: CPT | Performed by: INTERNAL MEDICINE

## 2025-04-22 PROCEDURE — 3078F DIAST BP <80 MM HG: CPT | Performed by: INTERNAL MEDICINE

## 2025-04-22 PROCEDURE — 1160F RVW MEDS BY RX/DR IN RCRD: CPT | Performed by: INTERNAL MEDICINE

## 2025-04-22 PROCEDURE — 3075F SYST BP GE 130 - 139MM HG: CPT | Performed by: INTERNAL MEDICINE

## 2025-04-22 PROCEDURE — 1123F ACP DISCUSS/DSCN MKR DOCD: CPT | Performed by: INTERNAL MEDICINE

## 2025-04-22 PROCEDURE — 1159F MED LIST DOCD IN RCRD: CPT | Performed by: INTERNAL MEDICINE

## 2025-04-22 PROCEDURE — G8427 DOCREV CUR MEDS BY ELIG CLIN: HCPCS | Performed by: INTERNAL MEDICINE

## 2025-04-22 PROCEDURE — 3023F SPIROM DOC REV: CPT | Performed by: INTERNAL MEDICINE

## 2025-04-22 PROCEDURE — 3017F COLORECTAL CA SCREEN DOC REV: CPT | Performed by: INTERNAL MEDICINE

## 2025-04-22 RX ORDER — HYDROCORTISONE SODIUM SUCCINATE 100 MG/2ML
200 INJECTION INTRAMUSCULAR; INTRAVENOUS ONCE
OUTPATIENT
Start: 2025-04-22 | End: 2025-04-22

## 2025-04-22 RX ORDER — SODIUM CHLORIDE 0.9 % (FLUSH) 0.9 %
5-40 SYRINGE (ML) INJECTION PRN
OUTPATIENT
Start: 2025-04-22

## 2025-04-22 RX ORDER — DIPHENHYDRAMINE HYDROCHLORIDE 50 MG/ML
50 INJECTION, SOLUTION INTRAMUSCULAR; INTRAVENOUS ONCE
OUTPATIENT
Start: 2025-04-22 | End: 2025-04-22

## 2025-04-22 RX ORDER — SODIUM CHLORIDE 9 MG/ML
INJECTION, SOLUTION INTRAVENOUS CONTINUOUS
OUTPATIENT
Start: 2025-04-22

## 2025-04-22 RX ORDER — ASPIRIN 325 MG
325 TABLET ORAL ONCE
OUTPATIENT
Start: 2025-04-22 | End: 2025-04-22

## 2025-04-22 RX ORDER — SODIUM CHLORIDE 9 MG/ML
INJECTION, SOLUTION INTRAVENOUS PRN
OUTPATIENT
Start: 2025-04-22

## 2025-04-22 RX ORDER — ONDANSETRON 2 MG/ML
4 INJECTION INTRAMUSCULAR; INTRAVENOUS EVERY 6 HOURS PRN
OUTPATIENT
Start: 2025-04-22

## 2025-04-22 RX ORDER — SODIUM CHLORIDE 0.9 % (FLUSH) 0.9 %
5-40 SYRINGE (ML) INJECTION EVERY 12 HOURS SCHEDULED
OUTPATIENT
Start: 2025-04-22

## 2025-04-22 RX ORDER — ISOSORBIDE MONONITRATE 30 MG/1
30 TABLET, EXTENDED RELEASE ORAL DAILY
Qty: 15 TABLET | Refills: 0 | Status: SHIPPED | OUTPATIENT
Start: 2025-04-22

## 2025-04-22 ASSESSMENT — ENCOUNTER SYMPTOMS
DIARRHEA: 0
COUGH: 1
ABDOMINAL DISTENTION: 0
SHORTNESS OF BREATH: 1
CONSTIPATION: 0
PHOTOPHOBIA: 0
WHEEZING: 1
SORE THROAT: 0
CHEST TIGHTNESS: 1
ABDOMINAL PAIN: 0

## 2025-04-22 NOTE — PROGRESS NOTES
[facility] CARDIOLOGY  [address] [place] [address], [address]  [place] [address]  PHONE: [phone]        NAME:  Tramaine Harding  : 1950  MRN: [id number]     PCP:  Macey Barraza APRN - NP      SUBJECTIVE:   Tramaine Harding is a 75 y.o. male seen for a follow up visit regarding the following:     Chief Complaint   Patient presents with    Shortness of Breath    Fatigue       HPI:    No heart failure or arrhythmic symptoms but presents with 2 weeks of steadily worsening exertional dyspnea, substernal chest tightness, worse with exertion and relieved with rest.  Symptoms slowly worsening and worrisome for accelerating angina with anginal equivalent.  Clinically euvolemic today but worried about his shortness of breath.   Recently seen by pulmonology who does not think his symptoms are pulmonary related.     He has CAD with prior bypass, COPD, but also hypoxic on O2 at home... Intolerant to multiple forms of CPAP and seeing pulmonary with COPD and alpha-1 antripsyn deficiency.  Encouraged him to try to find a CPAP mask he can tolerate.  ? Inspire candidate?  Defer to pulmonary.       Nuclear stress test 2024:    Stress Combined Conclusion: Findings suggest a low risk of cardiac events.    Perfusion Comments: LV perfusion is probably normal.    Perfusion Defect: There is a moderate severity left ventricular stress perfusion defect that is large in size present in the inferior segment(s) that is fixed. This defect was visualized during the stress and rest phases of imaging. The defect appears to be an artifact caused by subdiaphragmatic activity.    Perfusion Conclusion: There is no evidence of transient ischemic dilation (TID).    ECG: Resting ECG demonstrates normal sinus rhythm.    ECG: The stress ECG was negative for ischemia. The stress ECG was not diagnostic due to pharmacologic protocol.    Echocardiogram 10/16/2024:  Left Ventricle Normal left ventricular systolic function with a visually

## 2025-04-25 ENCOUNTER — CLINICAL DOCUMENTATION (OUTPATIENT)
Dept: PULMONOLOGY | Age: 75
End: 2025-04-25

## 2025-04-30 NOTE — PROGRESS NOTES
Patient pre-assessment complete for Tramaine Harding scheduled for R/[facility], arrival time 1030. Patient verified using . Patient instructed to bring a list of all home medications on the day of procedure. NPO status reinforced. Patient informed to take a full dose aspirin 325mg  or 81 mg x 4 on the day of procedure.. Instructed they can take all other medications excluding vitamins & supplements. Patient verbalizes understanding of all instructions & denies any questions at this time.

## 2025-05-01 ENCOUNTER — HOSPITAL ENCOUNTER (OUTPATIENT)
Age: 75
Setting detail: OUTPATIENT SURGERY
Discharge: HOME | End: 2025-05-01
Attending: INTERNAL MEDICINE | Admitting: INTERNAL MEDICINE
Payer: MEDICARE

## 2025-05-01 VITALS
HEART RATE: 76 BPM | TEMPERATURE: 97.7 F | OXYGEN SATURATION: 93 % | HEIGHT: 66 IN | WEIGHT: 194 LBS | DIASTOLIC BLOOD PRESSURE: 70 MMHG | SYSTOLIC BLOOD PRESSURE: 119 MMHG | BODY MASS INDEX: 31.18 KG/M2

## 2025-05-01 DIAGNOSIS — R07.89 CHEST DISCOMFORT: ICD-10-CM

## 2025-05-01 DIAGNOSIS — R06.02 SHORTNESS OF BREATH: ICD-10-CM

## 2025-05-01 LAB
ANION GAP SERPL CALC-SCNC: 11 MMOL/L (ref 7–16)
BUN SERPL-MCNC: 14 MG/DL (ref 8–23)
CALCIUM SERPL-MCNC: 9.8 MG/DL (ref 8.8–10.2)
CHLORIDE SERPL-SCNC: 107 MMOL/L (ref 98–107)
CO2 SERPL-SCNC: 22 MMOL/L (ref 20–29)
CREAT SERPL-MCNC: 1.17 MG/DL (ref 0.8–1.3)
ECHO BSA: 2.02 M2
EKG ATRIAL RATE: 73 BPM
EKG DIAGNOSIS: NORMAL
EKG P AXIS: 11 DEGREES
EKG P-R INTERVAL: 160 MS
EKG Q-T INTERVAL: 380 MS
EKG QRS DURATION: 88 MS
EKG QTC CALCULATION (BAZETT): 418 MS
EKG R AXIS: 54 DEGREES
EKG T AXIS: 69 DEGREES
EKG VENTRICULAR RATE: 73 BPM
ERYTHROCYTE [DISTWIDTH] IN BLOOD BY AUTOMATED COUNT: 11.9 % (ref 11.9–14.6)
GLUCOSE SERPL-MCNC: 101 MG/DL (ref 70–99)
HCT VFR BLD AUTO: 47.4 % (ref 41.1–50.3)
HGB BLD-MCNC: 15.8 G/DL (ref 13.6–17.2)
MAGNESIUM SERPL-MCNC: 2.1 MG/DL (ref 1.8–2.4)
MCH RBC QN AUTO: 33.3 PG (ref 26.1–32.9)
MCHC RBC AUTO-ENTMCNC: 33.3 G/DL (ref 31.4–35)
MCV RBC AUTO: 100 FL (ref 82–102)
NRBC # BLD: 0 K/UL (ref 0–0.2)
PLATELET # BLD AUTO: 190 K/UL (ref 150–450)
PMV BLD AUTO: 10.8 FL (ref 9.4–12.3)
POTASSIUM BLD-SCNC: 4.3 MMOL/L (ref 3.5–5.1)
POTASSIUM SERPL-SCNC: ABNORMAL MMOL/L (ref 3.5–5.1)
RBC # BLD AUTO: 4.74 M/UL (ref 4.23–5.6)
SODIUM SERPL-SCNC: 139 MMOL/L (ref 136–145)
WBC # BLD AUTO: 6.3 K/UL (ref 4.3–11.1)

## 2025-05-01 PROCEDURE — 93461 R&L HRT ART/VENTRICLE ANGIO: CPT | Performed by: INTERNAL MEDICINE

## 2025-05-01 PROCEDURE — 93005 ELECTROCARDIOGRAM TRACING: CPT | Performed by: INTERNAL MEDICINE

## 2025-05-01 PROCEDURE — C1769 GUIDE WIRE: HCPCS | Performed by: INTERNAL MEDICINE

## 2025-05-01 PROCEDURE — 99152 MOD SED SAME PHYS/QHP 5/>YRS: CPT | Performed by: INTERNAL MEDICINE

## 2025-05-01 PROCEDURE — 2709999900 HC NON-CHARGEABLE SUPPLY: Performed by: INTERNAL MEDICINE

## 2025-05-01 PROCEDURE — 6360000002 HC RX W HCPCS: Performed by: INTERNAL MEDICINE

## 2025-05-01 PROCEDURE — C1894 INTRO/SHEATH, NON-LASER: HCPCS | Performed by: INTERNAL MEDICINE

## 2025-05-01 PROCEDURE — 93010 ELECTROCARDIOGRAM REPORT: CPT | Performed by: INTERNAL MEDICINE

## 2025-05-01 PROCEDURE — 85027 COMPLETE CBC AUTOMATED: CPT

## 2025-05-01 PROCEDURE — C1760 CLOSURE DEV, VASC: HCPCS | Performed by: INTERNAL MEDICINE

## 2025-05-01 PROCEDURE — 99153 MOD SED SAME PHYS/QHP EA: CPT | Performed by: INTERNAL MEDICINE

## 2025-05-01 PROCEDURE — 93005 ELECTROCARDIOGRAM TRACING: CPT

## 2025-05-01 PROCEDURE — 84132 ASSAY OF SERUM POTASSIUM: CPT

## 2025-05-01 PROCEDURE — 6360000004 HC RX CONTRAST MEDICATION: Performed by: INTERNAL MEDICINE

## 2025-05-01 PROCEDURE — C1751 CATH, INF, PER/CENT/MIDLINE: HCPCS | Performed by: INTERNAL MEDICINE

## 2025-05-01 PROCEDURE — 83735 ASSAY OF MAGNESIUM: CPT

## 2025-05-01 PROCEDURE — 80048 BASIC METABOLIC PNL TOTAL CA: CPT

## 2025-05-01 RX ORDER — ACETAMINOPHEN 325 MG/1
650 TABLET ORAL EVERY 4 HOURS PRN
Status: CANCELLED | OUTPATIENT
Start: 2025-05-01

## 2025-05-01 RX ORDER — SODIUM CHLORIDE 9 MG/ML
INJECTION, SOLUTION INTRAVENOUS CONTINUOUS
Status: DISCONTINUED | OUTPATIENT
Start: 2025-05-01 | End: 2025-05-01 | Stop reason: HOSPADM

## 2025-05-01 RX ORDER — SODIUM CHLORIDE 0.9 % (FLUSH) 0.9 %
5-40 SYRINGE (ML) INJECTION PRN
Status: CANCELLED | OUTPATIENT
Start: 2025-05-01

## 2025-05-01 RX ORDER — ONDANSETRON 2 MG/ML
4 INJECTION INTRAMUSCULAR; INTRAVENOUS EVERY 6 HOURS PRN
Status: DISCONTINUED | OUTPATIENT
Start: 2025-05-01 | End: 2025-05-01 | Stop reason: HOSPADM

## 2025-05-01 RX ORDER — ASPIRIN 325 MG
325 TABLET ORAL ONCE
Status: DISCONTINUED | OUTPATIENT
Start: 2025-05-01 | End: 2025-05-01 | Stop reason: HOSPADM

## 2025-05-01 RX ORDER — HEPARIN SODIUM 200 [USP'U]/100ML
INJECTION, SOLUTION INTRAVENOUS CONTINUOUS PRN
Status: DISCONTINUED | OUTPATIENT
Start: 2025-05-01 | End: 2025-05-01 | Stop reason: HOSPADM

## 2025-05-01 RX ORDER — CEFAZOLIN SODIUM 1 G/3ML
INJECTION, POWDER, FOR SOLUTION INTRAMUSCULAR; INTRAVENOUS PRN
Status: DISCONTINUED | OUTPATIENT
Start: 2025-05-01 | End: 2025-05-01 | Stop reason: HOSPADM

## 2025-05-01 RX ORDER — LIDOCAINE HYDROCHLORIDE 10 MG/ML
INJECTION, SOLUTION INFILTRATION; PERINEURAL PRN
Status: DISCONTINUED | OUTPATIENT
Start: 2025-05-01 | End: 2025-05-01 | Stop reason: HOSPADM

## 2025-05-01 RX ORDER — SODIUM CHLORIDE 0.9 % (FLUSH) 0.9 %
5-40 SYRINGE (ML) INJECTION EVERY 12 HOURS SCHEDULED
Status: DISCONTINUED | OUTPATIENT
Start: 2025-05-01 | End: 2025-05-01 | Stop reason: HOSPADM

## 2025-05-01 RX ORDER — DIPHENHYDRAMINE HYDROCHLORIDE 50 MG/ML
50 INJECTION, SOLUTION INTRAMUSCULAR; INTRAVENOUS ONCE
Status: DISCONTINUED | OUTPATIENT
Start: 2025-05-01 | End: 2025-05-01 | Stop reason: HOSPADM

## 2025-05-01 RX ORDER — SODIUM CHLORIDE 0.9 % (FLUSH) 0.9 %
5-40 SYRINGE (ML) INJECTION PRN
Status: DISCONTINUED | OUTPATIENT
Start: 2025-05-01 | End: 2025-05-01 | Stop reason: HOSPADM

## 2025-05-01 RX ORDER — HYDROCORTISONE SODIUM SUCCINATE 100 MG/2ML
200 INJECTION INTRAMUSCULAR; INTRAVENOUS ONCE
Status: DISCONTINUED | OUTPATIENT
Start: 2025-05-01 | End: 2025-05-01 | Stop reason: HOSPADM

## 2025-05-01 RX ORDER — SODIUM CHLORIDE 0.9 % (FLUSH) 0.9 %
5-40 SYRINGE (ML) INJECTION EVERY 12 HOURS SCHEDULED
Status: CANCELLED | OUTPATIENT
Start: 2025-05-01

## 2025-05-01 RX ORDER — IOPAMIDOL 755 MG/ML
INJECTION, SOLUTION INTRAVASCULAR PRN
Status: DISCONTINUED | OUTPATIENT
Start: 2025-05-01 | End: 2025-05-01 | Stop reason: HOSPADM

## 2025-05-01 RX ORDER — SODIUM CHLORIDE 9 MG/ML
INJECTION, SOLUTION INTRAVENOUS PRN
Status: CANCELLED | OUTPATIENT
Start: 2025-05-01

## 2025-05-01 RX ORDER — MIDAZOLAM HYDROCHLORIDE 1 MG/ML
INJECTION, SOLUTION INTRAMUSCULAR; INTRAVENOUS PRN
Status: DISCONTINUED | OUTPATIENT
Start: 2025-05-01 | End: 2025-05-01 | Stop reason: HOSPADM

## 2025-05-01 RX ORDER — SODIUM CHLORIDE 9 MG/ML
INJECTION, SOLUTION INTRAVENOUS PRN
Status: DISCONTINUED | OUTPATIENT
Start: 2025-05-01 | End: 2025-05-01 | Stop reason: HOSPADM

## 2025-05-01 NOTE — DISCHARGE INSTRUCTIONS
HEART CATHETERIZATION/ANGIOGRAPHY DISCHARGE INSTRUCTIONS    Check puncture site frequently for swelling or bleeding. If there is any bleeding, apply pressure over the area with a clean towel or washcloth and call 911. Notify your doctor for any redness, swelling, drainage, or oozing from the puncture site. Notify your doctor for any fever or chills.  If the extremity becomes cold, numb, or painful call [facility]  Activity should be limited for the next 48 hours. No heavy lifting, pushing, pulling  or strenuous activity for 48 hours. No heavy lifting (anything over 10 pounds) for 3 days.  You may resume your usual diet. Drink more fluids than usual.  Have a responsible person drive you home and stay with you for at least 24 hours after your heart catheterization/angiography.  You may remove bandage from your R groin in 24 hours. You may shower in 24 hours. No tub baths, hot tubs, or swimming for 1 week. Do not place any lotions, creams, powders, or ointments over puncture site for 1 week. You may place a clean band-aid over the puncture site each day for 5 days. Change daily.        Sedation for a Medical Procedure: Care Instructions     You were given a sedative medication during your visit. While many of the effects will have worn   off before you leave; you may continue to feel some effects for several hours.      Common side effects from sedation include:  Feeling sleepy. (Your doctors and nurses will make sure you are not too sleepy to go home.)  Nausea and vomiting. This usually does not last long.  Feeling tired.     How can you care for yourself at home?  Activity    Don't do anything for 24 hours that requires attention to detail. It takes time for the medicine effects to completely wear off.     Do not make important legal decisions for 24 hours.     Do not sign any legal documents for 24 hours.     Do not drink alcohol today     For your safety, you should not drive or operate heavy machinery for

## 2025-05-01 NOTE — PROGRESS NOTES
TRANSFER - IN REPORT:    Verbal report received from ANTWAN Mckinnon on Tramaine Harding being received from [facility] for routine post-op      Report consisted of patient’s Situation, Background, Assessment and Recommendations(SBAR).     Information from the following report(s) Procedure Summary was reviewed with the receiving nurse.    Opportunity for questions and clarification was provided.      Assessment completed upon patient’s arrival to unit and care assumed.

## 2025-05-01 NOTE — PROGRESS NOTES
Pt arrived, ambulated to room with no visible problems, planned R/L[facility] for Dr Pendleton.  Consent signed, Procedure discussed with pt all questions answered voiced understanding.     Medications and history discussed with pt.    Pt prepped per ordersThe patient has a fraility score of 3-MANAGING WELL, based on no need for assistance    Patient took Aspirin 324mg today at 0800 prior to arrival.

## 2025-05-01 NOTE — PROGRESS NOTES
[facility] w/ Dr. Pendleton    [facility] - RFV access  7fr sheath removed, closed w/ Vascade, covered w/ gauze/tegaderm    [facility] - No interventions  RFA access  6fr sheath removed, closed w/ Mynx, covered w/ gauze/tegaderm    No s/sxs of bleeding or hematoma to RFV/RFA access sites    Versed 2mg IV  Fentanyl 50mcg IV  Ancef 2g IV    TRANSFER - OUT REPORT:    Verbal report given to Cookie Hughes RN on Tramaine Harding  being transferred to CPRU for routine progression of patient care       Report consisted of patient's Situation, Background, Assessment and   Recommendations(SBAR).     Information from the following report(s) Nurse Handoff Report and MAR was reviewed with the receiving nurse.    Opportunity for questions and clarification was provided.      Patient transported with:  Registered Nurse

## 2025-05-02 DIAGNOSIS — J44.9 STAGE 2 MODERATE COPD BY GOLD CLASSIFICATION (HCC): Primary | ICD-10-CM

## 2025-05-02 NOTE — TELEPHONE ENCOUNTER
Received denial for Carola.    Per Dr. Barros patient does qualify for Dupixent and ok to order if patient agrees. Patient agrees to Dupixent and wants to try to get through [facility] and if denied wants to go through regular insurance.    Rx placed and routed to Dr. Barros to sign.    Patient on Prolastin and need to clarify if dupixent has to be given on alternate week.

## 2025-05-02 NOTE — TELEPHONE ENCOUNTER
Called Juan Ramon Santoyo,  for Dupixent. He states that there is no contraindication in the package insert with Prolastin. He stated that Dupixent isn't processed through the [facility]. He is going to have a Pharmacy Tech call me and go over the medications. He also states that Dupixent has no med interactions.

## 2025-05-11 NOTE — PROGRESS NOTES
Priority: Low     Overview Note:     managed with medication         Essential hypertension      Priority: Low    Umbilical hernia without obstruction and without gangrene 07/22/2021     Priority: Low    Fasting hyperglycemia 09/30/2019     Priority: Low     Overview Note:     Hx of        COPD (chronic obstructive pulmonary disease) ([facility])      Priority: Low     Overview Note:     Spirometry 11/11/15--mild obstruction        Chronic fatigue 08/29/2018     Priority: Low    Shortness of breath 08/29/2018     Priority: Low    Vitamin B 12 deficiency 08/23/2018     Priority: Low    Mitral valve regurgitation      Priority: Low    Tricuspid valvular regurgitation      Priority: Low    LVH (left ventricular hypertrophy)      Priority: Low    CAROLINA (obstructive sleep apnea) 02/26/2018     Priority: Low    Tachycardia 01/24/2018     Priority: Low    H/O total hip arthroplasty, right 01/13/2018     Priority: Low    Status post right hip replacement 01/12/2018     Priority: Low    GERD (gastroesophageal reflux disease)      Priority: Low     Overview Note:     managed with medication         Left atrial dilation 10/01/2016     Priority: Low    Intermittent palpitations      Priority: Low    Chest discomfort 01/26/2016     Priority: Low    Abnormal cardiovascular function study 01/26/2016     Priority: Low    Alpha-1-antitrypsin deficiency (HCC) 10/06/2015     Priority: Low    History of tobacco abuse 09/16/2015     Priority: Low     Overview Note:     2 ppd, 30 years. Albuterol PRN.         CAD (coronary artery disease) 09/16/2015     Priority: Low     Overview Note:     9/16/15 (Dr Vargas) Coronary artery bypass grafting x3         S/P CABG (coronary artery bypass graft) 09/16/2015     Priority: Low     Overview Note:      LIMA TO LAD, SVG TO OM, SVG TO DISTAL RCA.   DR. VARGAS, 9/2015        Mitral regurgitation      Priority: Low    Impotence of organic origin      Priority: Low    Claustrophobia      Priority: Low

## 2025-05-16 ENCOUNTER — OFFICE VISIT (OUTPATIENT)
Age: 75
End: 2025-05-16
Payer: MEDICARE

## 2025-05-16 VITALS
SYSTOLIC BLOOD PRESSURE: 110 MMHG | WEIGHT: 192 LBS | HEART RATE: 74 BPM | DIASTOLIC BLOOD PRESSURE: 60 MMHG | BODY MASS INDEX: 30.86 KG/M2 | HEIGHT: 66 IN

## 2025-05-16 DIAGNOSIS — G47.33 OSA (OBSTRUCTIVE SLEEP APNEA): Chronic | ICD-10-CM

## 2025-05-16 DIAGNOSIS — I25.10 CORONARY ARTERY DISEASE INVOLVING NATIVE CORONARY ARTERY OF NATIVE HEART WITHOUT ANGINA PECTORIS: Primary | ICD-10-CM

## 2025-05-16 DIAGNOSIS — J44.9 CHRONIC OBSTRUCTIVE PULMONARY DISEASE, UNSPECIFIED COPD TYPE (HCC): Chronic | ICD-10-CM

## 2025-05-16 DIAGNOSIS — Z95.1 S/P CABG (CORONARY ARTERY BYPASS GRAFT): ICD-10-CM

## 2025-05-16 DIAGNOSIS — E78.2 MIXED HYPERLIPIDEMIA: Chronic | ICD-10-CM

## 2025-05-16 DIAGNOSIS — I34.0 NONRHEUMATIC MITRAL VALVE REGURGITATION: ICD-10-CM

## 2025-05-16 DIAGNOSIS — I51.7 LVH (LEFT VENTRICULAR HYPERTROPHY): ICD-10-CM

## 2025-05-16 PROCEDURE — 1160F RVW MEDS BY RX/DR IN RCRD: CPT | Performed by: INTERNAL MEDICINE

## 2025-05-16 PROCEDURE — 1036F TOBACCO NON-USER: CPT | Performed by: INTERNAL MEDICINE

## 2025-05-16 PROCEDURE — G8427 DOCREV CUR MEDS BY ELIG CLIN: HCPCS | Performed by: INTERNAL MEDICINE

## 2025-05-16 PROCEDURE — 1123F ACP DISCUSS/DSCN MKR DOCD: CPT | Performed by: INTERNAL MEDICINE

## 2025-05-16 PROCEDURE — 3023F SPIROM DOC REV: CPT | Performed by: INTERNAL MEDICINE

## 2025-05-16 PROCEDURE — 3078F DIAST BP <80 MM HG: CPT | Performed by: INTERNAL MEDICINE

## 2025-05-16 PROCEDURE — 3017F COLORECTAL CA SCREEN DOC REV: CPT | Performed by: INTERNAL MEDICINE

## 2025-05-16 PROCEDURE — 93000 ELECTROCARDIOGRAM COMPLETE: CPT | Performed by: INTERNAL MEDICINE

## 2025-05-16 PROCEDURE — 1159F MED LIST DOCD IN RCRD: CPT | Performed by: INTERNAL MEDICINE

## 2025-05-16 PROCEDURE — 99214 OFFICE O/P EST MOD 30 MIN: CPT | Performed by: INTERNAL MEDICINE

## 2025-05-16 PROCEDURE — 3074F SYST BP LT 130 MM HG: CPT | Performed by: INTERNAL MEDICINE

## 2025-05-16 PROCEDURE — G8417 CALC BMI ABV UP PARAM F/U: HCPCS | Performed by: INTERNAL MEDICINE

## 2025-05-16 RX ORDER — METOPROLOL SUCCINATE 25 MG/1
25 TABLET, EXTENDED RELEASE ORAL DAILY
Qty: 90 TABLET | Refills: 3
Start: 2025-05-16

## 2025-05-22 ENCOUNTER — CLINICAL SUPPORT (OUTPATIENT)
Dept: PULMONOLOGY | Age: 75
End: 2025-05-22

## 2025-05-22 DIAGNOSIS — R06.00 DYSPNEA, UNSPECIFIED TYPE: Primary | ICD-10-CM

## 2025-05-22 LAB
FEF25-27, POC: 0.58 L/S
FET, POC: NORMAL
FEV 1 , POC: 1.29 L
FEV1/FVC, POC: NORMAL
FVC, POC: NORMAL
LUNG AGE, POC: NORMAL
PEF, POC: 3.93 L/S

## 2025-06-26 NOTE — PROGRESS NOTES
Dr. Maggi Barros MD  [address]  [place] [address]  [phone]    Patient Name:  Tramaine Harding  YOB: 1950  PCP:  Dona Washburn NP  Office Visit: 6/27/2025    ASSESSMENT AND PLAN:  (Medical Decision Making)  1. Dyspnea, unspecified type  MMRC 2.  Severe with air trapping and continued obstruction.  Has peripheral eosinophilia sufficient to consider dupixent for better COPD management.  Discussed endobronchial valve therapy but his PFT don't meet the RV requirement of 150%.      2. Stage 2 moderate COPD by GOLD classification (HCC)  Transition Breztri to Brovana/Pulmicort twice daily and Yupelri every morning. If this provides better relief for dyspnea than breztri he can continue all nebs.  Otherwise, ok to switch back to breztri.    Completed pulmonary rehab.  Recommend albuterol neb use at least once during the midday  Initiate dupixent for COPD with peripheral eosinophilia.    3. Nocturnal hypoxemia  On 2lpm with sleep.     4.  Alpha-1 antitrypsin deficiency  Continue Prolastin    Total time for encounter on day of encounter was 40 minutes.  This time includes chart prep, review of tests/procedures, review of other provider's notes, documentation and counseling patient regarding disease process and medications.     ___________________________________________  The patient (or guardian, if applicable) and other individuals in attendance with the patient were advised that Artificial Intelligence will be utilized during this visit to record, process the conversation to generate a clinical note, and support improvement of the AI technology. The patient (or guardian, if applicable) and other individuals in attendance at the appointment consented to the use of AI, including the recording.        Reason for Visit:  COPD      History of Present Illness:     Tramaine Harding Is a 75 y.o. male with a past medical history of emphysema, lower lobe bronchiectasis, significant smoking

## 2025-06-27 ENCOUNTER — OFFICE VISIT (OUTPATIENT)
Dept: PULMONOLOGY | Age: 75
End: 2025-06-27
Payer: OTHER GOVERNMENT

## 2025-06-27 VITALS
OXYGEN SATURATION: 95 % | HEIGHT: 66 IN | BODY MASS INDEX: 30.7 KG/M2 | DIASTOLIC BLOOD PRESSURE: 67 MMHG | HEART RATE: 79 BPM | RESPIRATION RATE: 17 BRPM | SYSTOLIC BLOOD PRESSURE: 155 MMHG | WEIGHT: 191 LBS

## 2025-06-27 DIAGNOSIS — E88.01 ALPHA-1-ANTITRYPSIN DEFICIENCY (HCC): ICD-10-CM

## 2025-06-27 DIAGNOSIS — G47.34 NOCTURNAL HYPOXEMIA: ICD-10-CM

## 2025-06-27 DIAGNOSIS — J44.9 STAGE 3 SEVERE COPD BY GOLD CLASSIFICATION (HCC): ICD-10-CM

## 2025-06-27 DIAGNOSIS — R06.09 DYSPNEA ON EXERTION: Primary | ICD-10-CM

## 2025-06-27 PROCEDURE — 99214 OFFICE O/P EST MOD 30 MIN: CPT | Performed by: INTERNAL MEDICINE

## 2025-06-27 PROCEDURE — 3078F DIAST BP <80 MM HG: CPT | Performed by: INTERNAL MEDICINE

## 2025-06-27 PROCEDURE — 1123F ACP DISCUSS/DSCN MKR DOCD: CPT | Performed by: INTERNAL MEDICINE

## 2025-06-27 PROCEDURE — 3077F SYST BP >= 140 MM HG: CPT | Performed by: INTERNAL MEDICINE

## 2025-06-27 NOTE — PATIENT INSTRUCTIONS
We discussed the zephyr endobronchial valve but on further review it doesn't look like you meet criteria for it yet.

## 2025-07-03 ENCOUNTER — TELEPHONE (OUTPATIENT)
Dept: PULMONOLOGY | Age: 75
End: 2025-07-03

## 2025-07-03 NOTE — TELEPHONE ENCOUNTER
Ledy with [facility] called [phone] ext [phone] stating they received the Appeal Letter but they cannot read it. Will need to refax to [phone]. Appeal has been refaxed as requested.

## 2025-07-28 ENCOUNTER — TELEPHONE (OUTPATIENT)
Dept: INTERNAL MEDICINE CLINIC | Facility: CLINIC | Age: 75
End: 2025-07-28

## 2025-08-26 ENCOUNTER — OFFICE VISIT (OUTPATIENT)
Age: 75
End: 2025-08-26
Payer: MEDICARE

## 2025-08-26 ENCOUNTER — TELEPHONE (OUTPATIENT)
Dept: PULMONOLOGY | Age: 75
End: 2025-08-26

## 2025-08-26 VITALS
HEART RATE: 81 BPM | BODY MASS INDEX: 30.37 KG/M2 | WEIGHT: 189 LBS | DIASTOLIC BLOOD PRESSURE: 71 MMHG | HEIGHT: 66 IN | SYSTOLIC BLOOD PRESSURE: 138 MMHG

## 2025-08-26 DIAGNOSIS — I25.10 CORONARY ARTERY DISEASE INVOLVING NATIVE CORONARY ARTERY OF NATIVE HEART WITHOUT ANGINA PECTORIS: Primary | ICD-10-CM

## 2025-08-26 DIAGNOSIS — E78.2 MIXED HYPERLIPIDEMIA: Chronic | ICD-10-CM

## 2025-08-26 DIAGNOSIS — I34.0 NONRHEUMATIC MITRAL VALVE REGURGITATION: ICD-10-CM

## 2025-08-26 DIAGNOSIS — I10 ESSENTIAL HYPERTENSION: Chronic | ICD-10-CM

## 2025-08-26 DIAGNOSIS — I07.1 TRICUSPID VALVE INSUFFICIENCY, UNSPECIFIED ETIOLOGY: ICD-10-CM

## 2025-08-26 PROCEDURE — 99214 OFFICE O/P EST MOD 30 MIN: CPT | Performed by: INTERNAL MEDICINE

## 2025-08-26 PROCEDURE — G8417 CALC BMI ABV UP PARAM F/U: HCPCS | Performed by: INTERNAL MEDICINE

## 2025-08-26 PROCEDURE — 1123F ACP DISCUSS/DSCN MKR DOCD: CPT | Performed by: INTERNAL MEDICINE

## 2025-08-26 PROCEDURE — 3017F COLORECTAL CA SCREEN DOC REV: CPT | Performed by: INTERNAL MEDICINE

## 2025-08-26 PROCEDURE — G8427 DOCREV CUR MEDS BY ELIG CLIN: HCPCS | Performed by: INTERNAL MEDICINE

## 2025-08-26 PROCEDURE — 3078F DIAST BP <80 MM HG: CPT | Performed by: INTERNAL MEDICINE

## 2025-08-26 PROCEDURE — 1159F MED LIST DOCD IN RCRD: CPT | Performed by: INTERNAL MEDICINE

## 2025-08-26 PROCEDURE — 3075F SYST BP GE 130 - 139MM HG: CPT | Performed by: INTERNAL MEDICINE

## 2025-08-26 PROCEDURE — 1036F TOBACCO NON-USER: CPT | Performed by: INTERNAL MEDICINE

## 2025-08-26 PROCEDURE — 1126F AMNT PAIN NOTED NONE PRSNT: CPT | Performed by: INTERNAL MEDICINE

## 2025-08-26 RX ORDER — LOSARTAN POTASSIUM 100 MG/1
100 TABLET ORAL DAILY
Qty: 90 TABLET | Refills: 3 | Status: SHIPPED | OUTPATIENT
Start: 2025-08-26

## 2025-08-26 RX ORDER — ROSUVASTATIN CALCIUM 40 MG/1
40 TABLET, COATED ORAL DAILY
Qty: 90 TABLET | Refills: 3 | Status: SHIPPED | OUTPATIENT
Start: 2025-08-26

## 2025-08-26 RX ORDER — NITROGLYCERIN 0.3 MG/1
0.3 TABLET SUBLINGUAL EVERY 5 MIN PRN
Qty: 25 TABLET | Refills: 1 | Status: SHIPPED | OUTPATIENT
Start: 2025-08-26

## 2025-08-26 RX ORDER — METOPROLOL SUCCINATE 25 MG/1
25 TABLET, EXTENDED RELEASE ORAL DAILY
Qty: 90 TABLET | Refills: 3 | Status: SHIPPED | OUTPATIENT
Start: 2025-08-26

## 2025-08-26 ASSESSMENT — ENCOUNTER SYMPTOMS: CHEST TIGHTNESS: 0

## (undated) DEVICE — DRAPE,HIP,W/POUCHES,STERILE: Brand: MEDLINE

## (undated) DEVICE — INTRODUCER SHTH 7FR CANN L11CM 0.038IN STD ORNG W/ MINI

## (undated) DEVICE — CATHETER DIAG 6FR L100CM LUMN ID0.056IN JR4 CRV 0 SIDE H

## (undated) DEVICE — FLOSEAL MATRIX IS INDICATED IN SURGICAL PROCEDURES (OTHER THAN IN OPHTHALMIC) AS AN ADJUNCT TO HEMOSTASIS WHEN CONTROL OF BLEEDING BY LIGATURE OR CONVENTIONALPROCEDURES IS INEFFECTIVE OR IMPRACTICAL.: Brand: FLOSEAL HEMOSTATIC MATRIX

## (undated) DEVICE — MCLASS OSCILLATING SAW BLADE 19 X 1.27 (0.050") X 90 MM: Brand: MCLASS

## (undated) DEVICE — WAX SURG 2.5GM HEMSTAT BNE BEESWAX PARAFFIN ISO PALMITATE

## (undated) DEVICE — SUTURE ETHBND EXCEL SZ 5 L30IN NONABSORBABLE GRN L40MM V-37 MB66G

## (undated) DEVICE — SYR LR LCK 1ML GRAD NSAF 30ML --

## (undated) DEVICE — BIPOLAR SEALER 23-112-1 AQM 6.0: Brand: AQUAMANTYS ®

## (undated) DEVICE — SUTURE VCRL SZ 2-0 L27IN ABSRB UD L36MM CP-1 1/2 CIR REV J266H

## (undated) DEVICE — REM POLYHESIVE ADULT PATIENT RETURN ELECTRODE: Brand: VALLEYLAB

## (undated) DEVICE — 18G NG KIT WITH 96IN PROBE COVER (10 PK): Brand: SITE-RITE

## (undated) DEVICE — 1010 S-DRAPE TOWEL DRAPE 10/BX: Brand: STERI-DRAPE™

## (undated) DEVICE — STOCKINETTE TUBE 6X48 -- MEDICHOICE

## (undated) DEVICE — TRAY PREP DRY W/ PREM GLV 2 APPL 6 SPNG 2 UNDPD 1 OVERWRAP

## (undated) DEVICE — SUT VCRL + 3-0 27IN X1 VIO --

## (undated) DEVICE — Z DISCONTINUED USE 2744636  DRESSING AQUACEL 14 IN ALG W3.5XL14IN POLYUR FLM CVR W/ HYDRCOLL

## (undated) DEVICE — X-RAY SPONGES,12 PLY: Brand: DERMACEA

## (undated) DEVICE — STAPLER SKIN SQ 30 ABSRB STPL DISP INSORB

## (undated) DEVICE — FAN SPRAY KIT: Brand: PULSAVAC®

## (undated) DEVICE — SOLUTION IV 1000ML 0.9% SOD CHL

## (undated) DEVICE — 2000CC GUARDIAN II: Brand: GUARDIAN

## (undated) DEVICE — BUTTON SWITCH PENCIL BLADE ELECTRODE, HOLSTER: Brand: EDGE

## (undated) DEVICE — SUT VCRL + 2-0 27IN CP1 VIO --

## (undated) DEVICE — SUTURE VCRL + 3-0 L27IN ABSRB UD PS-2 L19MM 3/8 CIR PRIM VCP427H

## (undated) DEVICE — CATHETER DIAG 6FR L110CM PIGTAILS CRV STYL PIG145 DXTERITY

## (undated) DEVICE — DRAPE SHT 3 QTR PROXIMA 53X77 --

## (undated) DEVICE — MASTISOL ADHESIVE LIQ 2/3ML

## (undated) DEVICE — INTRODUCER SHTH 6FR L11CM 0.038IN STD SIDEPRT EXTN 3 W

## (undated) DEVICE — SYR 10ML LUER LOK 1/5ML GRAD --

## (undated) DEVICE — DRAPE XR C ARM 41X74IN LF --

## (undated) DEVICE — INTENDED FOR TISSUE SEPARATION, AND OTHER PROCEDURES THAT REQUIRE A SHARP SURGICAL BLADE TO PUNCTURE OR CUT.: Brand: BARD-PARKER SAFETY BLADES SIZE 10, STERILE

## (undated) DEVICE — 3M™ STERI-DRAPE™ INSTRUMENT POUCH 1018: Brand: STERI-DRAPE™

## (undated) DEVICE — SUTURE PDS II SZ 1 L54IN ABSRB VLT L65MM TP-1 1/2 CIR Z879G

## (undated) DEVICE — MEDI-VAC YANKAUER SUCTION HANDLE W/BULBOUS TIP: Brand: CARDINAL HEALTH

## (undated) DEVICE — 5.0MM PRECISION ROUND

## (undated) DEVICE — (D)PREP SKN CHLRAPRP APPL 26ML -- CONVERT TO ITEM 371833

## (undated) DEVICE — TRAY CATH 16F URIN MTR LTX -- CONVERT TO ITEM 363111

## (undated) DEVICE — KENDALL SCD EXPRESS SLEEVES, KNEE LENGTH, MEDIUM: Brand: KENDALL SCD

## (undated) DEVICE — SYR 50ML LR LCK 1ML GRAD NSAF --

## (undated) DEVICE — KIT POS W/ FOAM ARM CRADL SHEARGUARD CHST PD CVR FOR SPNL

## (undated) DEVICE — BLADE ASSEMB CLP HAIR FINE --

## (undated) DEVICE — PACK PROCEDURE SURG POST LAMINECTOMY CDS

## (undated) DEVICE — DRAPE TWL SURG 16X26IN BLU ORB04] ALLCARE INC]

## (undated) DEVICE — Device

## (undated) DEVICE — T4 HOOD

## (undated) DEVICE — TRAY CATH 16F DRN BG LTX -- CONVERT TO ITEM 363158

## (undated) DEVICE — SYSTEM CLOSURE 6-12 FR VEN VASC VASCADE MVP

## (undated) DEVICE — SOLUTION IV DEXTROSE/SALINE 5%-0.9% 500ML - 500ML

## (undated) DEVICE — 3M™ STERI-STRIP™ REINFORCED ADHESIVE SKIN CLOSURES, R1548, 1 IN X 5 IN (25 MM X 125 MM), 4 STRIPS/ENVELOPE: Brand: 3M™ STERI-STRIP™

## (undated) DEVICE — 3000CC GUARDIAN II: Brand: GUARDIAN

## (undated) DEVICE — SKIN STAPLER: Brand: SIGNET

## (undated) DEVICE — X-LARGE COTTON GLOVE: Brand: DEROYAL

## (undated) DEVICE — CATHETER THRMDIL 7FR L110CM STD PULM ART 4 INFUS LUMN SWN

## (undated) DEVICE — GUIDEWIRE VASC L150CM DIA0.035IN FLX END L7CM J 3MM PTFE

## (undated) DEVICE — 3M™ STERI-DRAPE™ INCISE DRAPE, XL 1051: Brand: STERI-DRAPE™

## (undated) DEVICE — SURGICAL PROCEDURE PACK TOT HIP CDS

## (undated) DEVICE — CATHETER DIAG 6FR L100CM LUMN ID0.056IN JL4 CRV 0 SIDE H

## (undated) DEVICE — INTENDED FOR TISSUE SEPARATION, AND OTHER PROCEDURES THAT REQUIRE A SHARP SURGICAL BLADE TO PUNCTURE OR CUT.: Brand: BARD-PARKER SAFETY BLADES SIZE 15, STERILE

## (undated) DEVICE — SOLUTION IRRIG 3000ML 0.9% SOD CHL FLX CONT 0797208] ICU MEDICAL INC]

## (undated) DEVICE — SURGIFOAM SPNG SZ 100

## (undated) DEVICE — AMD ANTIMICROBIAL GAUZE SPONGES,12 PLY USP TYPE VII, 0.2% POLYHEXAMETHYLENE BIGUANIDE HCI (PHMB): Brand: CURITY

## (undated) DEVICE — SUTURE VCRL SZ 1 L27IN ABSRB UD L36MM CP-1 1/2 CIR REV CUT J268H